# Patient Record
Sex: FEMALE | Race: ASIAN | NOT HISPANIC OR LATINO | URBAN - METROPOLITAN AREA
[De-identification: names, ages, dates, MRNs, and addresses within clinical notes are randomized per-mention and may not be internally consistent; named-entity substitution may affect disease eponyms.]

---

## 2019-07-09 NOTE — H&P ADULT - NSHPLABSRESULTS_GEN_ALL_CORE
Stress test (6/11/19): EF 67%, apex and inferior wall ischemia   Echo (4/26/19): EF 55-60%, mild MR and TR   EKG (4/26/19); SB at 56 bpm

## 2019-07-09 NOTE — H&P ADULT - HISTORY OF PRESENT ILLNESS
87 y.o female with PMHx of HTN, HLD, T2DM, CAD, s/p LAD/ D1 stent in 3/2015 who c/o CLARK and intermittent chest pain, radiating to back found to have apical and inferior wall ischemia. Pt was referred for cardiac cath with possible PCI.

## 2019-07-09 NOTE — H&P ADULT - ASSESSMENT
87 y.o female with PMHx of HTN, HLD, T2DM, CAD, s/p LAD/ D1 stent in 3/2015 who c/o CLARK and intermittent chest pain, radiating to back found to have apical and inferior wall ischemia. Pt was referred for cardiac cath with possible PCI.     # CAD  - plan for cardiac cath with possible PCI   - risks and benefits of procedure reviewed, all questions answered   - informed consent obtained, pt verbalized understanding. 87 y.o female with PMHx of HTN, HLD, T2DM, CAD, s/p LAD/ D1 stent in 3/2015 who c/o CLARK and intermittent chest pain, radiating to back found to have apical and inferior wall ischemia. Pt was referred for cardiac cath with possible PCI.   Calculated pt's bleeding risk score is 1.7%.

## 2019-07-09 NOTE — H&P ADULT - NSHPPHYSICALEXAM_GEN_ALL_CORE
Vital Signs : BP  130/60      HR  57     RR 16                Constitutional: well developed, well nourished, no deformities and no acute distress    Neurological: Alert & Oriented x 3, COE, no focal deficits    HEENT: NC/AT, PERRLA, EOMI,  Neck supple.    Respiratory: CTA B/L, No wheezing/crackles/rhonchi    Cardiovascular: (+) S1 & S2, RRR, No m/r/g    Gastrointestinal: soft, NT, nondistended, (+) BS    Genitourinary: non distended bladder, voiding freely    Extremities: No pedal edema, No clubbing, No cyanosis    Skin:  normal skin color and pigmentation, no skin lesions

## 2019-07-09 NOTE — H&P ADULT - NSHPREVIEWOFSYSTEMS_GEN_ALL_CORE
General: Pt denies recent weight loss/fever/chills    Neurological: denies numbness or  sensation loss    Cardiovascular: denies palpitations/leg edema (+) chest pain on exertion    Respiratory and Thorax: denies cough/wheezing (+)CLARK    Gastrointestinal: denies abdominal pain/diarrhea/constipation/bloody stool    Genitourinary: denies urinary frequency/urgency/ dysuria    Musculoskeletal: denies joint pain or swelling, denies restricted motion    Hematologic: denies abnormal bleeding

## 2019-07-09 NOTE — ASU PATIENT PROFILE, ADULT - PMH
CAD (coronary artery disease)  2 cardiac stents placed  Diabetes mellitus    Hyperlipidemia    Hypertension

## 2019-07-09 NOTE — H&P ADULT - PROBLEM SELECTOR PLAN 1
Pt is referred for Lt heart cath/possible PCI. Labs & medications are reviewed. Informed consent obtained after discussion of [facility] risks, benefits and alternatives  with patient. Risk discussed included, but not limited to MI, stroke, mortality, major bleeding, arrhythmia, or infection.  An educational material provided. Pt. verbalizes understandings of pre-procedural instructions.

## 2019-07-10 ENCOUNTER — OUTPATIENT (OUTPATIENT)
Dept: OUTPATIENT SERVICES | Facility: HOSPITAL | Age: 84
LOS: 1 days | Discharge: HOME | End: 2019-07-10
Payer: MEDICARE

## 2019-07-10 VITALS
HEART RATE: 53 BPM | OXYGEN SATURATION: 98 % | DIASTOLIC BLOOD PRESSURE: 73 MMHG | TEMPERATURE: 97 F | RESPIRATION RATE: 16 BRPM | WEIGHT: 152.12 LBS | HEIGHT: 60 IN | SYSTOLIC BLOOD PRESSURE: 135 MMHG

## 2019-07-10 VITALS
OXYGEN SATURATION: 98 % | HEART RATE: 60 BPM | RESPIRATION RATE: 16 BRPM | SYSTOLIC BLOOD PRESSURE: 140 MMHG | DIASTOLIC BLOOD PRESSURE: 60 MMHG

## 2019-07-10 DIAGNOSIS — H26.9 UNSPECIFIED CATARACT: Chronic | ICD-10-CM

## 2019-07-10 DIAGNOSIS — I25.10 ATHEROSCLEROTIC HEART DISEASE OF NATIVE CORONARY ARTERY WITHOUT ANGINA PECTORIS: ICD-10-CM

## 2019-07-10 LAB
ANION GAP SERPL CALC-SCNC: 6 MMOL/L — SIGNIFICANT CHANGE UP (ref 5–17)
BUN SERPL-MCNC: 16 MG/DL — SIGNIFICANT CHANGE UP (ref 7–23)
CALCIUM SERPL-MCNC: 9 MG/DL — SIGNIFICANT CHANGE UP (ref 8.5–10.1)
CHLORIDE SERPL-SCNC: 106 MMOL/L — SIGNIFICANT CHANGE UP (ref 96–108)
CO2 SERPL-SCNC: 29 MMOL/L — SIGNIFICANT CHANGE UP (ref 22–31)
CREAT SERPL-MCNC: 0.93 MG/DL — SIGNIFICANT CHANGE UP (ref 0.5–1.3)
GLUCOSE SERPL-MCNC: 108 MG/DL — HIGH (ref 70–99)
HCT VFR BLD CALC: 39.2 % — SIGNIFICANT CHANGE UP (ref 34.5–45)
HGB BLD-MCNC: 12.4 G/DL — SIGNIFICANT CHANGE UP (ref 11.5–15.5)
MCHC RBC-ENTMCNC: 30.2 PG — SIGNIFICANT CHANGE UP (ref 27–34)
MCHC RBC-ENTMCNC: 31.6 GM/DL — LOW (ref 32–36)
MCV RBC AUTO: 95.4 FL — SIGNIFICANT CHANGE UP (ref 80–100)
PLATELET # BLD AUTO: 236 K/UL — SIGNIFICANT CHANGE UP (ref 150–400)
POTASSIUM SERPL-MCNC: 4.2 MMOL/L — SIGNIFICANT CHANGE UP (ref 3.5–5.3)
POTASSIUM SERPL-SCNC: 4.2 MMOL/L — SIGNIFICANT CHANGE UP (ref 3.5–5.3)
RBC # BLD: 4.11 M/UL — SIGNIFICANT CHANGE UP (ref 3.8–5.2)
RBC # FLD: 12.6 % — SIGNIFICANT CHANGE UP (ref 10.3–14.5)
SODIUM SERPL-SCNC: 141 MMOL/L — SIGNIFICANT CHANGE UP (ref 135–145)
WBC # BLD: 5.09 K/UL — SIGNIFICANT CHANGE UP (ref 3.8–10.5)
WBC # FLD AUTO: 5.09 K/UL — SIGNIFICANT CHANGE UP (ref 3.8–10.5)

## 2019-07-10 PROCEDURE — 93010 ELECTROCARDIOGRAM REPORT: CPT

## 2019-07-10 NOTE — PACU DISCHARGE NOTE - COMMENTS
pt ghada for discharge home pts family verbalizes understand regarding discahrge instructions no questions at this time site intact iv removed pt escorted via transport w wheelchair and daughter.

## 2019-07-10 NOTE — PACU DISCHARGE NOTE - NS MD DISCHARGE NOTE DISCHARGE
Home Name and  verified. Patient stated PCP is Dr. Ivette Phelan [facility])  

## 2019-07-10 NOTE — PROGRESS NOTE ADULT - SUBJECTIVE AND OBJECTIVE BOX
Cath Lab Nurse Practitioner Note  HPI:  87 y.o female with PMHx of HTN, HLD, T2DM, CAD, s/p LAD/ D1 stent in 3/2015 who c/o CLARK and intermittent chest pain, radiating to back found to have apical and inferior wall ischemia. Pt was referred for cardiac cath with possible PCI.    s/p LHC : Non obstructive CAD  Pt denies chest pain/SOB/palpitations post cath.    Physical exam:  Vital Signs Last 24 Hrs  T(F): 97   HR: 53   BP: 135/73   RR: 16   SpO2: 98%   Cardiac : (+)S1S2, RRR  Lungs: CTA B/L  Abd: soft, NT, (+)BS  Ext: Rt groin (+) Mynx closure device , 2+ Rt DP    Labs:                        12.4   5.09  )-----------( 236      ( 10 Jul 2019 08:35 )             39.2     07-10    141  |  106  |  16  ----------------------------<  108<H>  4.2   |  29  |  0.93    Ca    9.0      10 Jul 2019 08:35    A/P : Non obstructive CAD  -encourage po hydration  -medical Mx for CAD  -hold metformin for 48 hrs post cath  -d/c home today  -f/u with Dr. Salgado in 1-2 weeks  -Plan discussed with pt/Dr. Man.

## 2019-07-17 DIAGNOSIS — Z79.84 LONG TERM (CURRENT) USE OF ORAL HYPOGLYCEMIC DRUGS: ICD-10-CM

## 2019-07-17 DIAGNOSIS — Z79.82 LONG TERM (CURRENT) USE OF ASPIRIN: ICD-10-CM

## 2019-07-17 DIAGNOSIS — R07.89 OTHER CHEST PAIN: ICD-10-CM

## 2019-07-17 DIAGNOSIS — Z79.02 LONG TERM (CURRENT) USE OF ANTITHROMBOTICS/ANTIPLATELETS: ICD-10-CM

## 2019-07-17 DIAGNOSIS — R94.39 ABNORMAL RESULT OF OTHER CARDIOVASCULAR FUNCTION STUDY: ICD-10-CM

## 2019-07-17 DIAGNOSIS — Z95.5 PRESENCE OF CORONARY ANGIOPLASTY IMPLANT AND GRAFT: ICD-10-CM

## 2019-07-17 DIAGNOSIS — I10 ESSENTIAL (PRIMARY) HYPERTENSION: ICD-10-CM

## 2019-07-17 DIAGNOSIS — E66.9 OBESITY, UNSPECIFIED: ICD-10-CM

## 2019-07-17 DIAGNOSIS — E11.9 TYPE 2 DIABETES MELLITUS WITHOUT COMPLICATIONS: ICD-10-CM

## 2019-07-17 DIAGNOSIS — I25.118 ATHEROSCLEROTIC HEART DISEASE OF NATIVE CORONARY ARTERY WITH OTHER FORMS OF ANGINA PECTORIS: ICD-10-CM

## 2019-07-17 DIAGNOSIS — I20.8 OTHER FORMS OF ANGINA PECTORIS: ICD-10-CM

## 2019-07-17 DIAGNOSIS — E78.00 PURE HYPERCHOLESTEROLEMIA, UNSPECIFIED: ICD-10-CM

## 2021-05-24 ENCOUNTER — INPATIENT (INPATIENT)
Facility: HOSPITAL | Age: 86
LOS: 7 days | Discharge: HOME | DRG: 872 | End: 2021-06-01
Attending: STUDENT IN AN ORGANIZED HEALTH CARE EDUCATION/TRAINING PROGRAM | Admitting: STUDENT IN AN ORGANIZED HEALTH CARE EDUCATION/TRAINING PROGRAM
Payer: MEDICARE

## 2021-05-24 VITALS
SYSTOLIC BLOOD PRESSURE: 66 MMHG | HEART RATE: 54 BPM | DIASTOLIC BLOOD PRESSURE: 47 MMHG | RESPIRATION RATE: 19 BRPM | OXYGEN SATURATION: 98 % | WEIGHT: 141.98 LBS | TEMPERATURE: 97 F | HEIGHT: 72 IN

## 2021-05-24 DIAGNOSIS — K83.09 OTHER CHOLANGITIS: ICD-10-CM

## 2021-05-24 DIAGNOSIS — I25.10 ATHEROSCLEROTIC HEART DISEASE OF NATIVE CORONARY ARTERY WITHOUT ANGINA PECTORIS: ICD-10-CM

## 2021-05-24 DIAGNOSIS — Z29.9 ENCOUNTER FOR PROPHYLACTIC MEASURES, UNSPECIFIED: ICD-10-CM

## 2021-05-24 DIAGNOSIS — K80.21 CALCULUS OF GALLBLADDER WITHOUT CHOLECYSTITIS WITH OBSTRUCTION: ICD-10-CM

## 2021-05-24 DIAGNOSIS — K21.9 GASTRO-ESOPHAGEAL REFLUX DISEASE WITHOUT ESOPHAGITIS: ICD-10-CM

## 2021-05-24 DIAGNOSIS — S42.402A UNSPECIFIED FRACTURE OF LOWER END OF LEFT HUMERUS, INITIAL ENCOUNTER FOR CLOSED FRACTURE: ICD-10-CM

## 2021-05-24 DIAGNOSIS — S22.39XA FRACTURE OF ONE RIB, UNSPECIFIED SIDE, INITIAL ENCOUNTER FOR CLOSED FRACTURE: ICD-10-CM

## 2021-05-24 DIAGNOSIS — A41.9 SEPSIS, UNSPECIFIED ORGANISM: ICD-10-CM

## 2021-05-24 DIAGNOSIS — I10 ESSENTIAL (PRIMARY) HYPERTENSION: ICD-10-CM

## 2021-05-24 DIAGNOSIS — E78.5 HYPERLIPIDEMIA, UNSPECIFIED: ICD-10-CM

## 2021-05-24 DIAGNOSIS — N17.9 ACUTE KIDNEY FAILURE, UNSPECIFIED: ICD-10-CM

## 2021-05-24 DIAGNOSIS — E11.9 TYPE 2 DIABETES MELLITUS WITHOUT COMPLICATIONS: ICD-10-CM

## 2021-05-24 DIAGNOSIS — H26.9 UNSPECIFIED CATARACT: Chronic | ICD-10-CM

## 2021-05-24 LAB
ALBUMIN SERPL ELPH-MCNC: 3.2 G/DL — LOW (ref 3.3–5)
ALP SERPL-CCNC: 180 U/L — HIGH (ref 40–120)
ALT FLD-CCNC: 97 U/L — HIGH (ref 12–78)
ANION GAP SERPL CALC-SCNC: 7 MMOL/L — SIGNIFICANT CHANGE UP (ref 5–17)
APTT BLD: 26.6 SEC — LOW (ref 27.5–35.5)
AST SERPL-CCNC: 107 U/L — HIGH (ref 15–37)
BASOPHILS # BLD AUTO: 0.02 K/UL — SIGNIFICANT CHANGE UP (ref 0–0.2)
BASOPHILS NFR BLD AUTO: 0.2 % — SIGNIFICANT CHANGE UP (ref 0–2)
BILIRUB SERPL-MCNC: 1.3 MG/DL — HIGH (ref 0.2–1.2)
BUN SERPL-MCNC: 36 MG/DL — HIGH (ref 7–23)
CALCIUM SERPL-MCNC: 8.5 MG/DL — SIGNIFICANT CHANGE UP (ref 8.5–10.1)
CHLORIDE SERPL-SCNC: 102 MMOL/L — SIGNIFICANT CHANGE UP (ref 96–108)
CO2 SERPL-SCNC: 27 MMOL/L — SIGNIFICANT CHANGE UP (ref 22–31)
CREAT SERPL-MCNC: 1.8 MG/DL — HIGH (ref 0.5–1.3)
EOSINOPHIL # BLD AUTO: 0.02 K/UL — SIGNIFICANT CHANGE UP (ref 0–0.5)
EOSINOPHIL NFR BLD AUTO: 0.2 % — SIGNIFICANT CHANGE UP (ref 0–6)
GLUCOSE SERPL-MCNC: 192 MG/DL — HIGH (ref 70–99)
HCT VFR BLD CALC: 35.3 % — SIGNIFICANT CHANGE UP (ref 34.5–45)
HGB BLD-MCNC: 11.6 G/DL — SIGNIFICANT CHANGE UP (ref 11.5–15.5)
IMM GRANULOCYTES NFR BLD AUTO: 1.7 % — HIGH (ref 0–1.5)
INR BLD: 1.17 RATIO — HIGH (ref 0.88–1.16)
LACTATE SERPL-SCNC: 1.1 MMOL/L — SIGNIFICANT CHANGE UP (ref 0.7–2)
LACTATE SERPL-SCNC: 2.5 MMOL/L — HIGH (ref 0.7–2)
LIDOCAIN IGE QN: 272 U/L — SIGNIFICANT CHANGE UP (ref 73–393)
LYMPHOCYTES # BLD AUTO: 0.68 K/UL — LOW (ref 1–3.3)
LYMPHOCYTES # BLD AUTO: 6.7 % — LOW (ref 13–44)
MCHC RBC-ENTMCNC: 30.9 PG — SIGNIFICANT CHANGE UP (ref 27–34)
MCHC RBC-ENTMCNC: 32.9 GM/DL — SIGNIFICANT CHANGE UP (ref 32–36)
MCV RBC AUTO: 94.1 FL — SIGNIFICANT CHANGE UP (ref 80–100)
MONOCYTES # BLD AUTO: 0.91 K/UL — HIGH (ref 0–0.9)
MONOCYTES NFR BLD AUTO: 9 % — SIGNIFICANT CHANGE UP (ref 2–14)
NEUTROPHILS # BLD AUTO: 8.3 K/UL — HIGH (ref 1.8–7.4)
NEUTROPHILS NFR BLD AUTO: 82.2 % — HIGH (ref 43–77)
NRBC # BLD: 0 /100 WBCS — SIGNIFICANT CHANGE UP (ref 0–0)
NT-PROBNP SERPL-SCNC: 1103 PG/ML — HIGH (ref 0–450)
PLATELET # BLD AUTO: 402 K/UL — HIGH (ref 150–400)
POTASSIUM SERPL-MCNC: 5 MMOL/L — SIGNIFICANT CHANGE UP (ref 3.5–5.3)
POTASSIUM SERPL-SCNC: 5 MMOL/L — SIGNIFICANT CHANGE UP (ref 3.5–5.3)
PROT SERPL-MCNC: 7 G/DL — SIGNIFICANT CHANGE UP (ref 6–8.3)
PROTHROM AB SERPL-ACNC: 13.6 SEC — SIGNIFICANT CHANGE UP (ref 10.6–13.6)
RBC # BLD: 3.75 M/UL — LOW (ref 3.8–5.2)
RBC # FLD: 12.4 % — SIGNIFICANT CHANGE UP (ref 10.3–14.5)
SARS-COV-2 RNA SPEC QL NAA+PROBE: SIGNIFICANT CHANGE UP
SODIUM SERPL-SCNC: 136 MMOL/L — SIGNIFICANT CHANGE UP (ref 135–145)
TROPONIN I SERPL-MCNC: 0.03 NG/ML — SIGNIFICANT CHANGE UP (ref 0.01–0.04)
WBC # BLD: 10.1 K/UL — SIGNIFICANT CHANGE UP (ref 3.8–10.5)
WBC # FLD AUTO: 10.1 K/UL — SIGNIFICANT CHANGE UP (ref 3.8–10.5)

## 2021-05-24 PROCEDURE — 93010 ELECTROCARDIOGRAM REPORT: CPT

## 2021-05-24 PROCEDURE — 71045 X-RAY EXAM CHEST 1 VIEW: CPT | Mod: 26

## 2021-05-24 PROCEDURE — 73090 X-RAY EXAM OF FOREARM: CPT | Mod: 26,LT

## 2021-05-24 PROCEDURE — 73200 CT UPPER EXTREMITY W/O DYE: CPT | Mod: 26,LT

## 2021-05-24 PROCEDURE — 99223 1ST HOSP IP/OBS HIGH 75: CPT | Mod: GC

## 2021-05-24 PROCEDURE — 73070 X-RAY EXAM OF ELBOW: CPT | Mod: 26,LT,59

## 2021-05-24 PROCEDURE — 73060 X-RAY EXAM OF HUMERUS: CPT | Mod: 26,LT

## 2021-05-24 PROCEDURE — 99285 EMERGENCY DEPT VISIT HI MDM: CPT | Mod: CS

## 2021-05-24 PROCEDURE — 73080 X-RAY EXAM OF ELBOW: CPT | Mod: 26,LT

## 2021-05-24 PROCEDURE — 74176 CT ABD & PELVIS W/O CONTRAST: CPT | Mod: 26,MA

## 2021-05-24 PROCEDURE — 71250 CT THORAX DX C-: CPT | Mod: 26,MA

## 2021-05-24 PROCEDURE — 76376 3D RENDER W/INTRP POSTPROCES: CPT | Mod: 26

## 2021-05-24 RX ORDER — SIMVASTATIN 20 MG/1
40 TABLET, FILM COATED ORAL AT BEDTIME
Refills: 0 | Status: DISCONTINUED | OUTPATIENT
Start: 2021-05-24 | End: 2021-05-24

## 2021-05-24 RX ORDER — SODIUM CHLORIDE 9 MG/ML
2000 INJECTION INTRAMUSCULAR; INTRAVENOUS; SUBCUTANEOUS ONCE
Refills: 0 | Status: COMPLETED | OUTPATIENT
Start: 2021-05-24 | End: 2021-05-24

## 2021-05-24 RX ORDER — DEXTROSE 50 % IN WATER 50 %
12.5 SYRINGE (ML) INTRAVENOUS ONCE
Refills: 0 | Status: DISCONTINUED | OUTPATIENT
Start: 2021-05-24 | End: 2021-05-30

## 2021-05-24 RX ORDER — SUCRALFATE 1 G
1 TABLET ORAL EVERY 12 HOURS
Refills: 0 | Status: DISCONTINUED | OUTPATIENT
Start: 2021-05-24 | End: 2021-06-01

## 2021-05-24 RX ORDER — PANTOPRAZOLE SODIUM 20 MG/1
40 TABLET, DELAYED RELEASE ORAL
Refills: 0 | Status: DISCONTINUED | OUTPATIENT
Start: 2021-05-24 | End: 2021-06-01

## 2021-05-24 RX ORDER — GLUCAGON INJECTION, SOLUTION 0.5 MG/.1ML
1 INJECTION, SOLUTION SUBCUTANEOUS ONCE
Refills: 0 | Status: DISCONTINUED | OUTPATIENT
Start: 2021-05-24 | End: 2021-05-30

## 2021-05-24 RX ORDER — POLYETHYLENE GLYCOL 3350 17 G/17G
17 POWDER, FOR SOLUTION ORAL DAILY
Refills: 0 | Status: DISCONTINUED | OUTPATIENT
Start: 2021-05-24 | End: 2021-06-01

## 2021-05-24 RX ORDER — DEXTROSE 50 % IN WATER 50 %
15 SYRINGE (ML) INTRAVENOUS ONCE
Refills: 0 | Status: DISCONTINUED | OUTPATIENT
Start: 2021-05-24 | End: 2021-05-30

## 2021-05-24 RX ORDER — LANOLIN ALCOHOL/MO/W.PET/CERES
5 CREAM (GRAM) TOPICAL AT BEDTIME
Refills: 0 | Status: DISCONTINUED | OUTPATIENT
Start: 2021-05-24 | End: 2021-06-01

## 2021-05-24 RX ORDER — DEXTROSE 50 % IN WATER 50 %
25 SYRINGE (ML) INTRAVENOUS ONCE
Refills: 0 | Status: DISCONTINUED | OUTPATIENT
Start: 2021-05-24 | End: 2021-05-30

## 2021-05-24 RX ORDER — ASPIRIN/CALCIUM CARB/MAGNESIUM 324 MG
81 TABLET ORAL DAILY
Refills: 0 | Status: DISCONTINUED | OUTPATIENT
Start: 2021-05-24 | End: 2021-06-01

## 2021-05-24 RX ORDER — OXYCODONE HYDROCHLORIDE 5 MG/1
10 TABLET ORAL EVERY 4 HOURS
Refills: 0 | Status: DISCONTINUED | OUTPATIENT
Start: 2021-05-24 | End: 2021-05-25

## 2021-05-24 RX ORDER — SODIUM CHLORIDE 9 MG/ML
1000 INJECTION, SOLUTION INTRAVENOUS
Refills: 0 | Status: DISCONTINUED | OUTPATIENT
Start: 2021-05-24 | End: 2021-05-30

## 2021-05-24 RX ORDER — SODIUM CHLORIDE 9 MG/ML
1000 INJECTION INTRAMUSCULAR; INTRAVENOUS; SUBCUTANEOUS
Refills: 0 | Status: DISCONTINUED | OUTPATIENT
Start: 2021-05-24 | End: 2021-05-25

## 2021-05-24 RX ORDER — OXYCODONE HYDROCHLORIDE 5 MG/1
5 TABLET ORAL EVERY 4 HOURS
Refills: 0 | Status: DISCONTINUED | OUTPATIENT
Start: 2021-05-24 | End: 2021-05-25

## 2021-05-24 RX ORDER — ACETAMINOPHEN 500 MG
650 TABLET ORAL EVERY 6 HOURS
Refills: 0 | Status: DISCONTINUED | OUTPATIENT
Start: 2021-05-24 | End: 2021-06-01

## 2021-05-24 RX ORDER — PIPERACILLIN AND TAZOBACTAM 4; .5 G/20ML; G/20ML
3.38 INJECTION, POWDER, LYOPHILIZED, FOR SOLUTION INTRAVENOUS ONCE
Refills: 0 | Status: COMPLETED | OUTPATIENT
Start: 2021-05-24 | End: 2021-05-24

## 2021-05-24 RX ORDER — SENNA PLUS 8.6 MG/1
2 TABLET ORAL AT BEDTIME
Refills: 0 | Status: DISCONTINUED | OUTPATIENT
Start: 2021-05-24 | End: 2021-06-01

## 2021-05-24 RX ORDER — INSULIN LISPRO 100/ML
VIAL (ML) SUBCUTANEOUS EVERY 6 HOURS
Refills: 0 | Status: DISCONTINUED | OUTPATIENT
Start: 2021-05-24 | End: 2021-05-26

## 2021-05-24 RX ORDER — ACETAMINOPHEN 500 MG
0 TABLET ORAL
Qty: 0 | Refills: 0 | DISCHARGE

## 2021-05-24 RX ORDER — HEPARIN SODIUM 5000 [USP'U]/ML
5000 INJECTION INTRAVENOUS; SUBCUTANEOUS EVERY 12 HOURS
Refills: 0 | Status: DISCONTINUED | OUTPATIENT
Start: 2021-05-24 | End: 2021-06-01

## 2021-05-24 RX ORDER — PIPERACILLIN AND TAZOBACTAM 4; .5 G/20ML; G/20ML
3.38 INJECTION, POWDER, LYOPHILIZED, FOR SOLUTION INTRAVENOUS EVERY 8 HOURS
Refills: 0 | Status: DISCONTINUED | OUTPATIENT
Start: 2021-05-25 | End: 2021-05-25

## 2021-05-24 RX ADMIN — SODIUM CHLORIDE 75 MILLILITER(S): 9 INJECTION INTRAMUSCULAR; INTRAVENOUS; SUBCUTANEOUS at 22:46

## 2021-05-24 RX ADMIN — PIPERACILLIN AND TAZOBACTAM 200 GRAM(S): 4; .5 INJECTION, POWDER, LYOPHILIZED, FOR SOLUTION INTRAVENOUS at 16:08

## 2021-05-24 RX ADMIN — Medication 1 ENEMA: at 22:46

## 2021-05-24 RX ADMIN — SENNA PLUS 2 TABLET(S): 8.6 TABLET ORAL at 22:46

## 2021-05-24 RX ADMIN — Medication 5 MILLIGRAM(S): at 22:46

## 2021-05-24 RX ADMIN — SODIUM CHLORIDE 2000 MILLILITER(S): 9 INJECTION INTRAMUSCULAR; INTRAVENOUS; SUBCUTANEOUS at 16:08

## 2021-05-24 NOTE — H&P ADULT - PROBLEM SELECTOR PLAN 2
s/p mechanical fall with X ray left elbow: Distal LEFT humerus T-shaped intra-articular, intercondylar fracture deformity.  - seen and splinted by orthopedics in the ED  - pain control: tyelenol for mild, oxy for moderate and severe  - fu CT Left Elbow, orthopedic recs appreciated   - PT consulted CT showed showed Cholelithiasis. Dilated common bile duct, with fever, vomiting and decreased appetite ; with transaminitis   - follow up MRI  - GI Hernandez consulted zuri maher CT showed showed Cholelithiasis. Dilated common bile duct, with fever, vomiting and decreased appetite ; with transaminitis   - start zosyn, IVF   - GI Hernandez consulted appreciate recs BP noted to be 66/47, Lactate 2.5 on admission, febrile 101 at home; given 2L fluids in ED  - likely secondary to cholangitis; CT showed Cholelithiasis. Dilated common bile duct.  - continue IV zosyn to cover intraabdominal luba  - maintenance IVF Ns @75 cc/hour  - NPO for now except medications  - follow up repeat lactate  - follow up blood/urine cultures  - tyelenol PRN for fever

## 2021-05-24 NOTE — H&P ADULT - PROBLEM SELECTOR PLAN 9
Hold Metformin   - continue low dose insulin sliding scale with accuchecks  - follow up AM hemoglobin A1C Hold Metformin   - continue low dose insulin sliding scale with accuchecks  - follow up AM hemoglobin A1C    12. Constipation  - stool burden noted on CT A/P   - patient c/o no BM 3 days   - will give fleet enema Hold Metformin   - continue low dose insulin sliding scale with accuchecks  - follow up AM hemoglobin A1C    12. Constipation likely 2/2 to immobilization and opiates   - stool burden noted on CT A/P   - patient c/o no BM 3 days   - will give fleet enema Hold Metformin   - continue low dose insulin sliding scale with accuchecks  - follow up AM hemoglobin A1C    12. Constipation likely 2/2 to immobilization and opiates   - stool burden noted on CT A/P   - patient c/o no BM 3 days   - will give fleet enema  - bowel regimen senna/miralax Hold Metformin   - continue low dose insulin sliding scale with accuchecks and hypoglycemia protocol  - follow up AM hemoglobin A1C

## 2021-05-24 NOTE — ED PROVIDER NOTE - CARE PLAN
Detail Level: Zone Principal Discharge DX:	Sepsis  Secondary Diagnosis:	Cholelithiasis with obstruction  Secondary Diagnosis:	Elbow fracture, left

## 2021-05-24 NOTE — H&P ADULT - HISTORY OF PRESENT ILLNESS
88 yo F PMHx T2DM, HTN, HLD, GERD, CAD (s/p LAD 2019 with stents placed on DAPT) brought in by Daughter for fever, weakness. Patient states that she fell a week ago and since then she has been having pain in her left arm and left chest. Patient also complaining of constipation, feeling as if she cannot have a bowel movement.     History taken by the daughter who lives with her and is her primary caretaker. Patient had a mechanical fall May 12th in the grocery store. As per the daughter, she was doing fine walking slowing around the store and acting like her normal self. Upon leaving the store, the patient seemed to trip and fall on her left side, did not strike her head. Daughter said she was in some pain at the time, and gave her Bengay cream for the left arm pain, no swelling noted at the time. The following day, she found that her mother had severe swelling from her shoulder to her hand, and bruising. Patient was also complaining of severe pain. Daughter brought her to an orthopedist, who she follows for chronic shoulder pain. X rays were done and daughter states that she was sent home. Patient was complaining of severe pain, and inability to  her arm and complaining of left sided rib pain, worse with inhalation. Daughter brought her to [facility] in [place] who prescribed her oxycodone and a five day course of prednisone for the pain. She completed the five day course and was taking oxycodone tablets once a day at bedtime everyday within adequate control of the pain. Yesterday, patient complaining of decreased appetite, appeared more lethargic than usual. Patient was tolerating small bites of crackers, toast. Last night, daughter took her blood pressure and it was 140/70. This morning, daughter checked her temperature which was 101. She gave her tyelenol X 1, which improved the fever. Patient had an appointment to the hand specialist referred to be orthopedist and upon arriving to the office, patient vomited and was not feeling well. Daughter then brought her to the ER instead.     Denies any changes in her mental status, shortness of breath, focal weakness, abdominal pain, diarrhea, blood in stool, new rashes, sick contacts, or positive COVID contacts or dysuria, or hematuria .  89 yo F PMHx T2DM, HTN, HLD, GERD, CAD (s/p LAD 2019 with stents placed on DAPT) brought in by Daughter for fever, weakness. Patient states that she fell a week ago and since then she has been having pain in her left arm and left chest. Patient also complaining of constipation, feeling as if she cannot have a bowel movement.     History taken by the daughter who lives with her and is her primary caretaker. Patient had a mechanical fall May 12th in the grocery store. As per the daughter, she was doing fine walking slowing around the store and acting like her normal self. Upon leaving the store, the patient seemed to trip and fall on her left side, did not strike her head. Daughter said she was in some pain at the time, and gave her Bengay cream for the left arm pain, no swelling noted at the time. The following day, she found that her mother had severe swelling from her shoulder to her hand, and bruising. Patient was also complaining of severe pain. Daughter brought her to an orthopedist, who she follows for chronic shoulder pain. X rays were done and daughter states that she was sent home. Patient was complaining of severe pain, and inability to  her arm and complaining of left sided rib pain, worse with inhalation. Daughter brought her to [facility] in [place] who prescribed her oxycodone and a five day course of prednisone for the pain. She completed the five day course and was taking oxycodone tablets once a day at bedtime everyday within adequate control of the pain. Yesterday, patient complaining of decreased appetite, appeared more lethargic than usual. Patient was tolerating small bites of crackers, toast. Last night, daughter took her blood pressure and it was 140/70. This morning, daughter checked her temperature which was 101. She gave her tyelenol X 1, which improved the fever. Patient had an appointment to the hand specialist referred to be orthopedist and upon arriving to the office, patient vomited and was not feeling well. Daughter then brought her to the ER instead.     Denies any changes in her mental status, shortness of breath, focal weakness, abdominal pain, diarrhea, blood in stool, new rashes, sick contacts, or positive COVID contacts or dysuria, or hematuria.     ED Course:    BP 66/47 HR 57 SpO2 98 RA T 97 F   given zosyn X 1, 2L NS bolus with improvement in BP   CT A/P and CT Chest: BONES: Chronic appearing left anterior rib fractures. Cholelithiasis. Dilated common bile duct.  X ray left elbow:   Distal LEFT humerus T-shaped intra-articular, intercondylar fracture deformity.  Labs: lactate 2.5, BUN/Cr 36/1.8, AST/ALT: 107/97, alkphos: 180, total bilirubin: 1.3     88 yo F PMHx T2DM, HTN, HLD, GERD, CAD (s/p LAD 2019 with stents placed on DAPT) brought in by Daughter for fever, weakness. Patient states that she fell a week ago and since then she has been having pain in her left arm and left chest. Patient also complaining of constipation, feeling as if she cannot have a bowel movement.     History taken by the daughter who lives with her and is her primary caretaker. Patient had a mechanical fall May 12th in the grocery store. As per the daughter, she was doing fine walking slowing around the store and acting like her normal self. Upon leaving the store, the patient seemed to trip and fall on her left side, did not strike her head. Daughter said she was in some pain at the time, and gave her Bengay cream for the left arm pain, no swelling noted at the time. The following day, she found that her mother had severe swelling from her shoulder to her hand, and bruising. Patient was also complaining of severe pain. Daughter brought her to an orthopedist, who she follows for chronic shoulder pain. X rays were done and daughter states that she was sent home. Patient was complaining of severe pain, and inability to  her arm and complaining of left sided rib pain, worse with inhalation. Daughter brought her to [facility] in [place] who prescribed her oxycodone and a five day course of prednisone for the pain. She completed the five day course and was taking oxycodone tablets once a day at bedtime everyday within adequate control of the pain. Yesterday, patient complaining of decreased appetite, appeared more lethargic than usual. Patient was tolerating small bites of crackers, toast. Last night, daughter took her blood pressure and it was 140/70. This morning, daughter checked her temperature which was 101. She gave her tyelenol X 1, which improved the fever. Patient had an appointment to the hand specialist referred to be orthopedist and upon arriving to the office, patient vomited and was not feeling well. Daughter then brought her to the ER instead.     Denies any changes in her mental status, shortness of breath, focal weakness, abdominal pain, diarrhea, blood in stool, new rashes, sick contacts, or positive COVID contacts or dysuria, or hematuria.     ED Course:    BP 66/47 HR 57 SpO2 98 RA T 97 F   given zosyn X 1, 2L NS bolus with improvement in BP   EKG: sinus bradycardia @ 53 bpm, no ST or T wave changes noted   CT A/P and CT Chest: BONES: Chronic appearing left anterior rib fractures. Cholelithiasis. Dilated common bile duct.  X ray left elbow:   Distal LEFT humerus T-shaped intra-articular, intercondylar fracture deformity.  Labs: lactate 2.5, BUN/Cr 36/1.8, AST/ALT: 107/97, alkphos: 180, total bilirubin: 1.3; troponin negative      88 yo F PMHx T2DM, HTN, HLD, GERD, CAD (s/p LAD 2019 with stents placed on DAPT) brought in by Daughter for fever, weakness. Patient states that she fell a week ago and since then she has been having pain in her left arm and left chest. Patient also complaining of constipation, feeling as if she cannot have a bowel movement.     History taken by the daughter who lives with her and is her primary caretaker. Patient had a mechanical fall May 12th in the grocery store.As per the daughter, patient was in the grocery store. Upon leaving the store, the patient seemed to trip and fall on her left side, did not strike her head. Daughter said she was in some pain at the time, and gave her Bengay cream for the left arm pain, no swelling noted at the time. The following day, she found that her mother had severe swelling from her shoulder to her hand, and bruising. Patient was also complaining of severe pain. Daughter brought her to an orthopedist, who she follows for chronic shoulder pain. X rays were done and daughter states that she was sent home. Patient was complaining of severe pain, and inability to  her arm and complaining of left sided rib pain, worse with inhalation. Daughter brought her to [facility] in [place] who prescribed her oxycodone and a five day course of prednisone for the pain. She completed the five day course and was taking oxycodone tablets once a day at bedtime everyday within adequate control of the pain. Yesterday, patient complaining of decreased appetite, appeared more lethargic than usual. Patient was tolerating small bites of crackers, toast. Last night, daughter took her blood pressure and it was 140/70. This morning, daughter checked her temperature which was 101. She gave her tyelenol X 1, which improved the fever. Patient had an appointment to the hand specialist referred to be orthopedist and upon arriving to the office, patient vomited and was not feeling well. Daughter then brought her to the ER instead.     Denies any changes in her mental status, shortness of breath, focal weakness, abdominal pain, diarrhea, blood in stool, new rashes, sick contacts, or positive COVID contacts or dysuria, or hematuria.     ED Course:    BP 66/47 HR 57 SpO2 98 RA T 97 F   given zosyn X 1, 2L NS bolus with improvement in BP   EKG: sinus bradycardia @ 53 bpm, no ST or T wave changes noted   CT A/P and CT Chest: BONES: Chronic appearing left anterior rib fractures. Cholelithiasis. Dilated common bile duct.  X ray left elbow:   Distal LEFT humerus T-shaped intra-articular, intercondylar fracture deformity.  Labs: lactate 2.5, BUN/Cr 36/1.8, AST/ALT: 107/97, alkphos: 180, total bilirubin: 1.3; troponin negative      88 yo F PMHx T2DM, HTN, HLD, GERD, CAD (s/p LAD 2019 with stents placed on DAPT) brought in by Daughter for fever, weakness. Patient states that she fell a week ago and since then she has been having pain in her left arm and left chest. Patient also complaining of constipation, feeling as if she cannot have a bowel movement.   *patient speaks benigno and luis miguel     History taken by the daughter who lives with her and is her primary caretaker. Patient had a mechanical fall May 12th in the grocery store.As per the daughter, patient was in the grocery store. Upon leaving the store, the patient seemed to trip and fall on her left side, did not strike her head. Daughter said she was in some pain at the time, and gave her Bengay cream for the left arm pain, no swelling noted at the time. The following day, she found that her mother had severe swelling from her shoulder to her hand, and bruising. Patient was also complaining of severe pain. Daughter brought her to an orthopedist, who she follows for chronic shoulder pain. X rays were done and daughter states that she was sent home. Patient was complaining of severe pain, and inability to  her arm and complaining of left sided rib pain, worse with inhalation. Daughter brought her to [facility] in [place] who prescribed her oxycodone and a five day course of prednisone for the pain. She completed the five day course and was taking oxycodone tablets once a day at bedtime everyday within adequate control of the pain. Yesterday, patient complaining of decreased appetite, appeared more lethargic than usual. Patient was tolerating small bites of crackers, toast. Last night, daughter took her blood pressure and it was 140/70. This morning, daughter checked her temperature which was 101. She gave her tyelenol X 1, which improved the fever. Patient had an appointment to the hand specialist referred to be orthopedist and upon arriving to the office, patient vomited and was not feeling well. Daughter then brought her to the ER instead.     Denies any changes in her mental status, shortness of breath, focal weakness, abdominal pain, diarrhea, blood in stool, new rashes, sick contacts, or positive COVID contacts or dysuria, or hematuria.     ED Course:    BP 66/47 HR 57 SpO2 98 RA T 97 F   given zosyn X 1, 2L NS bolus with improvement in BP   EKG: sinus bradycardia @ 53 bpm, no ST or T wave changes noted   CT A/P and CT Chest: BONES: Chronic appearing left anterior rib fractures. Cholelithiasis. Dilated common bile duct.  X ray left elbow:   Distal LEFT humerus T-shaped intra-articular, intercondylar fracture deformity.  Labs: lactate 2.5, BUN/Cr 36/1.8, AST/ALT: 107/97, alkphos: 180, total bilirubin: 1.3; troponin negative      88 yo F PMHx T2DM, HTN, HLD, GERD, CAD (s/p LAD 2019 with stents placed on DAPT) brought in by Daughter for fever, weakness. Patient states that she fell a week ago and since then she has been having pain in her left arm and left chest. Patient also complaining of constipation, feeling as if she cannot have a bowel movement.     History taken by the daughter who lives with her and is her primary caretaker. Patient had a mechanical fall May 12th in the grocery store.As per the daughter, patient was in the grocery store. Upon leaving the store, the patient seemed to trip and fall on her left side, did not strike her head. Daughter said she was in some pain at the time, and gave her Bengay cream for the left arm pain, no swelling noted at the time. The following day, she found that her mother had severe swelling from her shoulder to her hand, and bruising. Patient was also complaining of severe pain. Daughter brought her to an orthopedist, who she follows for chronic shoulder pain. X rays were done and daughter states that she was sent home. Patient was complaining of severe pain, and inability to  her arm and complaining of left sided rib pain, worse with inhalation. Daughter brought her to [facility] in [place] who prescribed her oxycodone and a five day course of prednisone for the pain. She completed the five day course and was taking oxycodone tablets once a day at bedtime everyday within adequate control of the pain. Yesterday, patient complaining of decreased appetite, appeared more lethargic than usual. Patient was tolerating small bites of crackers, toast. Last night, daughter took her blood pressure and it was 140/70. This morning, daughter checked her temperature which was 101. She gave her tyelenol X 1, which improved the fever. Patient had an appointment to the hand specialist referred to be orthopedist and upon arriving to the office, patient vomited and was not feeling well. Daughter then brought her to the ER instead.     Patient speaks Tita and Janelle. Denies any changes in her mental status, shortness of breath, focal weakness, abdominal pain, diarrhea, blood in stool, new rashes, sick contacts, or positive COVID contacts or dysuria, or hematuria.     ED Course:   BP 66/47 HR 57 SpO2 98 RA T 97 F   given zosyn X 1, 2L NS bolus with improvement in BP   EKG: sinus bradycardia @ 53 bpm, no ST or T wave changes noted   CT A/P and CT Chest: BONES: Chronic appearing left anterior rib fractures. Cholelithiasis. Dilated common bile duct.  X ray left elbow:  Distal LEFT humerus T-shaped intra-articular, intercondylar fracture deformity.  Labs: lactate 2.5, BUN/Cr 36/1.8, AST/ALT: 107/97, alkphos: 180, total bilirubin: 1.3; troponin negative

## 2021-05-24 NOTE — H&P ADULT - NSHPSOCIALHISTORY_GEN_ALL_CORE
Tobacco: denies  ETOH: denies  Rec Drug Use: denies  Lives at home with Daughter, Son in Law and Grandson  Ambulates with Cane  Requires assistance for ADLS

## 2021-05-24 NOTE — H&P ADULT - NSICDXFAMILYHX_GEN_ALL_CORE_FT
FAMILY HISTORY:  Child  Still living? No  FH: MI (myocardial infarction), Age at diagnosis: Age Unknown

## 2021-05-24 NOTE — H&P ADULT - PROBLEM SELECTOR PLAN 10
start heparin subQ 5000 BID for DVT prophylaxis  IMPROVE VTE Individual Risk Assessment          RISK                                                          Points  [  ] Previous VTE                                                3  [  ] Thrombophilia                                             2  [  ] Lower limb paralysis                                   2        (unable to hold up >15 seconds)    [  ] Current Cancer                                             2         (within 6 months)  [  ] Immobilization > 24 hrs                              1  [  ] ICU/CCU stay > 24 hours                             1  [ x ] Age > 60                                                         1    IMPROVE VTE Score:         [     1    ] BUN/Cr: 36/1.8 likely prerenal 2/2 to sepsis, volume depletion  - start IVF maintenance   - eGFR noted to be 25, likely component of chronic kidney disease stage 4; continue to monitor renal function, hold nephrotoxic agents, avoid NSAIDs   11. Need for prophylactic measures  - start heparin for DVT prophylaxis  IMPROVE VTE Individual Risk Assessment          RISK                                                          Points  [  ] Previous VTE                                                3  [  ] Thrombophilia                                             2  [  ] Lower limb paralysis                                   2        (unable to hold up >15 seconds)    [  ] Current Cancer                                             2         (within 6 months)  [  ] Immobilization > 24 hrs                              1  [  ] ICU/CCU stay > 24 hours                             1  [ x ] Age > 60                                                         1    IMPROVE VTE Score:         [     1    ] BUN/Cr: 36/1.8 likely prerenal 2/2 to sepsis, volume depletion  - start IVF maintenance   - eGFR noted to be 25, possible component of chronic kidney disease; continue to monitor renal function, hold   nephrotoxic agents, avoid NSAIDs    11. Constipation likely 2/2 to immobilization and opiates   - stool burden noted on CT A/P   - patient c/o no BM 3 days   - will give fleet enema  - bowel regimen senna/miralax    11. Need for prophylactic measures  - start heparin subQ for DVT prophylaxis

## 2021-05-24 NOTE — H&P ADULT - NSHPREVIEWOFSYSTEMS_GEN_ALL_CORE
Constitutional: ADMITs to fever, denies chills, sweating  HEENT: denies headache, dizziness, or lightheadedness  Respiratory: denies SOB, cough, or wheezing  Cardiovascular: ADMITs to CP (see HPI), denies palpitations  Gastrointestinal: ADMITs to nausea, vomiting, constipation, denies diarrhea, abdominal pain, or bloody stools  Genitourinary: denies painful urination, increased frequency, urgency, or bloody urine  Skin/Breast: denies rashes or itching  Musculoskeletal: denies muscle aches, joint swelling, or muscle weakness  Neurologic: denies loss of sensation, numbness, or tingling  ROS negative except as noted above Constitutional: ADMITs to fever, denies chills, sweating  HEENT: denies headache, dizziness, or lightheadedness  Respiratory: denies SOB, cough, or wheezing  Cardiovascular: ADMITs to CP (see HPI), denies palpitations  Gastrointestinal: ADMITs to nausea, vomiting, constipation, denies diarrhea, abdominal pain, or bloody stools  Genitourinary: denies painful urination, increased frequency, urgency, or bloody urine  Skin/Breast: denies rashes or itching  Musculoskeletal: denies muscle aches, joint swelling, or muscle weakness  Neurologic: denies loss of sensation, numbness, or tingling

## 2021-05-24 NOTE — H&P ADULT - ASSESSMENT
89 yo F PMHx T2DM, HTN, HLD, GERD, CAD (s/p LAD 2019 with stents placed on DAPT) admitted for sepsis 2/2 infectious etiology  88 yo F PMHx T2DM, HTN, HLD, GERD, CAD (s/p LAD 2019 with stents placed on DAPT) admitted for sepsis likely due to intra-abdominal pathology, possible cholangitis given CBD dilation and elevated Tbili and LFTs.

## 2021-05-24 NOTE — H&P ADULT - NSHPOUTPATIENTPROVIDERS_GEN_ALL_CORE
Marzena Millan ([facility]) Marzena Millan ([facility])  COVD Moderna X 2: 2/2021

## 2021-05-24 NOTE — H&P ADULT - PROBLEM SELECTOR PLAN 3
CT showed showed Cholelithiasis. Dilated common bile duct, with fever, vomiting and decreased appetite   - follow up MRI  - GI  consulted zuri maher s/p mechanical fall with X ray left elbow: Distal LEFT humerus T-shaped intra-articular, intercondylar fracture deformity.  - seen and splinted by orthopedics in the ED  - pain control: tyelenol for mild, oxy for moderate and severe  - fu CT Left Elbow, orthopedic recs appreciated   - PT consulted s/p mechanical fall with X ray left elbow: Distal LEFT humerus T-shaped intra-articular, intercondylar fracture deformity.  - seen and splinted by orthopedics in the ED  - pain control: tyelenol for mild, oxy 5 mg for moderate and 10 mg for severe  - fu CT Left Elbow, orthopedic recs appreciated   - PT consulted s/p mechanical fall with X ray left elbow: Distal LEFT humerus T-shaped intra-articular, intercondylar fracture deformity.  - seen and splinted by orthopedics in the ED  - pain control: tylenol for mild, oxy 5 mg for moderate and 10 mg for severe  - fu CT Left Elbow, orthopedic recs appreciated   - PT consulted

## 2021-05-24 NOTE — ED PROVIDER NOTE - PROGRESS NOTE DETAILS
BP improving, orthopedics made aware of wrist fracture. CT results and imaging noted. No acute rib fracture noted. In light of elevated CBD and transaminitis concern for obstructing process. GI paged. Attempt to reach surgery PA with no  will try again. Pt and family updated on results Vitals on admission: /56 MAP 81 HR 65, SPO2 98%, cap refill less then 2 seconds spoke with GI in agreement with current plan

## 2021-05-24 NOTE — CONSULT NOTE ADULT - SUBJECTIVE AND OBJECTIVE BOX
89y Female community ambulator R hand dominant presents c/o presents with left elbow pain s/p fall off last Wednesday, after her daughter states she lost her balance and fell. At that time, the daughter states that they went and saw an outpatient orthopedist who placed them in a removable splint and told them they had to follow up with a hand specialist. The daughter states that they had an appointment with an Orthopedist at [place] today, however on the way there began vomiting and developed a fever which prompted them to come to the hospital instead. She has been wearing the removable splint, and has not been using the arm. No other injuries since the initial fall. Pt denies numbness tingling paresthesias in affected limb. Pt denies headstrike or LOC and denies any other orthopedic injuries at this time.    PAST MEDICAL & SURGICAL HISTORY:  Diabetes mellitus    CAD (coronary artery disease)  2 cardiac stents placed    Hypertension    Hyperlipidemia    Bilateral cataracts      MEDICATIONS  (STANDING):  piperacillin/tazobactam IVPB.. 3.375 Gram(s) IV Intermittent every 8 hours    Allergies    No Known Allergies    Intolerances                              11.6   10.10 )-----------( 402      ( 24 May 2021 15:29 )             35.3     24 May 2021 15:29    136    |  102    |  36     ----------------------------<  192    5.0     |  27     |  1.80     Ca    8.5        24 May 2021 15:29    TPro  7.0    /  Alb  3.2    /  TBili  1.3    /  DBili  x      /  AST  107    /  ALT  97     /  AlkPhos  180    24 May 2021 15:29    PT/INR - ( 24 May 2021 15:29 )   PT: 13.6 sec;   INR: 1.17 ratio         PTT - ( 24 May 2021 15:29 )  PTT:26.6 sec  Vital Signs Last 24 Hrs  T(C): 36.9 (05-24-21 @ 17:13), Max: 36.9 (05-24-21 @ 17:13)  T(F): 98.4 (05-24-21 @ 17:13), Max: 98.4 (05-24-21 @ 17:13)  HR: 57 (05-24-21 @ 17:13) (54 - 57)  BP: 108/54 (05-24-21 @ 17:13) (66/47 - 108/54)  BP(mean): --  RR: 17 (05-24-21 @ 17:13) (17 - 19)  SpO2: 98% (05-24-21 @ 17:13) (98% - 98%)    Imaging: XR demonstrates L distal humerus fracture. Given the poor quality of the initial imaging given the patients brace, further imaging is pending to further identify the extend of the fracture intra versus extraarticular and the pattern of the fracture.  XR and CT Pending.    Gen: NAD, AAOx3    LUE: Skin intact, No gross deformity at elbow  Ecchymosis over medial elbow and down the forearm,+ Swelling at elbow  no bony TTP at Shoulder/wrist/Hand/Fingers,   TTP over the elbow  +AIN/PIN/M/R/U/Msc/Ax,   SILT C5-T1,   Radial Pulse, compartments soft, hand is pink and warm    Secondary Survey:   No TTP over bony prominences, SILT, palpable pulses, full/painless A/PROM, compartments soft. No TTP over spinous processes or paraspinal muscles at C/T/L spine. No palpable step off. No other injuries or complaints.      A/P: 89y Female with L Distal humerus fracture     -pain control  -in posterior long arm splint with wings and placed in a sling for comfort  -NWB LUE in sling at all times  -keep splint clean dry intact  -rest, ice, elevate affected elbow  -NVI post splint  -FU XR and CT of the elbow post reduction  -Given the patients previous history and scheduled appointment with an outside orthopedic surgeon regarding this issue, and the non emergent nature of the fracture pattern, we recommend the patient FU with the scheduled orthopedist as an outpatient upon discharge from the hospital  -No acute orthopedic surgical intervention at this time  -Ortho to SO following the completion of imaging  -Orthopaedically stable for discharge  -Follow up w/ outside orthopedist after discharge.  -All patient's questions answered. Patient understands and agrees w/ above plan.  -Discussed case w/ attending who is aware and agrees with the plan

## 2021-05-24 NOTE — H&P ADULT - PROBLEM SELECTOR PLAN 1
BP noted to be 66/47, Lactate 2.5 on admission, febrile 101 at home  - likely secondary to cholecystitis   - start zosyn to cover gram negative organisms  - maintenance IVF BP noted to be 66/47, Lactate 2.5 on admission, febrile 101 at home; given 2 L fluids in ED  - likely secondary to cholangitis; CT showed Cholelithiasis. Dilated common bile duct.  - start zosyn to cover gram negative organisms  - maintenance IVF Ns @ 100 cc/hour  - NPO for now except medications  - follow up repeat lactate  - follow up repeat blood/urine cultures   - tyelenol PRN for fever BP noted to be 66/47, Lactate 2.5 on admission, febrile 101 at home; given 2 L fluids in ED  - likely secondary to cholangitis; CT showed Cholelithiasis. Dilated common bile duct.  - start zosyn to cover gram negative organisms  - maintenance IVF Ns @ 100 cc/hour  - NPO for now except medications  - follow up repeat lactate  - follow up blood/urine cultures   - tyelenol PRN for fever CT showed showed Cholelithiasis. Dilated common bile duct, with fever, vomiting and decreased appetite; with elevated Tbili and transaminitis   - NPO, IV fluids  - given high fever at home, there is a concern for cholangitis - c/w zosyn  - follow up blood cultures  - will likely need MRCP +/- ERCP, f/u AM LFTs/bili  - SAADIA Hernandez consulted; f/u recs

## 2021-05-24 NOTE — H&P ADULT - PROBLEM SELECTOR PLAN 7
Continue famotidine, sucralfate 1 gm, omeprazole   - daughter unsure if patient has diagnosed ulcer in past

## 2021-05-24 NOTE — H&P ADULT - PROBLEM SELECTOR PLAN 4
On isosorbide dinitrate, losartan,   - HOLD antihypertensives 2/2 to sepsis  - continue to follow blood pressure trend  - continue IVF maintenance On isosorbide dinitrate, losartan  - HOLD antihypertensives 2/2 to sepsis  - continue to follow blood pressure trend  - continue IVF maintenance On isosorbide dinitrate, losartan, atenolol at home   - HOLD antihypertensives 2/2 to sepsis  - continue to follow blood pressure trend  - continue IVF maintenance On isosorbide dinitrate, losartan, atenolol at home   - HOLD antihypertensives 2/2 to sepsis with profound hypotension  - continue to follow blood pressure trend, reinitiate BP meds as needed  - continue IVF maintenance

## 2021-05-24 NOTE — ED PROVIDER NOTE - PHYSICAL EXAMINATION
no midline C/T/L TTP  Ecchymosis noted to the upper left lateral chest wall under axilla and lower left lateral chest wall/upper left abdominal pain

## 2021-05-24 NOTE — H&P ADULT - NSICDXPASTMEDICALHX_GEN_ALL_CORE_FT
PAST MEDICAL HISTORY:  CAD (coronary artery disease) 2 cardiac stents placed    Diabetes mellitus     Hyperlipidemia     Hypertension

## 2021-05-24 NOTE — H&P ADULT - PROBLEM SELECTOR PLAN 8
history of Left heart cath 2019,  s/p LAD/ D1 stent in 3/2015, on ASA 81/Plavix at home   - hold plavix for now   - continue asa 81 mg daily  - [facility] consulted for clearance for possible ERCP; on DAPT history of Left heart cath 2019,  s/p LAD/ D1 stent in 3/2015, on ASA 81/Plavix at home   - hold plavix for now   - continue asa 81 mg daily  - patient has no substernal chest pain and low suspicion for ACS at this time   - troponin negative, no need to trend cardiac enzymes   - cardio [facility] consulted for clearance for possible ERCP; on DAPT history of Left heart cath 2019, s/p LAD/ D1 stent in 3/2015, on ASA 81/Plavix at home   - hold plavix for now  - continue asa 81 mg daily  - patient has no substernal chest pain and low suspicion for ACS at this time   - troponin negative, no need to trend cardiac enzymes   - cardio [facility] consulted for clearance for possible ERCP; on DAPT

## 2021-05-24 NOTE — H&P ADULT - ATTENDING COMMENTS
89 yo F PMHx T2DM, HTN, HLD, GERD, CAD (s/p LAD 2019 with stents placed on DAPT) admitted for sepsis likely due to intra-abdominal pathology, possible cholangitis given CBD dilation and elevated Tbili and LFTs. Also with left elbow fracture.    admit to medicine  NPO, IVF, IV antibiotics - hold antihypertensives for now  GI consult for MRCP +/- ERCP  follow up LFTs, Tbili - daily hepatic function panel  follow up blood cultures and monitor for fevers  ortho following for elbow fracture, recs appreciated    Agree with H&P as outlined above, edited where appropriate.

## 2021-05-24 NOTE — H&P ADULT - NSHPPHYSICALEXAM_GEN_ALL_CORE
LOS:     VITALS:   T(C): 36.9 (05-24-21 @ 17:13), Max: 36.9 (05-24-21 @ 17:13)  HR: 57 (05-24-21 @ 17:13) (54 - 57)  BP: 108/54 (05-24-21 @ 17:13) (66/47 - 108/54)  RR: 17 (05-24-21 @ 17:13) (17 - 19)  SpO2: 98% (05-24-21 @ 17:13) (98% - 98%)    GENERAL: NAD, lying in bed comfortably  HEAD:  Atraumatic, Normocephalic  EYES: EOMI, PERRLA, pale conjunctiva, + bilateral cataracts  ENT: Moist mucous membranes  NECK: Supple, No JVD, no cervical LAD, no thyromegaly   CHEST/LUNG: Clear to auscultation bilaterally; No rales, rhonchi, wheezing, or rubs. + pain with inspiration right sided chest wall, no bruising noted on chest wall   HEART: Regular rate and rhythm; No murmurs, rubs, or gallops  ABDOMEN: BSx4; Soft, nontender, + moderately distended  EXTREMITIES:  2+ Peripheral Pulses, brisk capillary refill. No clubbing, cyanosis, or edema  NERVOUS SYSTEM:  A&Ox3, no focal deficits, 5/5 strength RUE, LUE splinted, unable to move due to pain, + right sided tenderness to palpation ribs 3-5   SKIN: No rashes or lesions VITALS:   T(C): 36.9 (05-24-21 @ 17:13), Max: 36.9 (05-24-21 @ 17:13)  HR: 57 (05-24-21 @ 17:13) (54 - 57)  BP: 108/54 (05-24-21 @ 17:13) (66/47 - 108/54)  RR: 17 (05-24-21 @ 17:13) (17 - 19)  SpO2: 98% (05-24-21 @ 17:13) (98% - 98%)    GENERAL: NAD, lying in bed comfortably  HEAD:  Atraumatic, Normocephalic  EYES: EOMI, PERRLA, pale conjunctiva, + bilateral cataracts  ENT: Moist mucous membranes  NECK: Supple, No JVD, no cervical LAD, no thyromegaly   CHEST/LUNG: Clear to auscultation bilaterally; No rales, rhonchi, wheezing, or rubs. + pain with inspiration right sided chest wall, no bruising noted on chest wall   HEART: Regular rate and rhythm; No murmurs, rubs, or gallops  ABDOMEN: BSx4; Soft, nontender, + moderately distended  EXTREMITIES:  2+ Peripheral Pulses, brisk capillary refill. No clubbing, cyanosis, or edema  NERVOUS SYSTEM:  A&Ox3, no focal deficits, 5/5 strength RUE, LUE splinted, unable to move due to pain, + right sided tenderness to palpation ribs 3-5   SKIN: No rashes or lesions T(C): 36.9 (05-24-21 @ 17:13), Max: 36.9 (05-24-21 @ 17:13)  HR: 57 (05-24-21 @ 17:13) (54 - 57)  BP: 108/54 (05-24-21 @ 17:13) (66/47 - 108/54)  RR: 17 (05-24-21 @ 17:13) (17 - 19)  SpO2: 98% (05-24-21 @ 17:13) (98% - 98%)    GENERAL: NAD, lying in bed comfortably  HEAD:  Atraumatic, Normocephalic  EYES: EOMI, PERRLA, pale conjunctiva, + bilateral cataracts  ENT: Moist mucous membranes  NECK: Supple, No JVD, no cervical LAD, no thyromegaly   CHEST/LUNG: Clear to auscultation bilaterally; No rales, rhonchi, wheezing, or rubs. + pain with inspiration right sided chest wall, no bruising noted on chest wall   HEART: Regular rate and rhythm; No murmurs, rubs, or gallops  ABDOMEN: BSx4; Soft, nontender, + moderately distended  EXTREMITIES:  2+ Peripheral Pulses, brisk capillary refill. No clubbing, cyanosis, or edema  NERVOUS SYSTEM:  A&Ox3, no focal deficits, 5/5 strength RUE, LUE splinted, unable to move due to pain, + right sided tenderness to palpation ribs 3-5   SKIN: No rashes or lesions

## 2021-05-24 NOTE — ED PROVIDER NOTE - OBJECTIVE STATEMENT
Pt is a 89 yo female who presents to the ED with a cc of weakness. PMHx of CAD (coronary artery disease)  2 cardiac stents placed, Diabetes mellitus, HLD, HTN. Pt and family reports that approx 10 days ago pt suffered a mechanical fall. She reported left sided rib pain and left elbow pain following the fall. Denies striking head, denies LOC. Did not initially seek treatment the day of the fall but the following day she was seen at an outside orthopedic office and was told that she suffered a fracture to her left elbow and left rib fractures. She was placed in an arm brace and was given follow up with a hand specialist. They then followed up with their PMD last Monday and was prescribed Oxycodone for the pain. She has only been taking this at night. Pt had been doing well and then last night she c/o weakness, fatigue, nausea, and loss of appetite. This morning pt was found to have a fever T max of 102 for which she was given Tylenol. They attempted to make it to the specialist but pt began to vomit and had increased weakness so they came to the ED. Pt further notes chills, constipation, continued chest pain present since fall, worsening SOB, and an intermittent non productive cough. Denies abdominal pain.

## 2021-05-24 NOTE — ED PROVIDER NOTE - CLINICAL SUMMARY MEDICAL DECISION MAKING FREE TEXT BOX
Pt is a 88 yo female who presents to the ED with a cc of weakness. PMHx of CAD (coronary artery disease)  2 cardiac stents placed, Diabetes mellitus, HLD, HTN. Pt and family reports that approx 10 days ago pt suffered a mechanical fall. She reported left sided rib pain and left elbow pain following the fall. Denies striking head, denies LOC. Did not initially seek treatment the day of the fall but the following day she was seen at an outside orthopedic office and was told that she suffered a fracture to her left elbow and left rib fractures. She was placed in an arm brace and was given follow up with a hand specialist. They then followed up with their PMD last Monday and was prescribed Oxycodone for the pain. She has only been taking this at night. Pt had been doing well and then last night she c/o weakness, fatigue, nausea, and loss of appetite. This morning pt was found to have a fever T max of 102 for which she was given Tylenol. They attempted to make it to the specialist but pt began to vomit and had increased weakness so they came to the ED. Pt further notes chills, constipation, continued chest pain present since fall, worsening SOB, and an intermittent non productive cough. Denies abdominal pain. Pt is a 88 yo female who presents to the ED with a cc of weakness. PMHx of CAD (coronary artery disease)  2 cardiac stents placed, Diabetes mellitus, HLD, HTN. Pt and family reports that approx 10 days ago pt suffered a mechanical fall. She reported left sided rib pain and left elbow pain following the fall. Denies striking head, denies LOC. Did not initially seek treatment the day of the fall but the following day she was seen at an outside orthopedic office and was told that she suffered a fracture to her left elbow and left rib fractures. She was placed in an arm brace and was given follow up with a hand specialist. They then followed up with their PMD last Monday and was prescribed Oxycodone for the pain. She has only been taking this at night. Pt had been doing well and then last night she c/o weakness, fatigue, nausea, and loss of appetite. This morning pt was found to have a fever T max of 102 for which she was given Tylenol. They attempted to make it to the specialist but pt began to vomit and had increased weakness so they came to the ED. Pt further notes chills, constipation, continued chest pain present since fall, worsening SOB, and an intermittent non productive cough. Denies abdominal pain. Pt presenting with multiple medial complaints. In light of fever and hypotension high suspicion for sepsis source unclear at this time but must consider pneumonia in light of reported rib fractures. Will obtain screening septic work up, EKG, chest x-ray. Will torres scan begin hydration, start abx. x-ray left elbow and consult orthopedics. Will monitor

## 2021-05-24 NOTE — PATIENT PROFILE ADULT - VISION (WITH CORRECTIVE LENSES IF THE PATIENT USUALLY WEARS THEM):
Glasses- at home/Partially impaired: cannot see medication labels or newsprint, but can see obstacles in path, and the surrounding layout; can count fingers at arm's length

## 2021-05-25 LAB
-  ESBL: SIGNIFICANT CHANGE UP
APPEARANCE UR: CLEAR — SIGNIFICANT CHANGE UP
BILIRUB UR-MCNC: NEGATIVE — SIGNIFICANT CHANGE UP
COLOR SPEC: YELLOW — SIGNIFICANT CHANGE UP
DIFF PNL FLD: NEGATIVE — SIGNIFICANT CHANGE UP
E COLI DNA BLD POS QL NAA+NON-PROBE: SIGNIFICANT CHANGE UP
GLUCOSE UR QL: NEGATIVE — SIGNIFICANT CHANGE UP
GRAM STN FLD: SIGNIFICANT CHANGE UP
KETONES UR-MCNC: NEGATIVE — SIGNIFICANT CHANGE UP
LEUKOCYTE ESTERASE UR-ACNC: NEGATIVE — SIGNIFICANT CHANGE UP
METHOD TYPE: SIGNIFICANT CHANGE UP
NITRITE UR-MCNC: NEGATIVE — SIGNIFICANT CHANGE UP
PH UR: 6 — SIGNIFICANT CHANGE UP (ref 5–8)
PROT UR-MCNC: NEGATIVE — SIGNIFICANT CHANGE UP
SP GR SPEC: 1.01 — SIGNIFICANT CHANGE UP (ref 1.01–1.02)
UROBILINOGEN FLD QL: NEGATIVE — SIGNIFICANT CHANGE UP

## 2021-05-25 PROCEDURE — 99223 1ST HOSP IP/OBS HIGH 75: CPT

## 2021-05-25 PROCEDURE — 99233 SBSQ HOSP IP/OBS HIGH 50: CPT

## 2021-05-25 PROCEDURE — 74181 MRI ABDOMEN W/O CONTRAST: CPT | Mod: 26

## 2021-05-25 RX ORDER — MORPHINE SULFATE 50 MG/1
2 CAPSULE, EXTENDED RELEASE ORAL EVERY 4 HOURS
Refills: 0 | Status: DISCONTINUED | OUTPATIENT
Start: 2021-05-25 | End: 2021-05-25

## 2021-05-25 RX ORDER — SODIUM CHLORIDE 9 MG/ML
1000 INJECTION, SOLUTION INTRAVENOUS
Refills: 0 | Status: DISCONTINUED | OUTPATIENT
Start: 2021-05-25 | End: 2021-05-25

## 2021-05-25 RX ORDER — MORPHINE SULFATE 50 MG/1
2 CAPSULE, EXTENDED RELEASE ORAL EVERY 6 HOURS
Refills: 0 | Status: DISCONTINUED | OUTPATIENT
Start: 2021-05-25 | End: 2021-05-31

## 2021-05-25 RX ORDER — HYDRALAZINE HCL 50 MG
10 TABLET ORAL EVERY 6 HOURS
Refills: 0 | Status: DISCONTINUED | OUTPATIENT
Start: 2021-05-25 | End: 2021-05-29

## 2021-05-25 RX ORDER — MEROPENEM 1 G/30ML
500 INJECTION INTRAVENOUS EVERY 12 HOURS
Refills: 0 | Status: DISCONTINUED | OUTPATIENT
Start: 2021-05-25 | End: 2021-05-26

## 2021-05-25 RX ORDER — ONDANSETRON 8 MG/1
4 TABLET, FILM COATED ORAL EVERY 8 HOURS
Refills: 0 | Status: DISCONTINUED | OUTPATIENT
Start: 2021-05-25 | End: 2021-06-01

## 2021-05-25 RX ADMIN — Medication 81 MILLIGRAM(S): at 11:05

## 2021-05-25 RX ADMIN — MEROPENEM 100 MILLIGRAM(S): 1 INJECTION INTRAVENOUS at 21:15

## 2021-05-25 RX ADMIN — PIPERACILLIN AND TAZOBACTAM 25 GRAM(S): 4; .5 INJECTION, POWDER, LYOPHILIZED, FOR SOLUTION INTRAVENOUS at 00:13

## 2021-05-25 RX ADMIN — HEPARIN SODIUM 5000 UNIT(S): 5000 INJECTION INTRAVENOUS; SUBCUTANEOUS at 17:58

## 2021-05-25 RX ADMIN — SODIUM CHLORIDE 75 MILLILITER(S): 9 INJECTION, SOLUTION INTRAVENOUS at 10:36

## 2021-05-25 RX ADMIN — Medication 1 GRAM(S): at 17:58

## 2021-05-25 RX ADMIN — MEROPENEM 100 MILLIGRAM(S): 1 INJECTION INTRAVENOUS at 11:59

## 2021-05-25 RX ADMIN — HEPARIN SODIUM 5000 UNIT(S): 5000 INJECTION INTRAVENOUS; SUBCUTANEOUS at 06:09

## 2021-05-25 RX ADMIN — MORPHINE SULFATE 2 MILLIGRAM(S): 50 CAPSULE, EXTENDED RELEASE ORAL at 11:05

## 2021-05-25 RX ADMIN — MORPHINE SULFATE 2 MILLIGRAM(S): 50 CAPSULE, EXTENDED RELEASE ORAL at 11:20

## 2021-05-25 RX ADMIN — Medication 1 GRAM(S): at 06:08

## 2021-05-25 RX ADMIN — Medication 5 MILLIGRAM(S): at 21:20

## 2021-05-25 RX ADMIN — PIPERACILLIN AND TAZOBACTAM 25 GRAM(S): 4; .5 INJECTION, POWDER, LYOPHILIZED, FOR SOLUTION INTRAVENOUS at 07:46

## 2021-05-25 RX ADMIN — SENNA PLUS 2 TABLET(S): 8.6 TABLET ORAL at 21:20

## 2021-05-25 NOTE — CHART NOTE - NSCHARTNOTEFT_GEN_A_CORE
D/w daughter at bedside. MRCP negative for CBD stone but + G stones. HIDA scan ordered and Surgery consulted, Spoke to MIRLANDE Garces.

## 2021-05-25 NOTE — CONSULT NOTE ADULT - SUBJECTIVE AND OBJECTIVE BOX
[facility]  [place] [address]  [phone]      Chief Complaint:  Patient is a 89y old  Female who presents with a chief complaint of sepsis (25 May 2021 09:45)      HPI:88 yo F PMHx T2DM, HTN, HLD, GERD, CAD (s/p LAD 2019 with stents placed on DAPT) brought in by Daughter for fever, weakness. Patient states that she fell a week ago and since then she has been having pain in her left arm and left chest. Patient also complaining of constipation, feeling as if she cannot have a bowel movement.     History taken by the daughter who lives with her and is her primary caretaker. Patient had a mechanical fall May 12th in the grocery store.As per the daughter, patient was in the grocery store. Upon leaving the store, the patient seemed to trip and fall on her left side, did not strike her head. Daughter said she was in some pain at the time, and gave her Bengay cream for the left arm pain, no swelling noted at the time. The following day, she found that her mother had severe swelling from her shoulder to her hand, and bruising. Patient was also complaining of severe pain. Daughter brought her to an orthopedist, who she follows for chronic shoulder pain. X rays were done and daughter states that she was sent home. Patient was complaining of severe pain, and inability to  her arm and complaining of left sided rib pain, worse with inhalation. Daughter brought her to [facility] in [place] who prescribed her oxycodone and a five day course of prednisone for the pain. She completed the five day course and was taking oxycodone tablets once a day at bedtime everyday within adequate control of the pain. Yesterday, patient complaining of decreased appetite, appeared more lethargic than usual. Patient was tolerating small bites of crackers, toast. Last night, daughter took her blood pressure and it was 140/70. This morning, daughter checked her temperature which was 101. She gave her tyelenol X 1, which improved the fever. Patient had an appointment to the hand specialist referred to be orthopedist and upon arriving to the office, patient vomited and was not feeling well. Daughter then brought her to the ER instead.     Allergies:  No Known Allergies      Medications:  acetaminophen   Tablet .. 650 milliGRAM(s) Oral every 6 hours PRN  aspirin enteric coated 81 milliGRAM(s) Oral daily  dextrose 40% Gel 15 Gram(s) Oral once  dextrose 5% + sodium chloride 0.9%. 1000 milliLiter(s) IV Continuous <Continuous>  dextrose 5%. 1000 milliLiter(s) IV Continuous <Continuous>  dextrose 5%. 1000 milliLiter(s) IV Continuous <Continuous>  dextrose 50% Injectable 25 Gram(s) IV Push once  dextrose 50% Injectable 12.5 Gram(s) IV Push once  dextrose 50% Injectable 25 Gram(s) IV Push once  glucagon  Injectable 1 milliGRAM(s) IntraMuscular once  heparin   Injectable 5000 Unit(s) SubCutaneous every 12 hours  hydrALAZINE Injectable 10 milliGRAM(s) IV Push every 6 hours PRN  insulin lispro (ADMELOG) corrective regimen sliding scale   SubCutaneous every 6 hours  melatonin 5 milliGRAM(s) Oral at bedtime  morphine  - Injectable 2 milliGRAM(s) IV Push every 4 hours PRN  ondansetron Injectable 4 milliGRAM(s) IV Push every 8 hours PRN  pantoprazole    Tablet 40 milliGRAM(s) Oral before breakfast  piperacillin/tazobactam IVPB.. 3.375 Gram(s) IV Intermittent every 8 hours  polyethylene glycol 3350 17 Gram(s) Oral daily PRN  senna 2 Tablet(s) Oral at bedtime  sucralfate 1 Gram(s) Oral every 12 hours      PMHX/PSHX:  Diabetes mellitus    CAD (coronary artery disease)    Hypertension    Hyperlipidemia    No significant past surgical history    Bilateral cataracts        Family history:  FH: MI (myocardial infarction) (Child)        Social History:     ROS:     General:  No wt loss, + fevers, chills, night sweats, fatigue,   Eyes:  Good vision, no reported pain  ENT:  No sore throat, pain, runny nose, dysphagia  CV:  No pain, palpitations, hypo/hypertension  Resp:  No dyspnea, cough, tachypnea, wheezing  GI:  No pain, No nausea, No vomiting, No diarrhea, No constipation, No weight loss, No fever, No pruritis, No rectal bleeding, No tarry stools, No dysphagia,  :  No pain, bleeding, incontinence, nocturia  Muscle:  No pain, weakness  Neuro:  No weakness, tingling, memory problems  Psych:  No fatigue, insomnia, mood problems, depression  Endocrine:  No polyuria, polydipsia, cold/heat intolerance  Heme:  No petechiae, ecchymosis, easy bruisability  Skin:  No rash, tattoos, scars, edema      PHYSICAL EXAM:   Vital Signs:  Vital Signs Last 24 Hrs  T(C): 37.1 (25 May 2021 04:20), Max: 37.1 (25 May 2021 04:20)  T(F): 98.7 (25 May 2021 04:20), Max: 98.7 (25 May 2021 04:20)  HR: 65 (25 May 2021 04:20) (54 - 65)  BP: 166/74 (25 May 2021 04:20) (66/47 - 166/74)  BP(mean): --  RR: 18 (25 May 2021 04:20) (17 - 19)  SpO2: 94% (25 May 2021 04:20) (94% - 98%)  Daily Height in cm: 304.8 (24 May 2021 14:51)    Daily Weight in k.4 (25 May 2021 04:20)    GENERAL:  Appears stated age,   HEENT:  NC/AT,    CHEST:  Full & symmetric excursion,   HEART:  Regular rhythm  ABDOMEN:  Soft, non-tender, non-distended,   EXTEREMITIES:  no cyanosis,clubbing or edema  SKIN:  No rash  NEURO:  Alert,    LABS:                        11.6   10.10 )-----------( 402      ( 24 May 2021 15:29 )             35.3         136  |  102  |  36<H>  ----------------------------<  192<H>  5.0   |  27  |  1.80<H>    Ca    8.5      24 May 2021 15:29    TPro  7.0  /  Alb  3.2<L>  /  TBili  1.3<H>  /  DBili  x   /  AST  107<H>  /  ALT  97<H>  /  AlkPhos  180<H>      LIVER FUNCTIONS - ( 24 May 2021 15:29 )  Alb: 3.2 g/dL / Pro: 7.0 g/dL / ALK PHOS: 180 U/L / ALT: 97 U/L / AST: 107 U/L / GGT: x           PT/INR - ( 24 May 2021 15:29 )   PT: 13.6 sec;   INR: 1.17 ratio         PTT - ( 24 May 2021 15:29 )  PTT:26.6 sec    Amylase Serum--      Lipase [id number]       Ammonia--      Imaging:

## 2021-05-25 NOTE — CONSULT NOTE ADULT - ASSESSMENT
88 yo F PMHx T2DM, HTN, HLD, GERD, CAD (s/p LAD 2019 with stents placed on DAPT) brought in by Daughter for fever, weakness, vomiting and brought to ER. Noted to have left distal humerus fracture, ESBL E coli bacteremia and dilated biliary ducts with cholelithiasis and elevated AST/ALT: 107/97, alkphos: 180, total bilirubin: 1.3    RECOMMENDATIONS    ESBL E coli bacteremia with most compelling localization being the biliary system with dilated ducts and cholelithiasis, agree with MR CHOLANGIOPANCREATOGRAPHY, read pending. Exam is uncharacteristic with no RUQ pain, negative Mittal's sign but noted biochemical support as well as imaging. With concerns for polymicrobial infection and ESBL isolated  -escalated to meropenem  -ordered repeat blood cultures for am  -further recs to follow.    Thank you for consulting us and involving us in the management of this most interesting and challenging case.  We will follow along in the care of this patient. Please call us at [phone] or text me directly on my cell# at [phone] with any concerns.

## 2021-05-25 NOTE — PHYSICAL THERAPY INITIAL EVALUATION ADULT - ADDITIONAL COMMENTS
Pt lives in a private home with 2 steps to enter and a flight of stairs to bedroom. Pt uses a cane outdoors.  Pt's daughter is her HHA several days a week. Daughter present in room to interpret. Pt lives in a private home with 2 steps to enter and a flight of stairs to bedroom. Pt uses a cane outdoors.  Pt's daughter is her HHA several days a week. Daughter present in room to interpret.  PMHx T2DM, HTN, HLD, GERD, CAD (s/p LAD 2019 with stents placed on DAPT) brought in by Daughter for fever, weakness. Patient states that she fell a week ago and since then she has been having pain in her  left arm and left chest. Patient also complaining of constipation, feeling as if she cannot have a bowel movement.

## 2021-05-25 NOTE — CONSULT NOTE ADULT - SUBJECTIVE AND OBJECTIVE BOX
*****CHARTING IN PROCESS*****    Patient is a 89y old  Female who presents with a chief complaint of sepsis (25 May 2021 11:09)      HPI:  89 yo F PMHx T2DM, HTN, HLD, GERD, CAD (s/p LAD 2019 with stents placed on DAPT) brought in by Daughter for fever, weakness. Patient states that she fell a week ago and since then she has been having pain in her left arm and left chest. Patient also complaining of constipation, feeling as if she cannot have a bowel movement.     History taken by the daughter who lives with her and is her primary caretaker. Patient had a mechanical fall May 12th in the grocery store.As per the daughter, patient was in the grocery store. Upon leaving the store, the patient seemed to trip and fall on her left side, did not strike her head. Daughter said she was in some pain at the time, and gave her Bengay cream for the left arm pain, no swelling noted at the time. The following day, she found that her mother had severe swelling from her shoulder to her hand, and bruising. Patient was also complaining of severe pain. Daughter brought her to an orthopedist, who she follows for chronic shoulder pain. X rays were done and daughter states that she was sent home. Patient was complaining of severe pain, and inability to  her arm and complaining of left sided rib pain, worse with inhalation. Daughter brought her to [facility] in [place] who prescribed her oxycodone and a five day course of prednisone for the pain. She completed the five day course and was taking oxycodone tablets once a day at bedtime everyday within adequate control of the pain. Yesterday, patient complaining of decreased appetite, appeared more lethargic than usual. Patient was tolerating small bites of crackers, toast. Last night, daughter took her blood pressure and it was 140/70. This morning, daughter checked her temperature which was 101. She gave her tyelenol X 1, which improved the fever. Patient had an appointment to the hand specialist referred to be orthopedist and upon arriving to the office, patient vomited and was not feeling well. Daughter then brought her to the ER instead.     Patient speaks Tita and Janelle. Denies any changes in her mental status, shortness of breath, focal weakness, abdominal pain, diarrhea, blood in stool, new rashes, sick contacts, or positive COVID contacts or dysuria, or hematuria.     ED Course:   BP 66/47 HR 57 SpO2 98 RA T 97 F   given zosyn X 1, 2L NS bolus with improvement in BP   EKG: sinus bradycardia @ 53 bpm, no ST or T wave changes noted   CT A/P and CT Chest: BONES: Chronic appearing left anterior rib fractures. Cholelithiasis. Dilated common bile duct.  X ray left elbow:  Distal LEFT humerus T-shaped intra-articular, intercondylar fracture deformity.  Labs: lactate 2.5, BUN/Cr 36/1.8, AST/ALT: 107/97, alkphos: 180, total bilirubin: 1.3; troponin negative (24 May 2021 20:10)        INTERVAL HPI: Patient was seen and examined at bedside. Pt speaks Janelle, interpretor was used (ID: [id number]), pt is a poor historian and but reports that she had a fall while she was in the grocery store. Pt's daughter Ilene was contacted to obtain further history. Pt has CAD s/p 2 stents in the LAD/D1 placed in 2015. Pt had an angiogram in 2019 with Cardiologist Dr. Salgado. pt saw cardio a few months ago, workup was negative.   As per daughter pt can walk a flight of stairs with her cane with minimal SOB which resolves after drinking water. Pt does not have any reactions to anesthesia as to daughter's knowledge. No known history of CHF, TIA/stroke. Pt does have a history of diabetes, not on insulin. No active tobacco/drug use, no known history of TRAVIS.         PAST MEDICAL & SURGICAL HISTORY:  Diabetes mellitus    CAD (coronary artery disease)  2 cardiac stents placed    Hypertension    Hyperlipidemia    Bilateral cataracts        MEDICATIONS  (STANDING):  aspirin enteric coated 81 milliGRAM(s) Oral daily  dextrose 40% Gel 15 Gram(s) Oral once  dextrose 5% + sodium chloride 0.9%. 1000 milliLiter(s) (75 mL/Hr) IV Continuous <Continuous>  dextrose 5%. 1000 milliLiter(s) (50 mL/Hr) IV Continuous <Continuous>  dextrose 5%. 1000 milliLiter(s) (100 mL/Hr) IV Continuous <Continuous>  dextrose 50% Injectable 25 Gram(s) IV Push once  dextrose 50% Injectable 12.5 Gram(s) IV Push once  dextrose 50% Injectable 25 Gram(s) IV Push once  glucagon  Injectable 1 milliGRAM(s) IntraMuscular once  heparin   Injectable 5000 Unit(s) SubCutaneous every 12 hours  insulin lispro (ADMELOG) corrective regimen sliding scale   SubCutaneous every 6 hours  melatonin 5 milliGRAM(s) Oral at bedtime  meropenem  IVPB 500 milliGRAM(s) IV Intermittent every 12 hours  pantoprazole    Tablet 40 milliGRAM(s) Oral before breakfast  senna 2 Tablet(s) Oral at bedtime  sucralfate 1 Gram(s) Oral every 12 hours    MEDICATIONS  (PRN):  acetaminophen   Tablet .. 650 milliGRAM(s) Oral every 6 hours PRN Mild Pain (1 - 3)  hydrALAZINE Injectable 10 milliGRAM(s) IV Push every 6 hours PRN For sbp>140  morphine  - Injectable 2 milliGRAM(s) IV Push every 4 hours PRN Severe Pain (7 - 10)  ondansetron Injectable 4 milliGRAM(s) IV Push every 8 hours PRN Nausea and/or Vomiting  polyethylene glycol 3350 17 Gram(s) Oral daily PRN Constipation      FAMILY HISTORY:  FH: MI (myocardial infarction) (Child)      Denies Family history of CAD or early MI      ROS: Limited ROS, history obtained from daughter       SOCIAL HISTORY:   No tobacco, alcohol or drug use    Vital Signs Last 24 Hrs  T(C): 37.1 (25 May 2021 04:20), Max: 37.1 (25 May 2021 04:20)  T(F): 98.7 (25 May 2021 04:20), Max: 98.7 (25 May 2021 04:20)  HR: 65 (25 May 2021 04:20) (54 - 65)  BP: 166/74 (25 May 2021 04:20) (66/47 - 166/74)  BP(mean): --  RR: 18 (25 May 2021 04:20) (17 - 19)  SpO2: 94% (25 May 2021 04:20) (94% - 98%)    Physical Exam:  General: Well developed, well nourished, NAD  HEENT: NCAT, PERRLA, EOMI bl, moist mucous membranes   Neck: Supple, nontender, no mass  Neurology: nonfocal, sensation intact   Respiratory: CTA B/L, No W/R/R  CV: RRR, +S1/S2, no murmurs, rubs or gallops  Abdominal: Soft, NT, ND +BSx4, no palpable masses  Extremities: No C/C/E, + peripheral pulses      I&O's Detail    24 May 2021 07:01  -  25 May 2021 07:00  --------------------------------------------------------  IN:    IV PiggyBack: 100 mL    sodium chloride 0.9%: 600 mL  Total IN: 700 mL    OUT:  Total OUT: 0 mL    Total NET: 700 mL          LABS:                        11.6   10.10 )-----------( 402      ( 24 May 2021 15:29 )             35.3     05-24    136  |  102  |  36<H>  ----------------------------<  192<H>  5.0   |  27  |  1.80<H>    Ca    8.5      24 May 2021 15:29    TPro  7.0  /  Alb  3.2<L>  /  TBili  1.3<H>  /  DBili  x   /  AST  107<H>  /  ALT  97<H>  /  AlkPhos  180<H>  05-24    CARDIAC MARKERS ( 24 May 2021 15:29 )  .034 ng/mL / x     / x     / x     / x          PT/INR - ( 24 May 2021 15:29 )   PT: 13.6 sec;   INR: 1.17 ratio         PTT - ( 24 May 2021 15:29 )  PTT:26.6 sec    I&O's Summary    24 May 2021 07:01  -  25 May 2021 07:00  --------------------------------------------------------  IN: 700 mL / OUT: 0 mL / NET: 700 mL      BNPSerum Pro-Brain Natriuretic Peptide: 1103 pg/mL (05-24 @ 15:29)      RADIOLOGY & ADDITIONAL STUDIES:    < from: Xray Chest 1 View-PORTABLE IMMEDIATE (05.24.21 @ 15:41) >  IMPRESSION: Heart enlargement and prominent aorta. Clear lungs.    < from: CT Chest No Cont (05.24.21 @ 17:33) >    IMPRESSION: Cholelithiasis. Dilated common bile duct.          New ECG(s): Personally reviewed  sinus bradycardia HR: 53    Echo: No recent echo on record     Cath: 2019-stable angina with normal EF 65%   Patient is a 89y old  Female who presents with a chief complaint of sepsis (25 May 2021 11:09)      HPI:  89 yo F PMHx T2DM, HTN, HLD, GERD, CAD (s/p LAD 2019 with stents placed on DAPT) brought in by Daughter for fever, weakness. Patient states that she fell a week ago and since then she has been having pain in her left arm and left chest. Patient also complaining of constipation, feeling as if she cannot have a bowel movement.     History taken by the daughter who lives with her and is her primary caretaker. Patient had a mechanical fall May 12th in the grocery store.As per the daughter, patient was in the grocery store. Upon leaving the store, the patient seemed to trip and fall on her left side, did not strike her head. Daughter said she was in some pain at the time, and gave her Bengay cream for the left arm pain, no swelling noted at the time. The following day, she found that her mother had severe swelling from her shoulder to her hand, and bruising. Patient was also complaining of severe pain. Daughter brought her to an orthopedist, who she follows for chronic shoulder pain. X rays were done and daughter states that she was sent home. Patient was complaining of severe pain, and inability to  her arm and complaining of left sided rib pain, worse with inhalation. Daughter brought her to [facility] in [place] who prescribed her oxycodone and a five day course of prednisone for the pain. She completed the five day course and was taking oxycodone tablets once a day at bedtime everyday within adequate control of the pain. Yesterday, patient complaining of decreased appetite, appeared more lethargic than usual. Patient was tolerating small bites of crackers, toast. Last night, daughter took her blood pressure and it was 140/70. This morning, daughter checked her temperature which was 101. She gave her tyelenol X 1, which improved the fever. Patient had an appointment to the hand specialist referred to be orthopedist and upon arriving to the office, patient vomited and was not feeling well. Daughter then brought her to the ER instead.     Patient speaks Tita and Janelle. Denies any changes in her mental status, shortness of breath, focal weakness, abdominal pain, diarrhea, blood in stool, new rashes, sick contacts, or positive COVID contacts or dysuria, or hematuria.     ED Course:   BP 66/47 HR 57 SpO2 98 RA T 97 F   given zosyn X 1, 2L NS bolus with improvement in BP   EKG: sinus bradycardia @ 53 bpm, no ST or T wave changes noted   CT A/P and CT Chest: BONES: Chronic appearing left anterior rib fractures. Cholelithiasis. Dilated common bile duct.  X ray left elbow:  Distal LEFT humerus T-shaped intra-articular, intercondylar fracture deformity.  Labs: lactate 2.5, BUN/Cr 36/1.8, AST/ALT: 107/97, alkphos: 180, total bilirubin: 1.3; troponin negative (24 May 2021 20:10)        INTERVAL HPI: Patient was seen and examined at bedside. Pt speaks Janelle, interpretor was used (ID: [id number]), pt is a poor historian and but reports that she had a fall while she was in the grocery store. Pt's daughter Ilene was contacted to obtain further history. Pt has CAD s/p 2 stents in the LAD/D1 placed in 2015. Pt had an angiogram in 2019 with Cardiologist Dr. Salgado. pt saw cardio a few months ago, workup was negative.   As per daughter pt can walk a flight of stairs with her cane with minimal SOB which resolves after drinking water. Pt does not have any reactions to anesthesia as to daughter's knowledge, but has only had cataract surgery. No known history of CHF, TIA/stroke. Pt does have a history of diabetes, but not on insulin. No active tobacco/drug use, no known history of TRAVIS.         PAST MEDICAL & SURGICAL HISTORY:  Diabetes mellitus    CAD (coronary artery disease)  2 cardiac stents placed    Hypertension    Hyperlipidemia    Bilateral cataracts        MEDICATIONS  (STANDING):  aspirin enteric coated 81 milliGRAM(s) Oral daily  dextrose 40% Gel 15 Gram(s) Oral once  dextrose 5% + sodium chloride 0.9%. 1000 milliLiter(s) (75 mL/Hr) IV Continuous <Continuous>  dextrose 5%. 1000 milliLiter(s) (50 mL/Hr) IV Continuous <Continuous>  dextrose 5%. 1000 milliLiter(s) (100 mL/Hr) IV Continuous <Continuous>  dextrose 50% Injectable 25 Gram(s) IV Push once  dextrose 50% Injectable 12.5 Gram(s) IV Push once  dextrose 50% Injectable 25 Gram(s) IV Push once  glucagon  Injectable 1 milliGRAM(s) IntraMuscular once  heparin   Injectable 5000 Unit(s) SubCutaneous every 12 hours  insulin lispro (ADMELOG) corrective regimen sliding scale   SubCutaneous every 6 hours  melatonin 5 milliGRAM(s) Oral at bedtime  meropenem  IVPB 500 milliGRAM(s) IV Intermittent every 12 hours  pantoprazole    Tablet 40 milliGRAM(s) Oral before breakfast  senna 2 Tablet(s) Oral at bedtime  sucralfate 1 Gram(s) Oral every 12 hours    MEDICATIONS  (PRN):  acetaminophen   Tablet .. 650 milliGRAM(s) Oral every 6 hours PRN Mild Pain (1 - 3)  hydrALAZINE Injectable 10 milliGRAM(s) IV Push every 6 hours PRN For sbp>140  morphine  - Injectable 2 milliGRAM(s) IV Push every 4 hours PRN Severe Pain (7 - 10)  ondansetron Injectable 4 milliGRAM(s) IV Push every 8 hours PRN Nausea and/or Vomiting  polyethylene glycol 3350 17 Gram(s) Oral daily PRN Constipation      FAMILY HISTORY:  FH: MI (myocardial infarction) (Child)      Denies Family history of CAD or early MI      ROS: Limited ROS, history obtained from daughter       SOCIAL HISTORY:   No tobacco, alcohol or drug use    Vital Signs Last 24 Hrs  T(C): 37.1 (25 May 2021 04:20), Max: 37.1 (25 May 2021 04:20)  T(F): 98.7 (25 May 2021 04:20), Max: 98.7 (25 May 2021 04:20)  HR: 65 (25 May 2021 04:20) (54 - 65)  BP: 166/74 (25 May 2021 04:20) (66/47 - 166/74)  BP(mean): --  RR: 18 (25 May 2021 04:20) (17 - 19)  SpO2: 94% (25 May 2021 04:20) (94% - 98%)    Physical Exam:  General: Well developed, well nourished, NAD  HEENT: NCAT, PERRLA, EOMI bl, moist mucous membranes   Neck: Supple, nontender, no mass  Neurology: nonfocal, sensation intact   Respiratory: CTA B/L, No W/R/R  CV: RRR, +S1/S2, soft systolic murmur   Abdominal: Soft, NT, ND +BSx4, no palpable masses  Extremities: No C/C/E, + peripheral pulses      I&O's Detail    24 May 2021 07:01  -  25 May 2021 07:00  --------------------------------------------------------  IN:    IV PiggyBack: 100 mL    sodium chloride 0.9%: 600 mL  Total IN: 700 mL    OUT:  Total OUT: 0 mL    Total NET: 700 mL          LABS:                        11.6   10.10 )-----------( 402      ( 24 May 2021 15:29 )             35.3     05-24    136  |  102  |  36<H>  ----------------------------<  192<H>  5.0   |  27  |  1.80<H>    Ca    8.5      24 May 2021 15:29    TPro  7.0  /  Alb  3.2<L>  /  TBili  1.3<H>  /  DBili  x   /  AST  107<H>  /  ALT  97<H>  /  AlkPhos  180<H>  05-24    CARDIAC MARKERS ( 24 May 2021 15:29 )  .034 ng/mL / x     / x     / x     / x          PT/INR - ( 24 May 2021 15:29 )   PT: 13.6 sec;   INR: 1.17 ratio         PTT - ( 24 May 2021 15:29 )  PTT:26.6 sec    I&O's Summary    24 May 2021 07:01  -  25 May 2021 07:00  --------------------------------------------------------  IN: 700 mL / OUT: 0 mL / NET: 700 mL      BNPSerum Pro-Brain Natriuretic Peptide: 1103 pg/mL (05-24 @ 15:29)      RADIOLOGY & ADDITIONAL STUDIES:    < from: Xray Chest 1 View-PORTABLE IMMEDIATE (05.24.21 @ 15:41) >  IMPRESSION: Heart enlargement and prominent aorta. Clear lungs.    < from: CT Chest No Cont (05.24.21 @ 17:33) >    IMPRESSION: Cholelithiasis. Dilated common bile duct.          New ECG(s): Personally reviewed  sinus bradycardia HR: 53    Echo: No recent echo on record     Cath: 2019-Nonobstructive CAD, patent stents, EF: 65% Patient is a 89y old  Female who presents with a chief complaint of sepsis (25 May 2021 11:09)      HPI:  89 yo F PMHx T2DM, HTN, HLD, GERD, CAD (s/p LAD 2019 with stents placed on DAPT) brought in by Daughter for fever, weakness. Patient states that she fell a week ago and since then she has been having pain in her left arm and left chest. Patient also complaining of constipation, feeling as if she cannot have a bowel movement.     History provided by the daughter who lives with her and is her primary caretaker. Patient had a mechanical fall May 12th in the grocery store. As per the daughter, patient was in the grocery store. Upon leaving the store, the patient seemed to trip and fall on her left side, did not strike her head. Daughter said she was in some pain at the time, and gave her Bengay cream for the left arm pain, no swelling noted at the time. The following day, she found that her mother had severe swelling from her shoulder to her hand, and bruising. Patient was also complaining of severe pain. Daughter brought her to an orthopedist, who she follows for chronic shoulder pain. X rays were done and daughter states that she was sent home. Patient was complaining of severe pain, and inability to  her arm and complaining of left sided rib pain, worse with inhalation. Daughter brought her to [facility] in [place] who prescribed her oxycodone and a five day course of prednisone for the pain. She completed the five day course and was taking oxycodone tablets once a day at bedtime everyday within adequate control of the pain. Yesterday, patient complaining of decreased appetite, appeared more lethargic than usual. Patient was tolerating small bites of crackers, toast. Last night, daughter took her blood pressure and it was 140/70. This morning, daughter checked her temperature which was 101. She gave her tyelenol X 1, which improved the fever. Patient had an appointment to the hand specialist referred to be orthopedist and upon arriving to the office, patient vomited and was not feeling well. Daughter then brought her to the ER instead.     Patient speaks Tita and Janelle. Denies any changes in her mental status, shortness of breath, focal weakness, abdominal pain, diarrhea, blood in stool, new rashes, sick contacts, or positive COVID contacts or dysuria, or hematuria.     ED Course:   BP 66/47 HR 57 SpO2 98 RA T 97 F   given zosyn X 1, 2L NS bolus with improvement in BP   EKG: sinus bradycardia @ 53 bpm, no ST or T wave changes noted   CT A/P and CT Chest: BONES: Chronic appearing left anterior rib fractures. Cholelithiasis. Dilated common bile duct.  X ray left elbow:  Distal LEFT humerus T-shaped intra-articular, intercondylar fracture deformity.  Labs: lactate 2.5, BUN/Cr 36/1.8, AST/ALT: 107/97, alkphos: 180, total bilirubin: 1.3; troponin negative (24 May 2021 20:10)        INTERVAL HPI: Patient was seen and examined at bedside. Pt speaks Janelle, interpretor was used (ID: [id number]), pt is a poor historian and but reports that she had a fall while she was in the grocery store. Pt's daughter Ilene was contacted to obtain further history. Pt has CAD s/p 2 stents in the LAD/D1 placed in 2015. Pt had an angiogram in 2019 with Cardiologist and was found to have nonobstructive cad and a normal ef. pt saw cardio a few months ago, workup was negative.   As per daughter pt can walk a flight of stairs with her cane with minimal SOB which resolves after drinking water. Pt does not have any reactions to anesthesia as to daughter's knowledge, but has only had cataract surgery. No known history of CHF, TIA/stroke. Pt does have a history of diabetes, but not on insulin. No active tobacco/drug use, no known history of TRAVIS.         PAST MEDICAL & SURGICAL HISTORY:  Diabetes mellitus    CAD (coronary artery disease)  2 cardiac stents placed    Hypertension    Hyperlipidemia    Bilateral cataracts        MEDICATIONS  (STANDING):  aspirin enteric coated 81 milliGRAM(s) Oral daily  dextrose 40% Gel 15 Gram(s) Oral once  dextrose 5% + sodium chloride 0.9%. 1000 milliLiter(s) (75 mL/Hr) IV Continuous <Continuous>  dextrose 5%. 1000 milliLiter(s) (50 mL/Hr) IV Continuous <Continuous>  dextrose 5%. 1000 milliLiter(s) (100 mL/Hr) IV Continuous <Continuous>  dextrose 50% Injectable 25 Gram(s) IV Push once  dextrose 50% Injectable 12.5 Gram(s) IV Push once  dextrose 50% Injectable 25 Gram(s) IV Push once  glucagon  Injectable 1 milliGRAM(s) IntraMuscular once  heparin   Injectable 5000 Unit(s) SubCutaneous every 12 hours  insulin lispro (ADMELOG) corrective regimen sliding scale   SubCutaneous every 6 hours  melatonin 5 milliGRAM(s) Oral at bedtime  meropenem  IVPB 500 milliGRAM(s) IV Intermittent every 12 hours  pantoprazole    Tablet 40 milliGRAM(s) Oral before breakfast  senna 2 Tablet(s) Oral at bedtime  sucralfate 1 Gram(s) Oral every 12 hours    MEDICATIONS  (PRN):  acetaminophen   Tablet .. 650 milliGRAM(s) Oral every 6 hours PRN Mild Pain (1 - 3)  hydrALAZINE Injectable 10 milliGRAM(s) IV Push every 6 hours PRN For sbp>140  morphine  - Injectable 2 milliGRAM(s) IV Push every 4 hours PRN Severe Pain (7 - 10)  ondansetron Injectable 4 milliGRAM(s) IV Push every 8 hours PRN Nausea and/or Vomiting  polyethylene glycol 3350 17 Gram(s) Oral daily PRN Constipation      FAMILY HISTORY:  FH: MI (myocardial infarction) (Child)      Denies Family history of CAD or early MI      ROS: Limited ROS, history obtained from daughter       SOCIAL HISTORY:   No tobacco, alcohol or drug use    Vital Signs Last 24 Hrs  T(C): 37.1 (25 May 2021 04:20), Max: 37.1 (25 May 2021 04:20)  T(F): 98.7 (25 May 2021 04:20), Max: 98.7 (25 May 2021 04:20)  HR: 65 (25 May 2021 04:20) (54 - 65)  BP: 166/74 (25 May 2021 04:20) (66/47 - 166/74)  BP(mean): --  RR: 18 (25 May 2021 04:20) (17 - 19)  SpO2: 94% (25 May 2021 04:20) (94% - 98%)    Physical Exam:  General: Well developed, well nourished, NAD  HEENT: NCAT, PERRLA, EOMI bl, moist mucous membranes   Neck: Supple, nontender, no mass  Neurology: nonfocal, sensation intact   Respiratory: CTA B/L, No W/R/R  CV: RRR, +S1/S2, soft systolic murmur   Abdominal: Soft, NT, ND +BSx4, no palpable masses  Extremities: No C/C/E, + peripheral pulses      I&O's Detail    24 May 2021 07:01  -  25 May 2021 07:00  --------------------------------------------------------  IN:    IV PiggyBack: 100 mL    sodium chloride 0.9%: 600 mL  Total IN: 700 mL    OUT:  Total OUT: 0 mL    Total NET: 700 mL          LABS:                        11.6   10.10 )-----------( 402      ( 24 May 2021 15:29 )             35.3     05-24    136  |  102  |  36<H>  ----------------------------<  192<H>  5.0   |  27  |  1.80<H>    Ca    8.5      24 May 2021 15:29    TPro  7.0  /  Alb  3.2<L>  /  TBili  1.3<H>  /  DBili  x   /  AST  107<H>  /  ALT  97<H>  /  AlkPhos  180<H>  05-24    CARDIAC MARKERS ( 24 May 2021 15:29 )  .034 ng/mL / x     / x     / x     / x          PT/INR - ( 24 May 2021 15:29 )   PT: 13.6 sec;   INR: 1.17 ratio         PTT - ( 24 May 2021 15:29 )  PTT:26.6 sec    I&O's Summary    24 May 2021 07:01  -  25 May 2021 07:00  --------------------------------------------------------  IN: 700 mL / OUT: 0 mL / NET: 700 mL      BNPSerum Pro-Brain Natriuretic Peptide: 1103 pg/mL (05-24 @ 15:29)      RADIOLOGY & ADDITIONAL STUDIES:    < from: Xray Chest 1 View-PORTABLE IMMEDIATE (05.24.21 @ 15:41) >  IMPRESSION: Heart enlargement and prominent aorta. Clear lungs.    < from: CT Chest No Cont (05.24.21 @ 17:33) >    IMPRESSION: Cholelithiasis. Dilated common bile duct.          New ECG(s): Personally reviewed  sinus bradycardia HR: 53    Echo: No recent echo on record     Cath: 2019-Nonobstructive CAD, patent stents, EF: 65%

## 2021-05-25 NOTE — CONSULT NOTE ADULT - SUBJECTIVE AND OBJECTIVE BOX
[facility] DIVISION of INFECTIOUS DISEASE  Cheko Gallo MD PhD, Genesis Hare MD, Brianda Alexander MD, Berkley Salgado MD  and providing coverage with Bety Galeas MD and Toby Vang MD  Providing Infectious Disease Consultations at [facility], [facility], [facility]'s    Office# [phone] to schedule follow up appointments  Answering Service for urgent calls or New Consults [phone]  Cell# to text for urgent issues Cheko Sabino [phone]     HPI:  88 yo F PMHx T2DM, HTN, HLD, GERD, CAD (s/p LAD 2019 with stents placed on DAPT) brought in by Daughter for fever, weakness. Patient states that she fell a week ago and since then she has been having pain in her left arm and left chest. Patient also complaining of constipation, feeling as if she cannot have a bowel movement.  Patient had a mechanical fall May 12th in the grocery store. Pt in some pain at the time, and gave her Bengay cream for the left arm pain, no swelling noted at the time. The following day, she found that her mother had severe swelling from her shoulder to her hand, and bruising. Patient was also complaining of severe pain. Pt then developed fever and vomiting and brought to ER.    Patient speaks Tita and Janelle. Denies any changes in her mental status, shortness of breath, focal weakness, abdominal pain, diarrhea, blood in stool, new rashes, sick contacts, or positive COVID contacts or dysuria, or hematuria.     ED Course:   BP 66/47 HR 57 SpO2 98 RA T 97 F   given zosyn X 1, 2L NS bolus with improvement in BP   EKG: sinus bradycardia @ 53 bpm, no ST or T wave changes noted   CT A/P and CT Chest: BONES: Chronic appearing left anterior rib fractures. Cholelithiasis. Dilated common bile duct.  X ray left elbow:  Distal LEFT humerus T-shaped intra-articular, intercondylar fracture deformity.  Labs: lactate 2.5, BUN/Cr 36/1.8, AST/ALT: 107/97, alkphos: 180, total bilirubin: 1.3; troponin negative (24 May 2021 20:10)      PAST MEDICAL & SURGICAL HISTORY:  Diabetes mellitus    CAD (coronary artery disease)  2 cardiac stents placed    Hypertension    Hyperlipidemia    Bilateral cataracts        Antimicrobials  piperacillin/tazobactam IVPB.. 3.375 Gram(s) IV Intermittent every 8 hours      Immunological      Other  acetaminophen   Tablet .. 650 milliGRAM(s) Oral every 6 hours PRN  aspirin enteric coated 81 milliGRAM(s) Oral daily  dextrose 40% Gel 15 Gram(s) Oral once  dextrose 5% + sodium chloride 0.9%. 1000 milliLiter(s) IV Continuous <Continuous>  dextrose 5%. 1000 milliLiter(s) IV Continuous <Continuous>  dextrose 5%. 1000 milliLiter(s) IV Continuous <Continuous>  dextrose 50% Injectable 25 Gram(s) IV Push once  dextrose 50% Injectable 12.5 Gram(s) IV Push once  dextrose 50% Injectable 25 Gram(s) IV Push once  glucagon  Injectable 1 milliGRAM(s) IntraMuscular once  heparin   Injectable 5000 Unit(s) SubCutaneous every 12 hours  hydrALAZINE Injectable 10 milliGRAM(s) IV Push every 6 hours PRN  insulin lispro (ADMELOG) corrective regimen sliding scale   SubCutaneous every 6 hours  melatonin 5 milliGRAM(s) Oral at bedtime  morphine  - Injectable 2 milliGRAM(s) IV Push every 4 hours PRN  ondansetron Injectable 4 milliGRAM(s) IV Push every 8 hours PRN  pantoprazole    Tablet 40 milliGRAM(s) Oral before breakfast  polyethylene glycol 3350 17 Gram(s) Oral daily PRN  senna 2 Tablet(s) Oral at bedtime  sucralfate 1 Gram(s) Oral every 12 hours      Allergies    No Known Allergies    Intolerances        SOCIAL HISTORY:  Social History:  Tobacco: denies  ETOH: denies  Rec Drug Use: denies  Lives at home with Daughter, Son in Law and Grandson  Ambulates with Cane  Requires assistance for ADLS (24 May 2021 20:10)      FAMILY HISTORY:  FH: MI (myocardial infarction) (Child)        ROS:    limited    Vital Signs Last 24 Hrs  T(C): 37.1 (25 May 2021 04:20), Max: 37.1 (25 May 2021 04:20)  T(F): 98.7 (25 May 2021 04:20), Max: 98.7 (25 May 2021 04:20)  HR: 65 (25 May 2021 04:20) (54 - 65)  BP: 166/74 (25 May 2021 04:20) (66/47 - 166/74)  BP(mean): --  RR: 18 (25 May 2021 04:20) (17 - 19)  SpO2: 94% (25 May 2021 04:20) (94% - 98%)    PE:  WDWN in no distress  HEENT:  NC, PERRL, sclerae anicteric, conjunctivae clear, EOMI.  Sinuses nontender, no nasal exudate.  No buccal or pharyngeal lesions, erythema or exudate  Neck:  Supple, no adenopathy  Lungs:  No accessory muscle use, bilaterally clear to auscultation  Cor:  RRR, S1, S2, no murmur appreciated  Abd:  Symmetric, normoactive BS.  Soft, LLQ tender, no masses, guarding or rebound.  Liver and spleen not enlarged  Extrem:  No cyanosis or edema  Skin:  No rashes.  Neuro: grossly intact  Musc: moving all limbs freely, no focal deficits        LABS:                        11.6   10.10 )-----------( 402      ( 24 May 2021 15:29 )             35.3       WBC Count: 10.10 K/uL (05-24-21 @ 15:29)      05-24    136  |  102  |  36<H>  ----------------------------<  192<H>  5.0   |  27  |  1.80<H>    Ca    8.5      24 May 2021 15:29    TPro  7.0  /  Alb  3.2<L>  /  TBili  1.3<H>  /  DBili  x   /  AST  107<H>  /  ALT  97<H>  /  AlkPhos  180<H>  05-24      Creatinine, Serum: 1.80 mg/dL (05-24-21 @ 15:29)        MICROBIOLOGY:  Culture Results:   Growth in aerobic bottle: Gram Negative Rods  MDRO detected in BCID PCR, resistance marker = CTX-M (ESBL)  ***Blood Panel PCR results on this specimen are available  approximately 3 hours after the Gram stain result.***  Gram stain, PCR, and/or culture results may not always  correspond due to difference in methodologies.  ************************************************************  This PCR assay was performed by multiplex PCR. This  Assay tests for 66 bacterial and resistance gene targets.  Please refer to the [facility] [employer] test directory  at [email or web address] for details. (05-24 @ 22:07)      RADIOLOGY & ADDITIONAL STUDIES:    --

## 2021-05-25 NOTE — CONSULT NOTE ADULT - PROBLEM SELECTOR RECOMMENDATION 9
89 YO Female w/ PMHx of HTN, HLD, DM, s/p cardiac stents x 2 on ASA & Plavix, s/p b/l cataract surgery p/w cholelithiasis w/ elevated LFTs, t.bili, and Alk phos. Pt presently afebrile and abdomen is nontender. MRCP revealed 14mm CBD, gallstones and was neg for choledocholithiasis.   - F/up HIDA scan for tm  - Keep NPO after midnight for exam  - Cont abx per ID  - Cont care per primary team  - Case to be discussed with Dr. Caceres

## 2021-05-25 NOTE — CONSULT NOTE ADULT - ATTENDING COMMENTS
known cad s/p pci  cath without sig obstr disease in 2019, and had a normal ef then  presents with apparent hepatobiliary sepsis  hypotensive initially, now improved  resume bb   cont asa and can hold plavix, given time from her pci no need to resume  resume other bp meds in time pending clinical course  abx  for mrcp and ?ercp  optimized for low risk ercp if needed, from a cv perspective, routine hemodyn monitoring is recommended

## 2021-05-25 NOTE — CONSULT NOTE ADULT - SUBJECTIVE AND OBJECTIVE BOX
HPI:  89 yo F PMHx T2DM, HTN, HLD, GERD, CAD (s/p LAD 2019 with stents placed on DAPT) brought in by Daughter for fever, weakness. Patient states that she fell a week ago and since then she has been having pain in her left arm and left chest. Patient also complaining of constipation, feeling as if she cannot have a bowel movement.     History taken by the daughter who lives with her and is her primary caretaker. Patient had a mechanical fall May 12th in the grocery store.As per the daughter, patient was in the grocery store. Upon leaving the store, the patient seemed to trip and fall on her left side, did not strike her head. Daughter said she was in some pain at the time, and gave her Bengay cream for the left arm pain, no swelling noted at the time. The following day, she found that her mother had severe swelling from her shoulder to her hand, and bruising. Patient was also complaining of severe pain. Daughter brought her to an orthopedist, who she follows for chronic shoulder pain. X rays were done and daughter states that she was sent home. Patient was complaining of severe pain, and inability to  her arm and complaining of left sided rib pain, worse with inhalation. Daughter brought her to [facility] in [place] who prescribed her oxycodone and a five day course of prednisone for the pain. She completed the five day course and was taking oxycodone tablets once a day at bedtime everyday within adequate control of the pain. Yesterday, patient complaining of decreased appetite, appeared more lethargic than usual. Patient was tolerating small bites of crackers, toast. Last night, daughter took her blood pressure and it was 140/70. This morning, daughter checked her temperature which was 101. She gave her tyelenol X 1, which improved the fever. Patient had an appointment to the hand specialist referred to be orthopedist and upon arriving to the office, patient vomited and was not feeling well. Daughter then brought her to the ER instead.     Patient speaks Tita and Janelle. Denies any changes in her mental status, shortness of breath, focal weakness, abdominal pain, diarrhea, blood in stool, new rashes, sick contacts, or positive COVID contacts or dysuria, or hematuria.     Interval History:  Surgery consulted due to finding of cholelithiasis on CT and MRCP. Patient's daughter at bedside and translating for patient. According to daughter, patient never complained of abdominal pain, but has been experiencing decreased appetite, fever w Tmax of 102 yesterday, constipation, and nausea since yesterday morning. Pt and family aware that pt had cholelithiasis and was told by PCP in previous visit to leave it untreated unless pain occurs. Denies h/o previous abdominal surgeries, chills, chest pain, shortness of breath, abdominal pain, nausea at present, vomiting, and diarrhea.    PAST MEDICAL & SURGICAL HISTORY:  Diabetes mellitus  CAD (coronary artery disease), 2 cardiac stents placed  Hypertension  Hyperlipidemia  Bilateral cataracts    REVIEW OF SYSTEMS:  CONSTITUTIONAL: No weakness, fevers or chills  EYES/ENT: No visual changes;  No vertigo or throat pain   NECK: No pain or stiffness  RESPIRATORY: No cough, wheezing, hemoptysis; No shortness of breath  CARDIOVASCULAR: No chest pain or palpitations  GASTROINTESTINAL: See HPI  GENITOURINARY: No dysuria, frequency or hematuria  NEUROLOGICAL: No numbness or weakness  SKIN: No itching, burning, rashes, or lesions   All other review of systems is negative unless indicated above.    MEDICATIONS  (STANDING):  aspirin enteric coated 81 milliGRAM(s) Oral daily  dextrose 40% Gel 15 Gram(s) Oral once  dextrose 5%. 1000 milliLiter(s) (50 mL/Hr) IV Continuous <Continuous>  dextrose 5%. 1000 milliLiter(s) (100 mL/Hr) IV Continuous <Continuous>  dextrose 50% Injectable 25 Gram(s) IV Push once  dextrose 50% Injectable 12.5 Gram(s) IV Push once  dextrose 50% Injectable 25 Gram(s) IV Push once  glucagon  Injectable 1 milliGRAM(s) IntraMuscular once  heparin   Injectable 5000 Unit(s) SubCutaneous every 12 hours  insulin lispro (ADMELOG) corrective regimen sliding scale   SubCutaneous every 6 hours  melatonin 5 milliGRAM(s) Oral at bedtime  meropenem  IVPB 500 milliGRAM(s) IV Intermittent every 12 hours  pantoprazole    Tablet 40 milliGRAM(s) Oral before breakfast  senna 2 Tablet(s) Oral at bedtime  sucralfate 1 Gram(s) Oral every 12 hours    MEDICATIONS  (PRN):  acetaminophen   Tablet .. 650 milliGRAM(s) Oral every 6 hours PRN Mild Pain (1 - 3)  hydrALAZINE Injectable 10 milliGRAM(s) IV Push every 6 hours PRN For sbp>140  morphine  - Injectable 2 milliGRAM(s) IV Push every 6 hours PRN Severe Pain (7 - 10)  ondansetron Injectable 4 milliGRAM(s) IV Push every 8 hours PRN Nausea and/or Vomiting  polyethylene glycol 3350 17 Gram(s) Oral daily PRN Constipation    Allergies  No Known Allergies    Intolerances    SOCIAL HISTORY:  Nonsmoker.  No ETOH or illicit drug use    FAMILY HISTORY:  FH: MI (myocardial infarction) (Child)    Vital Signs Last 24 Hrs  T(C): 36.8 (25 May 2021 13:40), Max: 37.1 (25 May 2021 04:20)  T(F): 98.3 (25 May 2021 13:40), Max: 98.7 (25 May 2021 04:20)  HR: 59 (25 May 2021 13:40) (57 - 65)  BP: 163/74 (25 May 2021 13:40) (92/57 - 166/74)  BP(mean): --  RR: 17 (25 May 2021 13:40) (17 - 18)  SpO2: 94% (25 May 2021 13:40) (94% - 98%)    PHYSICAL EXAM  GENERAL:  Well-nourished, well-developed Female lying comfortably in bed in NAD  HEENT:  Sclera white. Mucous membranes moist.  ABDOMEN:  Soft, nontender, mildly distended    LABS:                        11.6   10.10 )-----------( 402      ( 24 May 2021 15:29 )             35.3     05-24    136  |  102  |  36<H>  ----------------------------<  192<H>  5.0   |  27  |  1.80<H>    Ca    8.5      24 May 2021 15:29    TPro  7.0  /  Alb  3.2<L>  /  TBili  1.3<H>  /  DBili  x   /  AST  107<H>  /  ALT  97<H>  /  AlkPhos  180<H>  05-24    PT/INR - ( 24 May 2021 15:29 )   PT: 13.6 sec;   INR: 1.17 ratio      PTT - ( 24 May 2021 15:29 )  PTT:26.6 sec    RADIOLOGY & ADDITIONAL STUDIES:  < from: MR MRCP No Cont (05.25.21 @ 10:32) >  FINDINGS:  LOWER CHEST: Trace left pleural effusion.    LIVER: Within normal limits.  BILE DUCTS: The common bile duct is dilated measuring 1.4 cm in diameter with gradual tapering distally. No evidence for choledocholithiasis.  GALLBLADDER: Gallstones in the gallbladder. No evidence for thickened gallbladder wall. Small pericholecystic fluid. If there is a clinical suspicion for acute cholecystitis, HIDA scan may be pursued for further evaluation.  SPLEEN: Within normal limits.  PANCREAS: No pancreatic mass is identified. Pancreas divisum.  ADRENALS: Within normal limits.  KIDNEYS/URETERS: Small brightly T2 hyperintense lesions in the kidneys bilaterally, representing probable cysts.    VISUALIZED PORTIONS:  BOWEL: Within normal limits.  PERITONEUM: Small ascites.  VESSELS: Within normal limits.  RETROPERITONEUM/LYMPH NODES: No lymphadenopathy.  ABDOMINAL WALL: Within normal limits.  BONES: Degenerative spondylosis.    IMPRESSION:  Dilated common bile duct measuring 1.4 cm in diameter with gradual tapering distally. No evidence for choledocholithiasis.    Pancreas divisum.    Gallstones in the gallbladder. No evidence for thickened gallbladder wall. Small pericholecystic fluid. If there is a clinical suspicion for acute cholecystitis, HIDA scan may be pursued for further evaluation.    Small ascites.    Trace left pleural effusion.    < from: CT Abdomen and Pelvis No Cont (05.24.21 @ 17:33) >    FINDINGS:  CHEST:  LUNGS AND LARGE AIRWAYS: Patent central airways. No pulmonary nodules.  PLEURA: No pleural effusion.  VESSELS: Within normal limits.  HEART: Heart size is normal.  No pericardial effusion.  MEDIASTINUM AND RANDY: No lymphadenopathy.  CHEST WALL AND LOWER NECK: Within normal limits.    ABDOMEN AND PELVIS:  LIVER: Within normal limits.  BILE DUCTS: Dilated common bile duct measuring 1.4 cm.  GALLBLADDER: Cholelithiasis.  SPLEEN: Within normal limits.  PANCREAS: Within normal limits.  ADRENALS: Within normal limits.  KIDNEYS/URETERS: Within normal limits.    BLADDER: Within normal limits.  REPRODUCTIVE ORGANS: Within normal limits.    BOWEL: No bowel obstruction. Rectum mildly distended by stool.  PERITONEUM: No ascites.  VESSELS: Within normal limits.  RETROPERITONEUM/LYMPH NODES: No lymphadenopathy.  ABDOMINAL WALL: Within normal limits.  BONES: Chronic appearing left anterior rib fractures.    IMPRESSION: Cholelithiasis. Dilated common bile duct.

## 2021-05-25 NOTE — CONSULT NOTE ADULT - ASSESSMENT
dilated cbd  elevated lfts  fever    advance to clears  cont iv antibiotics  f/u cultures  await MRCP  plavix on hold  if mrcp positive may need ercp  will follow

## 2021-05-25 NOTE — PROGRESS NOTE ADULT - SUBJECTIVE AND OBJECTIVE BOX
Patient is a 89y old  Female who presents with a chief complaint of sepsis (24 May 2021 20:10)       INTERVAL HPI/OVERNIGHT EVENTS: Patient seen and examined at bedside. C/o nausea. Denies chest pain, palpitation, sob.     MEDICATIONS  (STANDING):  aspirin enteric coated 81 milliGRAM(s) Oral daily  dextrose 40% Gel 15 Gram(s) Oral once  dextrose 5%. 1000 milliLiter(s) (50 mL/Hr) IV Continuous <Continuous>  dextrose 5%. 1000 milliLiter(s) (100 mL/Hr) IV Continuous <Continuous>  dextrose 50% Injectable 25 Gram(s) IV Push once  dextrose 50% Injectable 12.5 Gram(s) IV Push once  dextrose 50% Injectable 25 Gram(s) IV Push once  glucagon  Injectable 1 milliGRAM(s) IntraMuscular once  heparin   Injectable 5000 Unit(s) SubCutaneous every 12 hours  insulin lispro (ADMELOG) corrective regimen sliding scale   SubCutaneous every 6 hours  melatonin 5 milliGRAM(s) Oral at bedtime  pantoprazole    Tablet 40 milliGRAM(s) Oral before breakfast  piperacillin/tazobactam IVPB.. 3.375 Gram(s) IV Intermittent every 8 hours  senna 2 Tablet(s) Oral at bedtime  sodium chloride 0.9%. 1000 milliLiter(s) (75 mL/Hr) IV Continuous <Continuous>  sucralfate 1 Gram(s) Oral every 12 hours    MEDICATIONS  (PRN):  acetaminophen   Tablet .. 650 milliGRAM(s) Oral every 6 hours PRN Mild Pain (1 - 3)  oxyCODONE    IR 5 milliGRAM(s) Oral every 4 hours PRN Moderate Pain (4 - 6)  oxyCODONE    IR 10 milliGRAM(s) Oral every 4 hours PRN Severe Pain (7 - 10)  polyethylene glycol 3350 17 Gram(s) Oral daily PRN Constipation      Allergies    No Known Allergies    Intolerances        REVIEW OF SYSTEMS:  CONSTITUTIONAL: No fever,  or fatigue  EYES: No eye pain, visual disturbances, or discharge  ENMT:  No difficulty hearing, tinnitus, vertigo; No sinus or throat pain  NECK: No pain or stiffness  RESPIRATORY: No cough, wheezing, chills or hemoptysis; No shortness of breath  CARDIOVASCULAR: No chest pain, palpitations, dizziness, or leg swelling  GASTROINTESTINAL: + abdominal pain. + nausea, No vomiting, or hematemesis; No diarrhea or constipation.   GENITOURINARY: No dysuria, frequency, hematuria, or incontinence  NEUROLOGICAL: No headaches, loss of strength, numbness, or tremors  SKIN: No itching, burning, rashes, or lesions   LYMPH NODES: No enlarged glands  MUSCULOSKELETAL: No joint pain or swelling; No muscle, back, or extremity pain  HEME/LYMPH: No easy bruising, or bleeding gums  ALLERGY AND IMMUNOLOGIC: No hives or eczema    Vital Signs Last 24 Hrs  T(C): 37.1 (25 May 2021 04:20), Max: 37.1 (25 May 2021 04:20)  T(F): 98.7 (25 May 2021 04:20), Max: 98.7 (25 May 2021 04:20)  HR: 65 (25 May 2021 04:20) (54 - 65)  BP: 166/74 (25 May 2021 04:20) (66/47 - 166/74)  BP(mean): --  RR: 18 (25 May 2021 04:20) (17 - 19)  SpO2: 94% (25 May 2021 04:20) (94% - 98%)    PHYSICAL EXAM:  GENERAL: NAD, Awake, Alert   HEAD:  Atraumatic, Normocephalic  EYES: EOMI, PERRLA, conjunctiva and sclera clear  ENMT: No tonsillar erythema, exudates, or enlargement; Moist mucous membranes  NECK: Supple, No JVD, Normal thyroid  NERVOUS SYSTEM:  Alert & Awake,  Motor Strength 5/5 B/L upper and lower extremities  CHEST/LUNG: Clear to auscultation bilaterally; No rales, rhonchi, wheezing  HEART: S1S2+, Regular rate and rhythm  ABDOMEN: Soft, Nondistended; Bowel sounds present  EXTREMITIES:  2+ Peripheral Pulses, No clubbing, cyanosis  LYMPH: No lymphadenopathy noted  SKIN: No rashes or lesions    LABS:                        11.6   10.10 )-----------( 402      ( 24 May 2021 15:29 )             35.3     24 May 2021 15:29    136    |  102    |  36     ----------------------------<  192    5.0     |  27     |  1.80     Ca    8.5        24 May 2021 15:29    TPro  7.0    /  Alb  3.2    /  TBili  1.3    /  DBili  x      /  AST  107    /  ALT  97     /  AlkPhos  180    24 May 2021 15:29    PT/INR - ( 24 May 2021 15:29 )   PT: 13.6 sec;   INR: 1.17 ratio         PTT - ( 24 May 2021 15:29 )  PTT:26.6 sec  CAPILLARY BLOOD GLUCOSE      POCT Blood Glucose.: 102 mg/dL (25 May 2021 06:07)  POCT Blood Glucose.: 106 mg/dL (25 May 2021 00:10)    BLOOD CULTURE    RADIOLOGY & ADDITIONAL TESTS:    Imaging Personally Reviewed:  [ ] YES     Consultant(s) Notes Reviewed:      Care Discussed with Consultants/Other Providers:

## 2021-05-25 NOTE — PROGRESS NOTE ADULT - TIME BILLING
Patient seen and examined at bedside. Chart, meds, labs, vital reviewed. Spoke to patient in her language, Janelle/benigno, she understood. Patient seen and examined at bedside. Chart, meds, labs, vital reviewed. Spoke to patient in her language, Janelle/ benigno, she understood.

## 2021-05-25 NOTE — PROGRESS NOTE ADULT - ASSESSMENT
88 yo F PMHx T2DM, HTN, HLD, GERD, CAD (s/p LAD 2019 with stents placed on DAPT) admitted for sepsis likely due to intra-abdominal pathology, possible cholangitis given CBD dilation and elevated Tbili and LFTs.

## 2021-05-25 NOTE — PHYSICAL THERAPY INITIAL EVALUATION ADULT - PERTINENT HX OF CURRENT PROBLEM, REHAB EVAL
Pt fell on 5/12 at the grocery store and sustained an acute spra / intracondular fx of her left distal humerus with left acromial arthroplasty Pt fell on 5/12 at the grocery store and sustained an acute spra / intracondular fx of her left distal humerus with left acromial arthroplasty.

## 2021-05-25 NOTE — PHYSICAL THERAPY INITIAL EVALUATION ADULT - RANGE OF MOTION EXAMINATION, REHAB EVAL
left upper extremity immobilized in a sling/Right UE ROM was WNL (within normal limits)/Right LE ROM was WFL (within functional limits)/bilateral lower extremity ROM was WFL (within functional limits)/deficits as listed below

## 2021-05-25 NOTE — CONSULT NOTE ADULT - ASSESSMENT
88 yo F PMHx T2DM, HTN, HLD, GERD, CAD (s/p LAD/D1 stents, placed on DAPT) admitted for sepsis likely due to intra-abdominal pathology, possible cholangitis given CBD dilation and elevated Tbili and LFTs. Cardiology consulted for medical clearance for possible ERCP/MRCP.    #CAD s/p 2 stents placed in 2015 of the LAD/D1, on DAPT currently   - Patient is not complaining of any cardiac symptoms at this time.  - EKG: sinus bradycardia HR: 53, no signs of acute ischemia, no changes compared to previous EKG (EKG in 2019 was sinus bradycardia HR: 54)  - No clear evidence of acute ischemia, trops negative x 1.   - No meaningful evidence of volume overload.  - No previous echo on record.   - Recent cath in 2019 with stable angina with normal EF 65%      #HTN  - BP now elevated given that BP meds were held due to hypotension on admission   - May result home BP meds with hold parameters   - Monitor routine hemodynamics    #HLD  - Continue home statin      Cardiac Clearance for MRCP/ERCP:  - Pt has no evidence of ischemia, decompensated heart failure, unstable arrythmia, or severe stenotic valvular disease.  - Able to walk >4 METS without any anginal symptoms.   - RCRI score 1-Class II Risk   - In the setting of low risk procedure, patient is optimized from cardiovascular standpoint to proceed with planned procedure with routine hemodynamic monitoring.       - Monitor and replete lytes, keep K>4, Mg>2  - Other cardiovascular workup will depend on clinical course.  - All other workup per primary team  - Will continue to follow                 88 yo F PMHx T2DM, HTN, HLD, GERD, CAD (s/p LAD/D1 stents, placed on DAPT) admitted for sepsis likely due to intra-abdominal pathology, possible cholangitis given CBD dilation and elevated Tbili and LFTs. Cardiology consulted for medical clearance for possible ERCP.    #CAD s/p 2 stents placed in 2015 of the LAD/D1, on DAPT   - Patient is not complaining of any cardiac symptoms at this time.  - EKG: sinus bradycardia HR: 53, no signs of acute ischemia, no changes compared to previous EKG (EKG in 2019 was sinus bradycardia HR: 54)  - No clear evidence of acute ischemia, trops negative x 1.   - No meaningful evidence of volume overload.  - No previous echo on record.   - Recent cath in 2019-Nonobstructive CAD, patent stents, EF: 65%   - Discontinue Plavix, may continue Aspirin as patient no longer needs dual antiplatelet therapy     #HTN  - BP now elevated given that BP meds were held due to hypotension on admission and pt currently being on fluids    - May restart BP medications as needed   - Monitor routine hemodynamics    #HLD  - Continue home statin      Cardiac Clearance for ERCP:  - Pt has no evidence of ischemia, decompensated heart failure, unstable arrythmia, or severe stenotic valvular disease.  - Able to walk >4 METS without any anginal symptoms.   - RCRI score 1-Class II Risk   - In the setting of low risk procedure, patient is optimized from cardiovascular standpoint to proceed with planned procedure with routine hemodynamic monitoring.       - Monitor and replete lytes, keep K>4, Mg>2  - Other cardiovascular workup will depend on clinical course.  - All other workup per primary team  - Will continue to follow                 89 yo F PMHx T2DM, HTN, HLD, GERD, CAD (s/p LAD/D1 stents, placed on DAPT) admitted for sepsis likely due to intra-abdominal pathology, possible cholangitis given CBD dilation and elevated Tbili and LFTs. Cardiology consulted for medical clearance for possible ERCP.    #CAD s/p 2 stents placed in 2015 of the LAD/D1, on DAPT   - Patient is not complaining of any cardiac symptoms at this time.  - EKG: sinus bradycardia HR: 53, no signs of acute ischemia, no changes compared to previous EKG (EKG in 2019 was sinus bradycardia HR: 54)  - No clear evidence of acute ischemia, trops negative x 1.   - No meaningful evidence of volume overload.  - No previous echo on record.   - Recent cath in 2019-Nonobstructive CAD, patent stents, EF: 65%   - Discontinue Plavix, continue Aspirin as patient no longer needs dual antiplatelet therapy.      #HTN  - BP now elevated given that BP meds were held due to hypotension on admission and pt currently being on fluids    - would restart BP medications as needed, and start with atenolol so as to avoid withdrawal tachycardia   - Monitor routine hemodynamics    #HLD  - Continue home statin      Cardiac Clearance for ERCP:  - Pt has no evidence of ischemia, decompensated heart failure, unstable arrythmia, or severe stenotic valvular disease.  - Able to walk >4 METS without any anginal symptoms.   - RCRI score 1-Class II Risk   - In the setting of low risk procedure, patient is optimized from cardiovascular standpoint to proceed with planned procedure with routine hemodynamic monitoring.       - Monitor and replete lytes, keep K>4, Mg>2  - Other cardiovascular workup will depend on clinical course.  - All other workup per primary team  - Will continue to follow

## 2021-05-26 DIAGNOSIS — K80.20 CALCULUS OF GALLBLADDER WITHOUT CHOLECYSTITIS WITHOUT OBSTRUCTION: ICD-10-CM

## 2021-05-26 LAB
ALBUMIN SERPL ELPH-MCNC: 2.7 G/DL — LOW (ref 3.3–5)
ALP SERPL-CCNC: 159 U/L — HIGH (ref 40–120)
ALT FLD-CCNC: 44 U/L — SIGNIFICANT CHANGE UP (ref 12–78)
ANION GAP SERPL CALC-SCNC: 8 MMOL/L — SIGNIFICANT CHANGE UP (ref 5–17)
AST SERPL-CCNC: 39 U/L — HIGH (ref 15–37)
BASOPHILS # BLD AUTO: 0.03 K/UL — SIGNIFICANT CHANGE UP (ref 0–0.2)
BASOPHILS NFR BLD AUTO: 0.5 % — SIGNIFICANT CHANGE UP (ref 0–2)
BILIRUB SERPL-MCNC: 0.8 MG/DL — SIGNIFICANT CHANGE UP (ref 0.2–1.2)
BUN SERPL-MCNC: 13 MG/DL — SIGNIFICANT CHANGE UP (ref 7–23)
CALCIUM SERPL-MCNC: 8.4 MG/DL — LOW (ref 8.5–10.1)
CHLORIDE SERPL-SCNC: 105 MMOL/L — SIGNIFICANT CHANGE UP (ref 96–108)
CO2 SERPL-SCNC: 26 MMOL/L — SIGNIFICANT CHANGE UP (ref 22–31)
CREAT SERPL-MCNC: 0.69 MG/DL — SIGNIFICANT CHANGE UP (ref 0.5–1.3)
CULTURE RESULTS: NO GROWTH — SIGNIFICANT CHANGE UP
EOSINOPHIL # BLD AUTO: 0.12 K/UL — SIGNIFICANT CHANGE UP (ref 0–0.5)
EOSINOPHIL NFR BLD AUTO: 2.2 % — SIGNIFICANT CHANGE UP (ref 0–6)
GLUCOSE SERPL-MCNC: 99 MG/DL — SIGNIFICANT CHANGE UP (ref 70–99)
HCT VFR BLD CALC: 32.4 % — LOW (ref 34.5–45)
HGB BLD-MCNC: 10.6 G/DL — LOW (ref 11.5–15.5)
IMM GRANULOCYTES NFR BLD AUTO: 1.3 % — SIGNIFICANT CHANGE UP (ref 0–1.5)
LYMPHOCYTES # BLD AUTO: 0.95 K/UL — LOW (ref 1–3.3)
LYMPHOCYTES # BLD AUTO: 17 % — SIGNIFICANT CHANGE UP (ref 13–44)
MCHC RBC-ENTMCNC: 30.7 PG — SIGNIFICANT CHANGE UP (ref 27–34)
MCHC RBC-ENTMCNC: 32.7 GM/DL — SIGNIFICANT CHANGE UP (ref 32–36)
MCV RBC AUTO: 93.9 FL — SIGNIFICANT CHANGE UP (ref 80–100)
MONOCYTES # BLD AUTO: 0.75 K/UL — SIGNIFICANT CHANGE UP (ref 0–0.9)
MONOCYTES NFR BLD AUTO: 13.4 % — SIGNIFICANT CHANGE UP (ref 2–14)
NEUTROPHILS # BLD AUTO: 3.66 K/UL — SIGNIFICANT CHANGE UP (ref 1.8–7.4)
NEUTROPHILS NFR BLD AUTO: 65.6 % — SIGNIFICANT CHANGE UP (ref 43–77)
NRBC # BLD: 0 /100 WBCS — SIGNIFICANT CHANGE UP (ref 0–0)
PLATELET # BLD AUTO: 308 K/UL — SIGNIFICANT CHANGE UP (ref 150–400)
POTASSIUM SERPL-MCNC: 3.7 MMOL/L — SIGNIFICANT CHANGE UP (ref 3.5–5.3)
POTASSIUM SERPL-SCNC: 3.7 MMOL/L — SIGNIFICANT CHANGE UP (ref 3.5–5.3)
PROT SERPL-MCNC: 6.3 G/DL — SIGNIFICANT CHANGE UP (ref 6–8.3)
RBC # BLD: 3.45 M/UL — LOW (ref 3.8–5.2)
RBC # FLD: 12.4 % — SIGNIFICANT CHANGE UP (ref 10.3–14.5)
SODIUM SERPL-SCNC: 139 MMOL/L — SIGNIFICANT CHANGE UP (ref 135–145)
SPECIMEN SOURCE: SIGNIFICANT CHANGE UP
WBC # BLD: 5.58 K/UL — SIGNIFICANT CHANGE UP (ref 3.8–10.5)
WBC # FLD AUTO: 5.58 K/UL — SIGNIFICANT CHANGE UP (ref 3.8–10.5)

## 2021-05-26 PROCEDURE — 99232 SBSQ HOSP IP/OBS MODERATE 35: CPT

## 2021-05-26 PROCEDURE — 78226 HEPATOBILIARY SYSTEM IMAGING: CPT | Mod: 26

## 2021-05-26 PROCEDURE — 99233 SBSQ HOSP IP/OBS HIGH 50: CPT

## 2021-05-26 RX ORDER — INSULIN LISPRO 100/ML
VIAL (ML) SUBCUTANEOUS
Refills: 0 | Status: DISCONTINUED | OUTPATIENT
Start: 2021-05-26 | End: 2021-05-30

## 2021-05-26 RX ORDER — MEROPENEM 1 G/30ML
1000 INJECTION INTRAVENOUS EVERY 8 HOURS
Refills: 0 | Status: DISCONTINUED | OUTPATIENT
Start: 2021-05-26 | End: 2021-06-01

## 2021-05-26 RX ORDER — MORPHINE SULFATE 50 MG/1
2 CAPSULE, EXTENDED RELEASE ORAL ONCE
Refills: 0 | Status: DISCONTINUED | OUTPATIENT
Start: 2021-05-26 | End: 2021-05-26

## 2021-05-26 RX ORDER — INSULIN LISPRO 100/ML
VIAL (ML) SUBCUTANEOUS AT BEDTIME
Refills: 0 | Status: DISCONTINUED | OUTPATIENT
Start: 2021-05-26 | End: 2021-05-30

## 2021-05-26 RX ADMIN — Medication 1 GRAM(S): at 05:24

## 2021-05-26 RX ADMIN — Medication 5 MILLIGRAM(S): at 21:47

## 2021-05-26 RX ADMIN — MEROPENEM 100 MILLIGRAM(S): 1 INJECTION INTRAVENOUS at 15:39

## 2021-05-26 RX ADMIN — Medication 1 GRAM(S): at 17:16

## 2021-05-26 RX ADMIN — Medication 81 MILLIGRAM(S): at 15:38

## 2021-05-26 RX ADMIN — HEPARIN SODIUM 5000 UNIT(S): 5000 INJECTION INTRAVENOUS; SUBCUTANEOUS at 17:16

## 2021-05-26 RX ADMIN — HEPARIN SODIUM 5000 UNIT(S): 5000 INJECTION INTRAVENOUS; SUBCUTANEOUS at 05:30

## 2021-05-26 RX ADMIN — MORPHINE SULFATE 2 MILLIGRAM(S): 50 CAPSULE, EXTENDED RELEASE ORAL at 13:46

## 2021-05-26 RX ADMIN — MEROPENEM 100 MILLIGRAM(S): 1 INJECTION INTRAVENOUS at 21:47

## 2021-05-26 RX ADMIN — Medication 10 MILLIGRAM(S): at 15:39

## 2021-05-26 RX ADMIN — MEROPENEM 100 MILLIGRAM(S): 1 INJECTION INTRAVENOUS at 05:24

## 2021-05-26 RX ADMIN — SENNA PLUS 2 TABLET(S): 8.6 TABLET ORAL at 21:47

## 2021-05-26 RX ADMIN — MORPHINE SULFATE 2 MILLIGRAM(S): 50 CAPSULE, EXTENDED RELEASE ORAL at 12:17

## 2021-05-26 RX ADMIN — ONDANSETRON 4 MILLIGRAM(S): 8 TABLET, FILM COATED ORAL at 19:56

## 2021-05-26 RX ADMIN — PANTOPRAZOLE SODIUM 40 MILLIGRAM(S): 20 TABLET, DELAYED RELEASE ORAL at 05:24

## 2021-05-26 NOTE — PROGRESS NOTE ADULT - SUBJECTIVE AND OBJECTIVE BOX
[facility] DIVISION of INFECTIOUS DISEASE  Cheko Gallo MD PhD, Genesis Hare MD, Brianda Alexander MD, Berkley Salgado MD  and providing coverage with Bety Galeas MD and Toby Vang MD  Providing Infectious Disease Consultations at [facility], [facility], [facility]'s    Office# [phone] to schedule follow up appointments  Answering Service for urgent calls or New Consults [phone]  Cell# to text for urgent issues Cheko Gallo [phone]     infectious diseases progress note:    KADY SUAREZ is a 89y y. o. Female patient    No concerning overnight events    Allergies    No Known Allergies    Intolerances        ANTIBIOTICS/RELEVANT:  antimicrobials  meropenem  IVPB 500 milliGRAM(s) IV Intermittent every 12 hours    immunologic:    OTHER:  acetaminophen   Tablet .. 650 milliGRAM(s) Oral every 6 hours PRN  aspirin enteric coated 81 milliGRAM(s) Oral daily  dextrose 40% Gel 15 Gram(s) Oral once  dextrose 5%. 1000 milliLiter(s) IV Continuous <Continuous>  dextrose 5%. 1000 milliLiter(s) IV Continuous <Continuous>  dextrose 50% Injectable 25 Gram(s) IV Push once  dextrose 50% Injectable 12.5 Gram(s) IV Push once  dextrose 50% Injectable 25 Gram(s) IV Push once  glucagon  Injectable 1 milliGRAM(s) IntraMuscular once  heparin   Injectable 5000 Unit(s) SubCutaneous every 12 hours  hydrALAZINE Injectable 10 milliGRAM(s) IV Push every 6 hours PRN  insulin lispro (ADMELOG) corrective regimen sliding scale   SubCutaneous every 6 hours  melatonin 5 milliGRAM(s) Oral at bedtime  morphine  - Injectable 2 milliGRAM(s) IV Push every 6 hours PRN  ondansetron Injectable 4 milliGRAM(s) IV Push every 8 hours PRN  pantoprazole    Tablet 40 milliGRAM(s) Oral before breakfast  polyethylene glycol 3350 17 Gram(s) Oral daily PRN  senna 2 Tablet(s) Oral at bedtime  sucralfate 1 Gram(s) Oral every 12 hours      Objective:  Vital Signs Last 24 Hrs  T(C): 36.6 (26 May 2021 08:10), Max: 36.8 (25 May 2021 13:40)  T(F): 97.8 (26 May 2021 08:10), Max: 98.3 (25 May 2021 13:40)  HR: 66 (26 May 2021 08:10) (59 - 66)  BP: 166/72 (26 May 2021 08:10) (160/72 - 189/79)  BP(mean): --  RR: 18 (26 May 2021 08:10) (17 - 18)  SpO2: 97% (26 May 2021 08:10) (94% - 97%)    T(C): 36.6 (21 @ 08:10), Max: 37.1 (21 @ 04:20)  T(C): 36.6 (21 @ 08:10), Max: 37.1 (21 @ 04:20)  T(C): 36.6 (21 @ 08:10), Max: 37.1 (21 @ 04:20)        LABS:                          10.6   5.58  )-----------( 308      ( 26 May 2021 06:55 )             32.4       5.58  @ 06:55  10.10  @ 15:29          139  |  105  |  13  ----------------------------<  99  3.7   |  26  |  0.69    Ca    8.4<L>      26 May 2021 06:55    TPro  6.3  /  Alb  2.7<L>  /  TBili  0.8  /  DBili  x   /  AST  39<H>  /  ALT  44  /  AlkPhos  159<H>        Creatinine, Serum: 0.69 mg/dL (21 @ 06:55)  Creatinine, Serum: 1.80 mg/dL (21 @ 15:29)      PT/INR - ( 24 May 2021 15:29 )   PT: 13.6 sec;   INR: 1.17 ratio         PTT - ( 24 May 2021 15:29 )  PTT:26.6 sec  Urinalysis Basic - ( 25 May 2021 20:55 )    Color: Yellow / Appearance: Clear / S.010 / pH: x  Gluc: x / Ketone: Negative  / Bili: Negative / Urobili: Negative   Blood: x / Protein: Negative / Nitrite: Negative   Leuk Esterase: Negative / RBC: x / WBC x   Sq Epi: x / Non Sq Epi: x / Bacteria: x            INFLAMMATORY MARKERS  Auto Neutrophil #: 3.66 K/uL (21 @ 06:55)  Auto Lymphocyte #: 0.95 K/uL (21 @ 06:55)  Auto Neutrophil #: 8.30 K/uL (21 @ 15:29)  Auto Lymphocyte #: 0.68 K/uL (21 @ 15:29)    Lactate, Blood: 1.1 mmol/L (21 @ 21:02)  Lactate, Blood: 2.5 mmol/L (21 @ 15:29)    Auto Eosinophil #: 0.12 K/uL (21 @ 06:55)  Auto Eosinophil #: 0.02 K/uL (21 @ 15:29)          Troponin I, Serum: .034 ng/mL (21 @ 15:29)                Activated Partial Thromboplastin Time: 26.6 sec (21 @ 15:29)  INR: 1.17 ratio (21 @ 15:29)          MICROBIOLOGY:              RADIOLOGY & ADDITIONAL STUDIES:

## 2021-05-26 NOTE — PROGRESS NOTE ADULT - SUBJECTIVE AND OBJECTIVE BOX
Patient is a 89y old  Female who presents with a chief complaint of sepsis (24 May 2021 20:10)       INTERVAL HPI/OVERNIGHT EVENTS: Patient seen and examined at bedside. C/o nausea and pain lt UE. Denies abd pain, vomiting, palpitation, sob. Pt is NPO for HIDA.     MEDICATIONS  (STANDING):  aspirin enteric coated 81 milliGRAM(s) Oral daily  dextrose 40% Gel 15 Gram(s) Oral once  dextrose 5%. 1000 milliLiter(s) (50 mL/Hr) IV Continuous <Continuous>  dextrose 5%. 1000 milliLiter(s) (100 mL/Hr) IV Continuous <Continuous>  dextrose 50% Injectable 25 Gram(s) IV Push once  dextrose 50% Injectable 12.5 Gram(s) IV Push once  dextrose 50% Injectable 25 Gram(s) IV Push once  glucagon  Injectable 1 milliGRAM(s) IntraMuscular once  heparin   Injectable 5000 Unit(s) SubCutaneous every 12 hours  insulin lispro (ADMELOG) corrective regimen sliding scale   SubCutaneous every 6 hours  melatonin 5 milliGRAM(s) Oral at bedtime  meropenem  IVPB 1000 milliGRAM(s) IV Intermittent every 8 hours  morphine  - Injectable 2 milliGRAM(s) IV Push once  pantoprazole    Tablet 40 milliGRAM(s) Oral before breakfast  senna 2 Tablet(s) Oral at bedtime  sucralfate 1 Gram(s) Oral every 12 hours    MEDICATIONS  (PRN):  acetaminophen   Tablet .. 650 milliGRAM(s) Oral every 6 hours PRN Mild Pain (1 - 3)  hydrALAZINE Injectable 10 milliGRAM(s) IV Push every 6 hours PRN For sbp>140  morphine  - Injectable 2 milliGRAM(s) IV Push every 6 hours PRN Severe Pain (7 - 10)  ondansetron Injectable 4 milliGRAM(s) IV Push every 8 hours PRN Nausea and/or Vomiting  polyethylene glycol 3350 17 Gram(s) Oral daily PRN Constipation      Allergies    No Known Allergies    Intolerances      PHYSICAL EXAM:  General: Well developed, well nourished, NAD  HEENT: NCAT, PERRLA, EOMI bl, moist mucous membranes   Neck: Supple, nontender, no mass  Neurology: A&Ox3, nonfocal, sensation intact,   Respiratory: CTA B/L, No W/R/R  CV: RRR, +S1/S2, no murmurs, rubs or gallops  Abdominal: Soft, NT, ND +BSx4  Extremities: Lt UE with ace. + peripheral pulses  MSK: Normal ROM, no joint erythema or warmth, no joint swelling   Skin: warm, dry, normal color, no rash or abnormal lesions    Vital Signs Last 24 Hrs  T(C): 36.6 (26 May 2021 08:10), Max: 36.8 (25 May 2021 13:40)  T(F): 97.8 (26 May 2021 08:10), Max: 98.3 (25 May 2021 13:40)  HR: 66 (26 May 2021 08:10) (59 - 66)  BP: 166/72 (26 May 2021 08:10) (160/72 - 189/79)  BP(mean): --  RR: 18 (26 May 2021 08:10) (17 - 18)  SpO2: 97% (26 May 2021 08:10) (94% - 97%)        LABS:                        10.6   5.58  )-----------( 308      ( 26 May 2021 06:55 )             32.4     26 May 2021 06:55    139    |  105    |  13     ----------------------------<  99     3.7     |  26     |  0.69     Ca    8.4        26 May 2021 06:55    TPro  6.3    /  Alb  2.7    /  TBili  0.8    /  DBili  x      /  AST  39     /  ALT  44     /  AlkPhos  159    26 May 2021 06:55    PT/INR - ( 24 May 2021 15:29 )   PT: 13.6 sec;   INR: 1.17 ratio         PTT - ( 24 May 2021 15:29 )  PTT:26.6 sec  Urinalysis Basic - ( 25 May 2021 20:55 )    Color: Yellow / Appearance: Clear / S.010 / pH: x  Gluc: x / Ketone: Negative  / Bili: Negative / Urobili: Negative   Blood: x / Protein: Negative / Nitrite: Negative   Leuk Esterase: Negative / RBC: x / WBC x   Sq Epi: x / Non Sq Epi: x / Bacteria: x      CAPILLARY BLOOD GLUCOSE      POCT Blood Glucose.: 109 mg/dL (26 May 2021 05:27)  POCT Blood Glucose.: 94 mg/dL (25 May 2021 22:09)  POCT Blood Glucose.: 131 mg/dL (25 May 2021 16:50)    UCx       RADIOLOGY & ADDITIONAL TESTS:      Imaging Personally Reviewed:  [x ] YES     Consultant(s) Notes Reviewed:  yes    Care Discussed with Consultants/Other Providers: yes

## 2021-05-26 NOTE — PROGRESS NOTE ADULT - SUBJECTIVE AND OBJECTIVE BOX
Surgery Progress Note     Subjective: no abdominal pain, no n/v    PE: and soft obese nontender     Vital Signs Last 24 Hrs  T(C): 36.8 (26 May 2021 05:20), Max: 36.8 (25 May 2021 13:40)  T(F): 98.3 (26 May 2021 05:20), Max: 98.3 (25 May 2021 13:40)  HR: 63 (26 May 2021 05:20) (59 - 65)  BP: 172/70 (26 May 2021 05:20) (160/72 - 189/79)  BP(mean): --  RR: 18 (26 May 2021 05:20) (17 - 18)  SpO2: 96% (26 May 2021 05:20) (94% - 96%)    I&O's Detail    25 May 2021 07:01  -  26 May 2021 07:00  --------------------------------------------------------  IN:    dextrose 5% + sodium chloride 0.9%: 600 mL    IV PiggyBack: 300 mL    Oral Fluid: 600 mL  Total IN: 1500 mL    OUT:    Voided (mL): 950 mL  Total OUT: 950 mL    Total NET: 550 mL          Daily     Daily Weight in k.4 (26 May 2021 05:20)    MEDICATIONS  (STANDING):  aspirin enteric coated 81 milliGRAM(s) Oral daily  dextrose 40% Gel 15 Gram(s) Oral once  dextrose 5%. 1000 milliLiter(s) (50 mL/Hr) IV Continuous <Continuous>  dextrose 5%. 1000 milliLiter(s) (100 mL/Hr) IV Continuous <Continuous>  dextrose 50% Injectable 25 Gram(s) IV Push once  dextrose 50% Injectable 12.5 Gram(s) IV Push once  dextrose 50% Injectable 25 Gram(s) IV Push once  glucagon  Injectable 1 milliGRAM(s) IntraMuscular once  heparin   Injectable 5000 Unit(s) SubCutaneous every 12 hours  insulin lispro (ADMELOG) corrective regimen sliding scale   SubCutaneous every 6 hours  melatonin 5 milliGRAM(s) Oral at bedtime  meropenem  IVPB 500 milliGRAM(s) IV Intermittent every 12 hours  pantoprazole    Tablet 40 milliGRAM(s) Oral before breakfast  senna 2 Tablet(s) Oral at bedtime  sucralfate 1 Gram(s) Oral every 12 hours    MEDICATIONS  (PRN):  acetaminophen   Tablet .. 650 milliGRAM(s) Oral every 6 hours PRN Mild Pain (1 - 3)  hydrALAZINE Injectable 10 milliGRAM(s) IV Push every 6 hours PRN For sbp>140  morphine  - Injectable 2 milliGRAM(s) IV Push every 6 hours PRN Severe Pain (7 - 10)  ondansetron Injectable 4 milliGRAM(s) IV Push every 8 hours PRN Nausea and/or Vomiting  polyethylene glycol 3350 17 Gram(s) Oral daily PRN Constipation      LABS:                        10.6   5.58  )-----------( 308      ( 26 May 2021 06:55 )             32.4         139  |  105  |  13  ----------------------------<  99  3.7   |  26  |  0.69    Ca    8.4<L>      26 May 2021 06:55    TPro  6.3  /  Alb  2.7<L>  /  TBili  0.8  /  DBili  x   /  AST  39<H>  /  ALT  44  /  AlkPhos  159<H>      PT/INR - ( 24 May 2021 15:29 )   PT: 13.6 sec;   INR: 1.17 ratio         PTT - ( 24 May 2021 15:29 )  PTT:26.6 sec  Urinalysis Basic - ( 25 May 2021 20:55 )    Color: Yellow / Appearance: Clear / S.010 / pH: x  Gluc: x / Ketone: Negative  / Bili: Negative / Urobili: Negative   Blood: x / Protein: Negative / Nitrite: Negative   Leuk Esterase: Negative / RBC: x / WBC x   Sq Epi: x / Non Sq Epi: x / Bacteria: x

## 2021-05-26 NOTE — PROGRESS NOTE ADULT - SUBJECTIVE AND OBJECTIVE BOX
[facility] -- Marsha Agarwal, Arleth, Vanessa, Damian Lemus Savella  Office # [phone]      Follow Up:  Hx of CAD stents with CBD stone for Cardiac clearance    Subjective/Observations: Seen and examined.  Sitting in bed with reports of left arm discomfort.  No events overnight.  No report of cp, sob or palpitations.  Tele reviewed.  NAD.       REVIEW OF SYSTEMS: All other review of systems is negative unless indicated above    PAST MEDICAL & SURGICAL HISTORY:  Diabetes mellitus    CAD (coronary artery disease)  2 cardiac stents placed    Hypertension    Hyperlipidemia    Bilateral cataracts        MEDICATIONS  (STANDING):  aspirin enteric coated 81 milliGRAM(s) Oral daily  dextrose 40% Gel 15 Gram(s) Oral once  dextrose 5%. 1000 milliLiter(s) (50 mL/Hr) IV Continuous <Continuous>  dextrose 5%. 1000 milliLiter(s) (100 mL/Hr) IV Continuous <Continuous>  dextrose 50% Injectable 25 Gram(s) IV Push once  dextrose 50% Injectable 12.5 Gram(s) IV Push once  dextrose 50% Injectable 25 Gram(s) IV Push once  glucagon  Injectable 1 milliGRAM(s) IntraMuscular once  heparin   Injectable 5000 Unit(s) SubCutaneous every 12 hours  insulin lispro (ADMELOG) corrective regimen sliding scale   SubCutaneous every 6 hours  melatonin 5 milliGRAM(s) Oral at bedtime  meropenem  IVPB 500 milliGRAM(s) IV Intermittent every 12 hours  pantoprazole    Tablet 40 milliGRAM(s) Oral before breakfast  senna 2 Tablet(s) Oral at bedtime  sucralfate 1 Gram(s) Oral every 12 hours    MEDICATIONS  (PRN):  acetaminophen   Tablet .. 650 milliGRAM(s) Oral every 6 hours PRN Mild Pain (1 - 3)  hydrALAZINE Injectable 10 milliGRAM(s) IV Push every 6 hours PRN For sbp>140  morphine  - Injectable 2 milliGRAM(s) IV Push every 6 hours PRN Severe Pain (7 - 10)  ondansetron Injectable 4 milliGRAM(s) IV Push every 8 hours PRN Nausea and/or Vomiting  polyethylene glycol 3350 17 Gram(s) Oral daily PRN Constipation      Allergies    No Known Allergies    Intolerances            Vital Signs Last 24 Hrs  T(C): 36.8 (26 May 2021 05:20), Max: 36.8 (25 May 2021 13:40)  T(F): 98.3 (26 May 2021 05:20), Max: 98.3 (25 May 2021 13:40)  HR: 63 (26 May 2021 05:20) (59 - 65)  BP: 172/70 (26 May 2021 05:20) (160/72 - 189/79)  BP(mean): --  RR: 18 (26 May 2021 05:20) (17 - 18)  SpO2: 96% (26 May 2021 05:20) (94% - 96%)    I&O's Summary    25 May 2021 07:01  -  26 May 2021 07:00  --------------------------------------------------------  IN: 1500 mL / OUT: 950 mL / NET: 550 mL          PHYSICAL EXAM:  TELE: SR 60s no events  Constitutional: NAD, awake and alert, well-developed  HEENT: Moist Mucous Membranes, Anicteric  Pulmonary: Non-labored, breath sounds are clear bilaterally, No wheezing, rales or rhonchi  Cardiovascular: Regular, S1 and S2, No murmurs, rubs, gallops or clicks  Gastrointestinal: Bowel Sounds present, soft, nontender.   Lymph: No peripheral edema. No lymphadenopathy.  Skin: No visible rashes or ulcers.  Jaundice noted.  Left arm with sling and ace wrap in place.  Swelling in hand with +radial pulses and good cap refill.  Psych:  Mood & affect appropriate    LABS: All Labs Reviewed:                        10.6   5.58  )-----------( 308      ( 26 May 2021 06:55 )             32.4                         11.6   10.10 )-----------( 402      ( 24 May 2021 15:29 )             35.3     26 May 2021 06:55    139    |  105    |  13     ----------------------------<  99     3.7     |  26     |  0.69   24 May 2021 15:29    136    |  102    |  36     ----------------------------<  192    5.0     |  27     |  1.80     Ca    8.4        26 May 2021 06:55  Ca    8.5        24 May 2021 15:29    TPro  6.3    /  Alb  2.7    /  TBili  0.8    /  DBili  x      /  AST  39     /  ALT  44     /  AlkPhos  159    26 May 2021 06:55  TPro  7.0    /  Alb  3.2    /  TBili  1.3    /  DBili  x      /  AST  107    /  ALT  97     /  AlkPhos  180    24 May 2021 15:29    PT/INR - ( 24 May 2021 15:29 )   PT: 13.6 sec;   INR: 1.17 ratio         PTT - ( 24 May 2021 15:29 )  PTT:26.6 sec  CARDIAC MARKERS ( 24 May 2021 15:29 )  .034 ng/mL / x     / x     / x     / x      < from: 12 Lead ECG (05.24.21 @ 14:58) >  Ventricular Rate 53 BPM    Atrial Rate 53 BPM    P-R Interval 146 ms    QRS Duration 64 ms    Q-T Interval 420 ms    QTC Calculation(Bazett) 394 ms    P Axis 28 degrees    R Axis 27 degrees    T Axis 49 degrees    Diagnosis Line Sinus bradycardia with marked sinus arrhythmia  Otherwise normal ECG  No previous ECGs available  Confirmed by Cheko Mendez MD ([id number]) on 5/25/2021 1:36:03 PM    < end of copied text >    < from: MR MRCP No Cont (05.25.21 @ 10:32) >  EXAM:  MR MRCP                            PROCEDURE DATE:  05/25/2021          INTERPRETATION:  CLINICAL INFORMATION: Transaminitis and sepsis. Dilated common bile duct.    COMPARISON: No prior abdominal MR is available for comparison. Reference ismade with a previous abdominal/pelvic CT dated 5/24/2021    CONTRAST/COMPLICATIONS:  IV Contrast: NONE  Oral Contrast: NONE  Complications: None reported at time of study completion    PROCEDURE:  MRI/MRCP was performed. Radial and 3D MRCP sequences were obtained.    FINDINGS:  LOWER CHEST: Trace left pleural effusion.    LIVER: Within normal limits.  BILE DUCTS: The common bile duct is dilated measuring 1.4 cm in diameter with gradual tapering distally. No evidence for choledocholithiasis.  GALLBLADDER: Gallstones in the gallbladder. No evidence for thickened gallbladder wall. Small pericholecystic fluid. If there is a clinical suspicion for acute cholecystitis, HIDA scan may be pursued for further evaluation.  SPLEEN: Within normal limits.  PANCREAS: No pancreatic mass is identified. Pancreas divisum.  ADRENALS: Within normal limits.  KIDNEYS/URETERS: Small brightly T2 hyperintense lesions in the kidneys bilaterally, representing probable cysts.    VISUALIZED PORTIONS:  BOWEL: Within normal limits.  PERITONEUM: Small ascites.  VESSELS: Within normal limits.  RETROPERITONEUM/LYMPH NODES: No lymphadenopathy.  ABDOMINAL WALL: Within normal limits.  BONES: Degenerative spondylosis.    IMPRESSION:  Dilated common bile duct measuring 1.4 cm in diameter with gradual tapering distally. No evidence for choledocholithiasis.    Pancreas divisum.    Gallstones in the gallbladder. No evidence for thickened gallbladder wall. Small pericholecystic fluid. If there is a clinical suspicion for acute cholecystitis, HIDA scan may be pursued for further evaluation.    Small ascites.    Trace left pleural effusion.    < from: Xray Elbow 2 Views Post-Reduction, Left (05.24.21 @ 20:42) >  EXAM:  XR HUMERUS MIN 2 VIEWS LT                          EXAM:  XR ELBOW POST RED AP LAT 2V LT                          EXAM:  XR FOREARM 2 VIEWS LT                            PROCEDURE DATE:  05/24/2021          INTERPRETATION:  CLINICAL INDICATION: Left arm pain status post fall, reduction.    TECHNIQUE:  AP and lateral views of the left humerus.  AP, oblique and lateral views of the left elbow.  AP and lateral views of the left forearm.    COMPARISON: XR: 5/24/2021. CT: 5/24/2021.    FINDINGS: Suboptimal views. Plaster cast obscuring fine bony and soft tissue detail.    Again noted, acute supra/intracondylar fracture of the left distal humerus. Suboptimal assessment of the alignment. Angulated deformity of the left radial neck, better assessed on the earlier studies. Left acromioclavicular arthropathy.    IMPRESSION:    As above.            WENDY BARROS MD; Attending Radiologist  This document has been electronically signed. May 25 2021  7:35AM    < end of copied text >          LEATHA VANCE MD; Attending Radiologist  This document has been electronically signed. May 25 2021  1:32PM    < end of copied text >    < from: Xray Humerus, Left (05.24.21 @ 20:39) >  EXAM:  XR HUMERUS MIN 2 VIEWS LT                          EXAM:  XR ELBOW POST RED AP LAT 2V LT                          EXAM:  XR FOREARM 2 VIEWS LT                            PROCEDURE DATE:  05/24/2021          INTERPRETATION:  CLINICAL INDICATION: Left arm pain status post fall, reduction.    TECHNIQUE:  AP and lateral views of the left humerus.  AP, oblique and lateral views of the left elbow.  AP and lateral views of the left forearm.    COMPARISON: XR: 5/24/2021. CT: 5/24/2021.    FINDINGS: Suboptimal views. Plaster cast obscuring fine bony and soft tissue detail.    Again noted, acute supra/intracondylar fracture of the left distal humerus. Suboptimal assessment of the alignment. Angulated deformity of the left radial neck, better assessed on the earlier studies. Left acromioclavicular arthropathy.    IMPRESSION:    As above.            WENDY BARROS MD; Attending Radiologist  This document has been electronically signed. May 25 2021  7:35AM    < end of copied text >

## 2021-05-26 NOTE — PROGRESS NOTE ADULT - ASSESSMENT
89 yo F PMHx T2DM, HTN, HLD, GERD, CAD (s/p LAD 2019 with stents placed on DAPT) admitted for sepsis likely due to intra-abdominal pathology, possible cholangitis given CBD dilation and elevated Tbili and LFTs.

## 2021-05-26 NOTE — PROGRESS NOTE ADULT - ASSESSMENT
dilated cbd  elevated lfts  fever    advance to clears  IV antibiotics as per ID   Bld cx noted  f/u repeat cultures  MRCP noted  HIDA today   plavix on hold  will follow        Advanced care planning was discussed with patient and family.  Advanced care planning forms were reviewed and discussed.  Risks, benefits and alternatives of gastroenterologic procedures were discussed in detail and all questions were answered.    30 minutes spent.

## 2021-05-26 NOTE — PROGRESS NOTE ADULT - ASSESSMENT
88 yo F PMHx T2DM, HTN, HLD, GERD, CAD (s/p LAD 2019 with stents placed on DAPT) brought in by Daughter for fever, weakness, vomiting and brought to ER. Noted to have left distal humerus fracture, ESBL E coli bacteremia and dilated biliary ducts with cholelithiasis and elevated AST/ALT: 107/97, alkphos: 180, total bilirubin: 1.3    RECOMMENDATIONS    ESBL E coli bacteremia with most compelling localization being the biliary system with dilated ducts and cholelithiasis, agree with MR CHOLANGIOPANCREATOGRAPHY, read pending. Exam is uncharacteristic with no RUQ pain, negative Mittal's sign but noted biochemical support as well as imaging. With concerns for polymicrobial infection and ESBL isolated  -escalated to meropenem  -repeat blood cultures this am  -HIDA scan today  -further recs to follow.

## 2021-05-26 NOTE — PROVIDER CONTACT NOTE (OTHER) - ACTION/TREATMENT ORDERED:
Dr. Willis made aware and said he will speak to Dr. Derrick Alexander regarding ICU consult   Pt placed on NRB  13:43, RRT called, SpO2 remaining 85% on NRB

## 2021-05-26 NOTE — PROGRESS NOTE ADULT - TIME BILLING
Patient seen and examined at bedside. Chart, meds, labs, vital reviewed. Spoke to patient in her language, Janelle/ benigno, she understood.

## 2021-05-26 NOTE — PROGRESS NOTE ADULT - SUBJECTIVE AND OBJECTIVE BOX
INTERVAL HPI/OVERNIGHT EVENTS:  pt seen and examined   No acute events overnight as per nursing staff     MEDICATIONS  (STANDING):  aspirin enteric coated 81 milliGRAM(s) Oral daily  dextrose 40% Gel 15 Gram(s) Oral once  dextrose 5%. 1000 milliLiter(s) (50 mL/Hr) IV Continuous <Continuous>  dextrose 5%. 1000 milliLiter(s) (100 mL/Hr) IV Continuous <Continuous>  dextrose 50% Injectable 25 Gram(s) IV Push once  dextrose 50% Injectable 12.5 Gram(s) IV Push once  dextrose 50% Injectable 25 Gram(s) IV Push once  glucagon  Injectable 1 milliGRAM(s) IntraMuscular once  heparin   Injectable 5000 Unit(s) SubCutaneous every 12 hours  insulin lispro (ADMELOG) corrective regimen sliding scale   SubCutaneous every 6 hours  melatonin 5 milliGRAM(s) Oral at bedtime  meropenem  IVPB 500 milliGRAM(s) IV Intermittent every 12 hours  pantoprazole    Tablet 40 milliGRAM(s) Oral before breakfast  senna 2 Tablet(s) Oral at bedtime  sucralfate 1 Gram(s) Oral every 12 hours    MEDICATIONS  (PRN):  acetaminophen   Tablet .. 650 milliGRAM(s) Oral every 6 hours PRN Mild Pain (1 - 3)  hydrALAZINE Injectable 10 milliGRAM(s) IV Push every 6 hours PRN For sbp>140  morphine  - Injectable 2 milliGRAM(s) IV Push every 6 hours PRN Severe Pain (7 - 10)  ondansetron Injectable 4 milliGRAM(s) IV Push every 8 hours PRN Nausea and/or Vomiting  polyethylene glycol 3350 17 Gram(s) Oral daily PRN Constipation      Allergies  No Known Allergies    Intolerances    ROS:  General:  No wt loss, + fevers, chills, night sweats, fatigue,   Eyes:  Good vision, no reported pain  ENT:  No sore throat, pain, runny nose, dysphagia  CV:  No pain, palpitations, hypo/hypertension  Resp:  No dyspnea, cough, tachypnea, wheezing  GI:  No pain, No nausea, No vomiting, No diarrhea, No constipation, No weight loss, No fever, No pruritis, No rectal bleeding, No tarry stools, No dysphagia,  :  No pain, bleeding, incontinence, nocturia  Muscle:  No pain, weakness  Neuro:  No weakness, tingling, memory problems  Psych:  No fatigue, insomnia, mood problems, depression  Endocrine:  No polyuria, polydipsia, cold/heat intolerance  Heme:  No petechiae, ecchymosis, easy bruisability  Skin:  No rash, tattoos, scars, edema      Vital Signs Last 24 Hrs  T(C): 36.6 (26 May 2021 08:10), Max: 36.8 (25 May 2021 13:40)  T(F): 97.8 (26 May 2021 08:10), Max: 98.3 (25 May 2021 13:40)  HR: 66 (26 May 2021 08:10) (59 - 66)  BP: 166/72 (26 May 2021 08:10) (160/72 - 189/79)  BP(mean): --  RR: 18 (26 May 2021 08:10) (17 - 18)  SpO2: 97% (26 May 2021 08:10) (94% - 97%)    GENERAL:  Appears stated age,   HEENT:  NC/AT,    CHEST:  Full & symmetric excursion,   HEART:  Regular rhythm  ABDOMEN:  Soft, non-tender, non-distended,   EXTEREMITIES:  no cyanosis,clubbing or edema  SKIN:  No rash  NEURO:  Alert,          LABS:                        10.6   5.58  )-----------( 308      ( 26 May 2021 06:55 )             32.4         139  |  105  |  13  ----------------------------<  99  3.7   |  26  |  0.69    Ca    8.4<L>      26 May 2021 06:55    TPro  6.3  /  Alb  2.7<L>  /  TBili  0.8  /  DBili  x   /  AST  39<H>  /  ALT  44  /  AlkPhos  159<H>      PT/INR - ( 24 May 2021 15:29 )   PT: 13.6 sec;   INR: 1.17 ratio         PTT - ( 24 May 2021 15:29 )  PTT:26.6 sec  Urinalysis Basic - ( 25 May 2021 20:55 )    Color: Yellow / Appearance: Clear / S.010 / pH: x  Gluc: x / Ketone: Negative  / Bili: Negative / Urobili: Negative   Blood: x / Protein: Negative / Nitrite: Negative   Leuk Esterase: Negative / RBC: x / WBC x   Sq Epi: x / Non Sq Epi: x / Bacteria: x      Culture - Blood (collected 24 May 2021 22:07)  Source: .Blood Blood-Peripheral  Gram Stain (25 May 2021 10:05):    Growth in aerobic bottle: Gram Negative Rods  Preliminary Report (25 May 2021 11:07):    Growth in aerobic bottle: Gram Negative Rods    MDRO detected in BCID PCR, resistance marker = CTX-M (ESBL)    ***Blood Panel PCR results on this specimen are available    approximately 3 hours after the Gram stain result.***    Gram stain, PCR, and/or culture results may not always    correspond due to difference in methodologies.    ************************************************************    This PCR assay was performed by multiplex PCR. This    Assay tests for 66 bacterial and resistance gene targets.    Please refer to the [employer] test directory    at [email or web address] for details.  Organism: Blood Culture PCR (25 May 2021 11:07)  Organism: Blood Culture PCR (25 May 2021 11:07)    Culture - Blood (collected 24 May 2021 22:07)  Source: .Blood Blood-Peripheral  Preliminary Report (25 May 2021 23:01):    No growth to date.        RADIOLOGY & ADDITIONAL TESTS:  EXAM:  MR MRCP                        PROCEDURE DATE:  2021    INTERPRETATION:  CLINICAL INFORMATION: Transaminitis and sepsis. Dilated common bile duct.  COMPARISON: No prior abdominal MR is available for comparison. Reference is made with a previous abdominal/pelvic CT dated 2021  CONTRAST/COMPLICATIONS:  IV Contrast: NONE  Oral Contrast: NONE  Complications: None reported at time of study completion    PROCEDURE:  MRI/MRCP was performed. Radial and 3D MRCP sequences were obtained.    FINDINGS:  LOWER CHEST: Trace left pleural effusion.  LIVER: Within normal limits.  BILE DUCTS: The common bile duct is dilated measuring 1.4 cm in diameter with gradual tapering distally. No evidence for choledocholithiasis.  GALLBLADDER: Gallstones in the gallbladder. No evidence for thickened gallbladder wall. Small pericholecystic fluid. If there is a clinical suspicion for acute cholecystitis, HIDA scan may be pursued for further evaluation.  SPLEEN: Within normal limits.  PANCREAS: No pancreatic mass is identified. Pancreas divisum.  ADRENALS: Within normal limits.  KIDNEYS/URETERS: Small brightly T2 hyperintense lesions in the kidneys bilaterally, representing probable cysts.    VISUALIZED PORTIONS:  BOWEL: Within normal limits.  PERITONEUM: Small ascites.  VESSELS: Within normal limits.  RETROPERITONEUM/LYMPH NODES: No lymphadenopathy.  ABDOMINAL WALL: Within normal limits.  BONES: Degenerative spondylosis.    IMPRESSION:  Dilated common bile duct measuring 1.4 cm in diameter with gradual tapering distally. No evidence for choledocholithiasis.  Pancreas divisum.  Gallstones in the gallbladder. No evidence for thickened gallbladder wall. Small pericholecystic fluid. If there is a clinical suspicion for acute cholecystitis, HIDA scan may be pursued for further evaluation.  Small ascites.  Trace left pleural effusion.

## 2021-05-26 NOTE — PROGRESS NOTE ADULT - ASSESSMENT
89 yo F PMHx T2DM, HTN, HLD, GERD, CAD (s/p LAD/D1 stents, placed on DAPT) admitted for sepsis likely due to intra-abdominal pathology, possible cholangitis given CBD dilation and elevated Tbili and LFTs. Cardiology consulted for medical clearance for possible ERCP.    #CAD s/p 2 stents placed in 2015 of the LAD/D1, on DAPT   - Patient is not complaining of any cardiac symptoms at this time.  - EKG: sinus bradycardia HR: 53, no signs of acute ischemia, no changes compared to previous EKG (EKG in 2019 was sinus bradycardia HR: 54)  - No clear evidence of acute ischemia, trops negative x 1.   - No meaningful evidence of volume overload.  - No previous echo on record.   - Recent cath in 2019-Nonobstructive CAD, patent stents, EF: 65%   - Discontinue Plavix, continue Aspirin as patient no longer needs dual antiplatelet therapy.      #HTN  - BP: 172/70 (26 May 2021 05:20) (160/72 - 189/79).  May be related to pain and not getting her home medications.    - Hydralazine 10mg IVP q6H prn for SBP >140.  Would give a dose now as pt is NPO for ERCP.   - BP now elevated given that BP meds were held due to hypotension on admission and pt currently being on fluids    - would restart BP medications as needed, and start with atenolol so as to avoid withdrawal tachycardia   - Monitor routine hemodynamics    #HLD  - Continue home statin when LFTs stabilize      Cardiac Clearance for ERCP:  - Pt has no evidence of ischemia, decompensated heart failure, unstable arrythmia, or severe stenotic valvular disease.  - Able to walk >4 METS without any anginal symptoms.   - RCRI score 1-Class II Risk   - In the setting of low risk procedure, patient is optimized from cardiovascular standpoint to proceed with planned procedure with routine hemodynamic monitoring.     Dilated CBD  - MRCP with dilated CBD   - elevated LFTs now downtrending  - continue ABX as per ID for Bld CX + GNR  - ERCP today    S/p Fall with Fractured L humerus  - Ortho following  - Pain control  - I/S       - Monitor and replete lytes, keep K>4, Mg>2  - Other cardiovascular workup will depend on clinical course.  - All other workup per primary team  - Will continue to follow    WILFRED Davidson-BC  Cardiology NP  Spectra [phone]

## 2021-05-27 LAB
-  AMIKACIN: SIGNIFICANT CHANGE UP
-  AMPICILLIN/SULBACTAM: SIGNIFICANT CHANGE UP
-  AMPICILLIN: SIGNIFICANT CHANGE UP
-  AZTREONAM: SIGNIFICANT CHANGE UP
-  CEFAZOLIN: SIGNIFICANT CHANGE UP
-  CEFEPIME: SIGNIFICANT CHANGE UP
-  CEFOXITIN: SIGNIFICANT CHANGE UP
-  CEFTRIAXONE: SIGNIFICANT CHANGE UP
-  CIPROFLOXACIN: SIGNIFICANT CHANGE UP
-  ERTAPENEM: SIGNIFICANT CHANGE UP
-  GENTAMICIN: SIGNIFICANT CHANGE UP
-  IMIPENEM: SIGNIFICANT CHANGE UP
-  LEVOFLOXACIN: SIGNIFICANT CHANGE UP
-  MEROPENEM: SIGNIFICANT CHANGE UP
-  PIPERACILLIN/TAZOBACTAM: SIGNIFICANT CHANGE UP
-  TOBRAMYCIN: SIGNIFICANT CHANGE UP
-  TRIMETHOPRIM/SULFAMETHOXAZOLE: SIGNIFICANT CHANGE UP
A1C WITH ESTIMATED AVERAGE GLUCOSE RESULT: 6 % — HIGH (ref 4–5.6)
ALBUMIN SERPL ELPH-MCNC: 2.7 G/DL — LOW (ref 3.3–5)
ALP SERPL-CCNC: 165 U/L — HIGH (ref 40–120)
ALT FLD-CCNC: 34 U/L — SIGNIFICANT CHANGE UP (ref 12–78)
ANION GAP SERPL CALC-SCNC: 8 MMOL/L — SIGNIFICANT CHANGE UP (ref 5–17)
AST SERPL-CCNC: 30 U/L — SIGNIFICANT CHANGE UP (ref 15–37)
BILIRUB SERPL-MCNC: 0.7 MG/DL — SIGNIFICANT CHANGE UP (ref 0.2–1.2)
BUN SERPL-MCNC: 14 MG/DL — SIGNIFICANT CHANGE UP (ref 7–23)
CALCIUM SERPL-MCNC: 8.7 MG/DL — SIGNIFICANT CHANGE UP (ref 8.5–10.1)
CHLORIDE SERPL-SCNC: 104 MMOL/L — SIGNIFICANT CHANGE UP (ref 96–108)
CO2 SERPL-SCNC: 27 MMOL/L — SIGNIFICANT CHANGE UP (ref 22–31)
CREAT SERPL-MCNC: 0.73 MG/DL — SIGNIFICANT CHANGE UP (ref 0.5–1.3)
CULTURE RESULTS: SIGNIFICANT CHANGE UP
ESTIMATED AVERAGE GLUCOSE: 126 MG/DL — HIGH (ref 68–114)
GLUCOSE SERPL-MCNC: 94 MG/DL — SIGNIFICANT CHANGE UP (ref 70–99)
HCT VFR BLD CALC: 34.2 % — LOW (ref 34.5–45)
HGB BLD-MCNC: 11 G/DL — LOW (ref 11.5–15.5)
MCHC RBC-ENTMCNC: 30.5 PG — SIGNIFICANT CHANGE UP (ref 27–34)
MCHC RBC-ENTMCNC: 32.2 GM/DL — SIGNIFICANT CHANGE UP (ref 32–36)
MCV RBC AUTO: 94.7 FL — SIGNIFICANT CHANGE UP (ref 80–100)
METHOD TYPE: SIGNIFICANT CHANGE UP
NRBC # BLD: 0 /100 WBCS — SIGNIFICANT CHANGE UP (ref 0–0)
ORGANISM # SPEC MICROSCOPIC CNT: SIGNIFICANT CHANGE UP
PLATELET # BLD AUTO: 357 K/UL — SIGNIFICANT CHANGE UP (ref 150–400)
POTASSIUM SERPL-MCNC: 3.9 MMOL/L — SIGNIFICANT CHANGE UP (ref 3.5–5.3)
POTASSIUM SERPL-SCNC: 3.9 MMOL/L — SIGNIFICANT CHANGE UP (ref 3.5–5.3)
PROT SERPL-MCNC: 6.5 G/DL — SIGNIFICANT CHANGE UP (ref 6–8.3)
RBC # BLD: 3.61 M/UL — LOW (ref 3.8–5.2)
RBC # FLD: 12.3 % — SIGNIFICANT CHANGE UP (ref 10.3–14.5)
SODIUM SERPL-SCNC: 139 MMOL/L — SIGNIFICANT CHANGE UP (ref 135–145)
SPECIMEN SOURCE: SIGNIFICANT CHANGE UP
WBC # BLD: 5.36 K/UL — SIGNIFICANT CHANGE UP (ref 3.8–10.5)
WBC # FLD AUTO: 5.36 K/UL — SIGNIFICANT CHANGE UP (ref 3.8–10.5)

## 2021-05-27 PROCEDURE — 99233 SBSQ HOSP IP/OBS HIGH 50: CPT

## 2021-05-27 PROCEDURE — 99232 SBSQ HOSP IP/OBS MODERATE 35: CPT

## 2021-05-27 RX ADMIN — Medication 1 GRAM(S): at 17:35

## 2021-05-27 RX ADMIN — HEPARIN SODIUM 5000 UNIT(S): 5000 INJECTION INTRAVENOUS; SUBCUTANEOUS at 17:35

## 2021-05-27 RX ADMIN — Medication 1 GRAM(S): at 05:08

## 2021-05-27 RX ADMIN — SENNA PLUS 2 TABLET(S): 8.6 TABLET ORAL at 21:16

## 2021-05-27 RX ADMIN — MEROPENEM 100 MILLIGRAM(S): 1 INJECTION INTRAVENOUS at 14:38

## 2021-05-27 RX ADMIN — MEROPENEM 100 MILLIGRAM(S): 1 INJECTION INTRAVENOUS at 21:16

## 2021-05-27 RX ADMIN — HEPARIN SODIUM 5000 UNIT(S): 5000 INJECTION INTRAVENOUS; SUBCUTANEOUS at 05:08

## 2021-05-27 RX ADMIN — PANTOPRAZOLE SODIUM 40 MILLIGRAM(S): 20 TABLET, DELAYED RELEASE ORAL at 05:09

## 2021-05-27 RX ADMIN — Medication 81 MILLIGRAM(S): at 12:21

## 2021-05-27 RX ADMIN — MEROPENEM 100 MILLIGRAM(S): 1 INJECTION INTRAVENOUS at 05:08

## 2021-05-27 RX ADMIN — Medication 5 MILLIGRAM(S): at 21:16

## 2021-05-27 NOTE — PROGRESS NOTE ADULT - ASSESSMENT
89 yo F PMHx T2DM, HTN, HLD, GERD, CAD (s/p LAD 2019 with stents placed on DAPT) brought in by Daughter for fever, weakness, vomiting and brought to ER. Noted to have left distal humerus fracture, ESBL E coli bacteremia and dilated biliary ducts with cholelithiasis and elevated AST/ALT: 107/97, alkphos: 180, total bilirubin: 1.3    RECOMMENDATIONS    ESBL E coli bacteremia with most compelling localization being the biliary system with dilated ducts and cholelithiasis, agree with MR CHOLANGIOPANCREATOGRAPHY, read pending. Exam is uncharacteristic with no RUQ pain, negative Mittal's sign but noted biochemical support as well as imaging. With concerns for polymicrobial infection and ESBL isolated  5/26-escalated to meropenem, HIDA neg so suspect passed stone, 5/26-blood cultures -NGTD  -anticipate 6/1 as last day of IV abx

## 2021-05-27 NOTE — DIETITIAN INITIAL EVALUATION ADULT. - PROBLEM SELECTOR PLAN 2
BP noted to be 66/47, Lactate 2.5 on admission, febrile 101 at home; given 2L fluids in ED  - likely secondary to cholangitis; CT showed Cholelithiasis. Dilated common bile duct.  - continue IV zosyn to cover intraabdominal luba  - maintenance IVF Ns @75 cc/hour  - NPO for now except medications  - follow up repeat lactate  - follow up blood/urine cultures  - tyelenol PRN for fever

## 2021-05-27 NOTE — PROGRESS NOTE ADULT - TIME BILLING
D/c planning. Family does not feel that home w/ IV abx is manageable but she will consider the options

## 2021-05-27 NOTE — DIETITIAN INITIAL EVALUATION ADULT. - PROBLEM SELECTOR PLAN 3
s/p mechanical fall with X ray left elbow: Distal LEFT humerus T-shaped intra-articular, intercondylar fracture deformity.  - seen and splinted by orthopedics in the ED  - pain control: tylenol for mild, oxy 5 mg for moderate and 10 mg for severe  - fu CT Left Elbow, orthopedic recs appreciated   - PT consulted

## 2021-05-27 NOTE — DIETITIAN INITIAL EVALUATION ADULT. - PROBLEM SELECTOR PLAN 4
On isosorbide dinitrate, losartan, atenolol at home   - HOLD antihypertensives 2/2 to sepsis with profound hypotension  - continue to follow blood pressure trend, reinitiate BP meds as needed  - continue IVF maintenance

## 2021-05-27 NOTE — DIETITIAN INITIAL EVALUATION ADULT. - ADD RECOMMEND
1) Continue with current Soft, Low fiber, DASH/TLC, Consistent CHO diet (no meat, fish, eggs; only dairy) diet, 2) Encourage adequate PO intake, pt's family state they will bring food from home for pt, 3) Monitor pt's PO intake, weight, skin, edema, GI distress

## 2021-05-27 NOTE — DIETITIAN INITIAL EVALUATION ADULT. - OTHER INFO
As per chart pt is a 89 year old female with a PMH of T2DM, HTN, HLD, GERD, CAD (s/p LAD 2019 with stents placed on DAPT) admitted for sepsis likely due to intra-abdominal pathology, possible cholangitis given CBD dilation and elevated Tbili and LFTs.       Pt seen at bedside. S/P (5/25) MRCP found to be negative for CBD stone + G stone, s/p (5/25) HIDA scan. Pt is currently on Clear Liquid diet. As per chart pt is a 89 year old female with a PMH of T2DM, HTN, HLD, GERD, CAD (s/p LAD 2019 with stents placed on DAPT) admitted for sepsis likely due to intra-abdominal pathology, possible cholangitis given CBD dilation and elevated Tbili and LFTs.       Pt seen at bedside, additional information obtained from pt's daughter over the phone. S/P (5/25) MRCP found to be negative for CBD stone + G stone, s/p (5/25) HIDA scan. Pt was on Clear Liquid diet, spoke with MD about diet advancement, diet now advanced to Low fiber. Pt was tolerating clear liquids well. Pt's admission weight 142lbs, current weights per chart (5/267) 138.2lbs, (5/26) 141.9lbs, (5/25) 137.5lbs. Pt's daughter reports pt's current and UBW of 140-142lbs, stable denies any significant recent weight changes. Appears appropriately nourished for advanced age. Pt noted with missing teeth, per daughter needs a soft, cut-up diet. No GI distress noted at this time.     No pressure injuries noted at this time

## 2021-05-27 NOTE — PROGRESS NOTE ADULT - ASSESSMENT
dilated cbd  elevated lfts  fever    reg diet  IV antibiotics as per ID   Bld cx noted  f/u repeat cultures  MRCP noted  HIDA hegative  suspect passed stone  repeat cultures negative  no need for ercp  plavix on hold  will follow        Advanced care planning was discussed with patient and family.  Advanced care planning forms were reviewed and discussed.  Risks, benefits and alternatives of gastroenterologic procedures were discussed in detail and all questions were answered.    30 minutes spent.

## 2021-05-27 NOTE — DIETITIAN INITIAL EVALUATION ADULT. - PROBLEM SELECTOR PLAN 10
BUN/Cr: 36/1.8 likely prerenal 2/2 to sepsis, volume depletion  - start IVF maintenance   - eGFR noted to be 25, possible component of chronic kidney disease; continue to monitor renal function, hold   nephrotoxic agents, avoid NSAIDs    11. Constipation likely 2/2 to immobilization and opiates   - stool burden noted on CT A/P   - patient c/o no BM 3 days   - will give fleet enema  - bowel regimen senna/miralax    11. Need for prophylactic measures  - start heparin subQ for DVT prophylaxis

## 2021-05-27 NOTE — DIETITIAN INITIAL EVALUATION ADULT. - PROBLEM SELECTOR PLAN 1
CT showed showed Cholelithiasis. Dilated common bile duct, with fever, vomiting and decreased appetite; with elevated Tbili and transaminitis   - NPO, IV fluids  - given high fever at home, there is a concern for cholangitis - c/w zosyn  - follow up blood cultures  - will likely need MRCP +/- ERCP, f/u AM LFTs/bili  - SAADIA Hernandez consulted; f/u recs

## 2021-05-27 NOTE — PROGRESS NOTE ADULT - SUBJECTIVE AND OBJECTIVE BOX
[facility] -- Marsha Agarwal Grossman, [place], Damian Lemus, Gayathri Ordoñez: Office # [phone]    Follow Up: Sepsis, Hx of CAD stents with CBD stone for Cardiac clearance    Subjective/Observations: Patient seen and examined. Patient awake, alert, resting in bed. No complaints of chest pain, dyspnea, palpitations or dizziness. No signs of orthopnea or PND. Lt arm on sling. Reports some pain on chest which is tender to palpation. Reports poor appetite. Tolerating room air.     REVIEW OF SYSTEMS: All review of systems is negative for eye, ENT, GI, , allergic, dermatologic, musculoskeletal and neurologic except as described above    PAST MEDICAL & SURGICAL HISTORY:  Diabetes mellitus    CAD (coronary artery disease) 2 cardiac stents placed  Hypertension    Hyperlipidemia    Bilateral cataracts    MEDICATIONS  (STANDING):  aspirin enteric coated 81 milliGRAM(s) Oral daily  dextrose 40% Gel 15 Gram(s) Oral once  dextrose 5%. 1000 milliLiter(s) (50 mL/Hr) IV Continuous <Continuous>  dextrose 5%. 1000 milliLiter(s) (100 mL/Hr) IV Continuous <Continuous>  dextrose 50% Injectable 25 Gram(s) IV Push once  dextrose 50% Injectable 12.5 Gram(s) IV Push once  dextrose 50% Injectable 25 Gram(s) IV Push once  glucagon  Injectable 1 milliGRAM(s) IntraMuscular once  heparin   Injectable 5000 Unit(s) SubCutaneous every 12 hours  insulin lispro (ADMELOG) corrective regimen sliding scale   SubCutaneous three times a day before meals  insulin lispro (ADMELOG) corrective regimen sliding scale   SubCutaneous at bedtime  melatonin 5 milliGRAM(s) Oral at bedtime  meropenem  IVPB 1000 milliGRAM(s) IV Intermittent every 8 hours  pantoprazole    Tablet 40 milliGRAM(s) Oral before breakfast  senna 2 Tablet(s) Oral at bedtime  sucralfate 1 Gram(s) Oral every 12 hours    MEDICATIONS  (PRN):  acetaminophen   Tablet .. 650 milliGRAM(s) Oral every 6 hours PRN Mild Pain (1 - 3)  hydrALAZINE Injectable 10 milliGRAM(s) IV Push every 6 hours PRN For sbp>140  morphine  - Injectable 2 milliGRAM(s) IV Push every 6 hours PRN Severe Pain (7 - 10)  ondansetron Injectable 4 milliGRAM(s) IV Push every 8 hours PRN Nausea and/or Vomiting  polyethylene glycol 3350 17 Gram(s) Oral daily PRN Constipation    Allergies  No Known Allergies    Vital Signs Last 24 Hrs  T(C): 36.9 (27 May 2021 06:12), Max: 37.3 (26 May 2021 19:17)  T(F): 98.5 (27 May 2021 06:12), Max: 99.2 (26 May 2021 19:17)  HR: 74 (27 May 2021 06:12) (61 - 74)  BP: 169/76 (27 May 2021 06:12) (128/66 - 189/75)  BP(mean): --  RR: 18 (27 May 2021 06:12) (16 - 18)  SpO2: 95% (27 May 2021 06:12) (94% - 99%)  I&O's Summary    26 May 2021 07:01  -  27 May 2021 07:00  --------------------------------------------------------  IN: 350 mL / OUT: 0 mL / NET: 350 mL    TELE: Not on telemetry   PHYSICAL EXAM:  Appearance: NAD, no distress, alert, Frail   HEENT: Moist Mucous Membranes, Anicteric  Cardiovascular: Regular rate and rhythm, Normal S1 S2, No JVD, No murmurs, No rubs, gallops or clicks  Respiratory: Non-labored, Clear to auscultation, No rales, No rhonchi, No wheezing.   Gastrointestinal:  Soft, Non-tender, + BS  Neurologic: Non-focal  Skin: Warm and dry, No visible rashes or ulcers, No ecchymosis, No cyanosis  Musculoskeletal: Left arm with sling and ace wrap in place. No clubbing, No cyanosis, No joint swelling/tenderness  Psychiatry: Mood & affect appropriate  Lymph: No peripheral edema.     LABS: All Labs Reviewed:                        11.0   5.36  )-----------( 357      ( 27 May 2021 07:03 )             34.2                         10.6   5.58  )-----------( 308      ( 26 May 2021 06:55 )             32.4                         11.6   10.10 )-----------( 402      ( 24 May 2021 15:29 )             35.3     27 May 2021 07:03    139    |  104    |  14     ----------------------------<  94     3.9     |  27     |  0.73   26 May 2021 06:55    139    |  105    |  13     ----------------------------<  99     3.7     |  26     |  0.69   24 May 2021 15:29    136    |  102    |  36     ----------------------------<  192    5.0     |  27     |  1.80     Ca    8.7        27 May 2021 07:03  Ca    8.4        26 May 2021 06:55  Ca    8.5        24 May 2021 15:29    TPro  6.5    /  Alb  2.7    /  TBili  0.7    /  DBili  x      /  AST  30     /  ALT  34     /  AlkPhos  165    27 May 2021 07:03  TPro  6.3    /  Alb  2.7    /  TBili  0.8    /  DBili  x      /  AST  39     /  ALT  44     /  AlkPhos  159    26 May 2021 06:55  TPro  7.0    /  Alb  3.2    /  TBili  1.3    /  DBili  x      /  AST  107    /  ALT  97     /  AlkPhos  180    24 May 2021 15:29    Troponin I, Serum: .034 ng/mL (05-24-21 @ 15:29)  Lactate, Blood: 1.1 mmol/L (05-24-21 @ 21:02)  Lactate, Blood: 2.5 mmol/L (05-24-21 @ 15:29)    12 Lead ECG:   Ventricular Rate 53 BPM  Atrial Rate 53 BPM  P-R Interval 146 ms  QRS Duration 64 ms  Q-T Interval 420 ms  QTC Calculation(Bazett) 394 ms  P Axis 28 degrees  R Axis 27 degrees  T Axis 49 degrees  Diagnosis Line Sinus bradycardia with marked sinus arrhythmia  Otherwise normal ECG  No previous ECGs available  Confirmed by hCeko Mendez MD ([id number]) on 5/25/2021 1:36:03 PM (05-24-21 @ 14:58)

## 2021-05-27 NOTE — PROGRESS NOTE ADULT - SUBJECTIVE AND OBJECTIVE BOX
INTERVAL HPI/OVERNIGHT EVENTS:  pt seen and examined   No acute events overnight as per nursing staff     MEDICATIONS  (STANDING):  aspirin enteric coated 81 milliGRAM(s) Oral daily  dextrose 40% Gel 15 Gram(s) Oral once  dextrose 5%. 1000 milliLiter(s) (50 mL/Hr) IV Continuous <Continuous>  dextrose 5%. 1000 milliLiter(s) (100 mL/Hr) IV Continuous <Continuous>  dextrose 50% Injectable 25 Gram(s) IV Push once  dextrose 50% Injectable 12.5 Gram(s) IV Push once  dextrose 50% Injectable 25 Gram(s) IV Push once  glucagon  Injectable 1 milliGRAM(s) IntraMuscular once  heparin   Injectable 5000 Unit(s) SubCutaneous every 12 hours  insulin lispro (ADMELOG) corrective regimen sliding scale   SubCutaneous every 6 hours  melatonin 5 milliGRAM(s) Oral at bedtime  meropenem  IVPB 500 milliGRAM(s) IV Intermittent every 12 hours  pantoprazole    Tablet 40 milliGRAM(s) Oral before breakfast  senna 2 Tablet(s) Oral at bedtime  sucralfate 1 Gram(s) Oral every 12 hours    MEDICATIONS  (PRN):  acetaminophen   Tablet .. 650 milliGRAM(s) Oral every 6 hours PRN Mild Pain (1 - 3)  hydrALAZINE Injectable 10 milliGRAM(s) IV Push every 6 hours PRN For sbp>140  morphine  - Injectable 2 milliGRAM(s) IV Push every 6 hours PRN Severe Pain (7 - 10)  ondansetron Injectable 4 milliGRAM(s) IV Push every 8 hours PRN Nausea and/or Vomiting  polyethylene glycol 3350 17 Gram(s) Oral daily PRN Constipation      Allergies  No Known Allergies    Intolerances    ROS:  General:  No wt loss, + fevers, chills, night sweats, fatigue,   Eyes:  Good vision, no reported pain  ENT:  No sore throat, pain, runny nose, dysphagia  CV:  No pain, palpitations, hypo/hypertension  Resp:  No dyspnea, cough, tachypnea, wheezing  GI:  No pain, No nausea, No vomiting, No diarrhea, No constipation, No weight loss, No fever, No pruritis, No rectal bleeding, No tarry stools, No dysphagia,  :  No pain, bleeding, incontinence, nocturia  Muscle:  No pain, weakness  Neuro:  No weakness, tingling, memory problems  Psych:  No fatigue, insomnia, mood problems, depression  Endocrine:  No polyuria, polydipsia, cold/heat intolerance  Heme:  No petechiae, ecchymosis, easy bruisability  Skin:  No rash, tattoos, scars, edema      Vital Signs Last 24 Hrs  T(C): 36.6 (26 May 2021 08:10), Max: 36.8 (25 May 2021 13:40)  T(F): 97.8 (26 May 2021 08:10), Max: 98.3 (25 May 2021 13:40)  HR: 66 (26 May 2021 08:10) (59 - 66)  BP: 166/72 (26 May 2021 08:10) (160/72 - 189/79)  BP(mean): --  RR: 18 (26 May 2021 08:10) (17 - 18)  SpO2: 97% (26 May 2021 08:10) (94% - 97%)    GENERAL:  Appears stated age,   HEENT:  NC/AT,    CHEST:  Full & symmetric excursion,   HEART:  Regular rhythm  ABDOMEN:  Soft, non-tender, non-distended,   EXTEREMITIES:  no cyanosis,clubbing or edema  SKIN:  No rash  NEURO:  Alert,          LABS:                        10.6   5.58  )-----------( 308      ( 26 May 2021 06:55 )             32.4         139  |  105  |  13  ----------------------------<  99  3.7   |  26  |  0.69    Ca    8.4<L>      26 May 2021 06:55    TPro  6.3  /  Alb  2.7<L>  /  TBili  0.8  /  DBili  x   /  AST  39<H>  /  ALT  44  /  AlkPhos  159<H>      PT/INR - ( 24 May 2021 15:29 )   PT: 13.6 sec;   INR: 1.17 ratio         PTT - ( 24 May 2021 15:29 )  PTT:26.6 sec  Urinalysis Basic - ( 25 May 2021 20:55 )    Color: Yellow / Appearance: Clear / S.010 / pH: x  Gluc: x / Ketone: Negative  / Bili: Negative / Urobili: Negative   Blood: x / Protein: Negative / Nitrite: Negative   Leuk Esterase: Negative / RBC: x / WBC x   Sq Epi: x / Non Sq Epi: x / Bacteria: x      Culture - Blood (collected 24 May 2021 22:07)  Source: .Blood Blood-Peripheral  Gram Stain (25 May 2021 10:05):    Growth in aerobic bottle: Gram Negative Rods  Preliminary Report (25 May 2021 11:07):    Growth in aerobic bottle: Gram Negative Rods    MDRO detected in BCID PCR, resistance marker = CTX-M (ESBL)    ***Blood Panel PCR results on this specimen are available    approximately 3 hours after the Gram stain result.***    Gram stain, PCR, and/or culture results may not always    correspond due to difference in methodologies.    ************************************************************    This PCR assay was performed by multiplex PCR. This    Assay tests for 66 bacterial and resistance gene targets.    Please refer to the [employer] test directory    at [email or web address] for details.  Organism: Blood Culture PCR (25 May 2021 11:07)  Organism: Blood Culture PCR (25 May 2021 11:07)    Culture - Blood (collected 24 May 2021 22:07)  Source: .Blood Blood-Peripheral  Preliminary Report (25 May 2021 23:01):    No growth to date.        RADIOLOGY & ADDITIONAL TESTS:  EXAM:  MR MRCP                        PROCEDURE DATE:  2021    INTERPRETATION:  CLINICAL INFORMATION: Transaminitis and sepsis. Dilated common bile duct.  COMPARISON: No prior abdominal MR is available for comparison. Reference is made with a previous abdominal/pelvic CT dated 2021  CONTRAST/COMPLICATIONS:  IV Contrast: NONE  Oral Contrast: NONE  Complications: None reported at time of study completion    PROCEDURE:  MRI/MRCP was performed. Radial and 3D MRCP sequences were obtained.    FINDINGS:  LOWER CHEST: Trace left pleural effusion.  LIVER: Within normal limits.  BILE DUCTS: The common bile duct is dilated measuring 1.4 cm in diameter with gradual tapering distally. No evidence for choledocholithiasis.  GALLBLADDER: Gallstones in the gallbladder. No evidence for thickened gallbladder wall. Small pericholecystic fluid. If there is a clinical suspicion for acute cholecystitis, HIDA scan may be pursued for further evaluation.  SPLEEN: Within normal limits.  PANCREAS: No pancreatic mass is identified. Pancreas divisum.  ADRENALS: Within normal limits.  KIDNEYS/URETERS: Small brightly T2 hyperintense lesions in the kidneys bilaterally, representing probable cysts.    VISUALIZED PORTIONS:  BOWEL: Within normal limits.  PERITONEUM: Small ascites.  VESSELS: Within normal limits.  RETROPERITONEUM/LYMPH NODES: No lymphadenopathy.  ABDOMINAL WALL: Within normal limits.  BONES: Degenerative spondylosis.    IMPRESSION:  Dilated common bile duct measuring 1.4 cm in diameter with gradual tapering distally. No evidence for choledocholithiasis.  Pancreas divisum.  Gallstones in the gallbladder. No evidence for thickened gallbladder wall. Small pericholecystic fluid. If there is a clinical suspicion for acute cholecystitis, HIDA scan may be pursued for further evaluation.  Small ascites.  Trace left pleural effusion.

## 2021-05-27 NOTE — PROGRESS NOTE ADULT - SUBJECTIVE AND OBJECTIVE BOX
[facility] DIVISION of INFECTIOUS DISEASE  Cheko Gallo MD PhD, Genesis Hare MD, Brianda Alexander MD, Berkley Salgado MD  and providing coverage with Bety Galeas MD and Toby Vang MD  Providing Infectious Disease Consultations at [facility], [facility], [facility]'s    Office# [phone] to schedule follow up appointments  Answering Service for urgent calls or New Consults [phone]  Cell# to text for urgent issues Cheko Gallo [phone]     infectious diseases progress note:    KADY SUAREZ is a 89y y. o. Female patient    Patient with no acute concerns reporting only left shoulder pain    Allergies    No Known Allergies    Intolerances        ANTIBIOTICS/RELEVANT:  antimicrobials  meropenem  IVPB 1000 milliGRAM(s) IV Intermittent every 8 hours    immunologic:    OTHER:  acetaminophen   Tablet .. 650 milliGRAM(s) Oral every 6 hours PRN  aspirin enteric coated 81 milliGRAM(s) Oral daily  dextrose 40% Gel 15 Gram(s) Oral once  dextrose 5%. 1000 milliLiter(s) IV Continuous <Continuous>  dextrose 5%. 1000 milliLiter(s) IV Continuous <Continuous>  dextrose 50% Injectable 25 Gram(s) IV Push once  dextrose 50% Injectable 12.5 Gram(s) IV Push once  dextrose 50% Injectable 25 Gram(s) IV Push once  glucagon  Injectable 1 milliGRAM(s) IntraMuscular once  heparin   Injectable 5000 Unit(s) SubCutaneous every 12 hours  hydrALAZINE Injectable 10 milliGRAM(s) IV Push every 6 hours PRN  insulin lispro (ADMELOG) corrective regimen sliding scale   SubCutaneous three times a day before meals  insulin lispro (ADMELOG) corrective regimen sliding scale   SubCutaneous at bedtime  melatonin 5 milliGRAM(s) Oral at bedtime  morphine  - Injectable 2 milliGRAM(s) IV Push every 6 hours PRN  ondansetron Injectable 4 milliGRAM(s) IV Push every 8 hours PRN  pantoprazole    Tablet 40 milliGRAM(s) Oral before breakfast  polyethylene glycol 3350 17 Gram(s) Oral daily PRN  senna 2 Tablet(s) Oral at bedtime  sucralfate 1 Gram(s) Oral every 12 hours      Objective:  Last 24-Vital Signs Last 24 Hrs  T(C): 36.8 (27 May 2021 12:05), Max: 37.3 (26 May 2021 19:17)  T(F): 98.2 (27 May 2021 12:05), Max: 99.2 (26 May 2021 19:17)  HR: 63 (27 May 2021 12:05) (61 - 74)  BP: 107/63 (27 May 2021 12:05) (107/63 - 189/75)  BP(mean): --  RR: 18 (27 May 2021 12:05) (17 - 18)  SpO2: 97% (27 May 2021 12:05) (94% - 97%)    T(C): 36.8 (21 @ 12:05), Max: 37.3 (21 @ 19:17)  T(F): 98.2 (21 @ 12:05), Max: 99.2 (21 @ 19:17)  T(C): 36.8 (21 @ 12:05), Max: 37.3 (21 @ 19:17)  T(F): 98.2 (21 @ 12:05), Max: 99.2 (21 @ 19:17)  T(C): 36.8 (21 @ 12:05), Max: 37.3 (21 @ 19:17)  T(F): 98.2 (21 @ 12:05), Max: 99.2 (21 @ 19:17)    PHYSICAL EXAM:  Constitutional: Well-developed, well nourished  Eyes: PERRLA, EOMI  Ear/Nose/Throat: oropharynx normal	  Neck: no JVD, no lymphadenopathy, supple  Respiratory: no accessory muscle use, lung fields bilaterally clear  Cardiovascular: distant  Gastrointestinal: soft, NT, no HSM, BS-normal  Extremities: no clubbing, no cyanosis, edema absent, left arm in sling  Neuro: patient alert,   Skin: no sig lesions        LABS:                        11.0   5.36  )-----------( 357      ( 27 May 2021 07:03 )             34.2       WBC 5.36   @ 07:03  WBC 5.58   @ 06:55  WBC 10.10   @ 15:29          139  |  104  |  14  ----------------------------<  94  3.9   |  27  |  0.73    Ca    8.7      27 May 2021 07:03    TPro  6.5  /  Alb  2.7<L>  /  TBili  0.7  /  DBili  x   /  AST  30  /  ALT  34  /  AlkPhos  165<H>        Creatinine, Serum: 0.73 mg/dL (21 @ 07:03)  Creatinine, Serum: 0.69 mg/dL (21 @ 06:55)  Creatinine, Serum: 1.80 mg/dL (21 @ 15:29)        Urinalysis Basic - ( 25 May 2021 20:55 )    Color: Yellow / Appearance: Clear / S.010 / pH: x  Gluc: x / Ketone: Negative  / Bili: Negative / Urobili: Negative   Blood: x / Protein: Negative / Nitrite: Negative   Leuk Esterase: Negative / RBC: x / WBC x   Sq Epi: x / Non Sq Epi: x / Bacteria: x    MICROBIOLOGY:    Culture - Blood (collected 26 May 2021 09:06)  Source: .Blood Blood-Venous  Preliminary Report (27 May 2021 10:01):    No growth to date.    Culture - Blood (collected 26 May 2021 09:06)  Source: .Blood Blood-Peripheral  Preliminary Report (27 May 2021 10:01):    No growth to date.    Culture - Urine (collected 26 May 2021 00:31)  Source: .Urine Clean Catch (Midstream)  Final Report (26 May 2021 22:51):    No growth    Culture - Blood (21 @ 22:07)    -  ESBL: Detec    -  Escherichia coli: Detec    Gram Stain:   Growth in aerobic bottle: Gram Negative Rods    -  Amikacin: S <=16    -  Ampicillin: R >16 These ampicillin results predict results for amoxicillin    -  Ampicillin/Sulbactam: R 8/4 Enterobacter, Citrobacter, and Serratia may develop resistance during prolonged therapy (3-4 days)    -  Aztreonam: R >16    -  Cefazolin: R >16 Enterobacter, Citrobacter, and Serratia may develop resistance during prolonged therapy (3-4 days)    -  Cefepime: R >16    -  Cefoxitin: S <=8    -  Ceftriaxone: R >32 Enterobacter, Citrobacter, and Serratia may develop resistance during prolonged therapy    -  Ciprofloxacin: R >2    -  Ertapenem: S <=0.5    -  Gentamicin: S <=2    -  Imipenem: S <=1    -  Levofloxacin: R >4    -  Meropenem: S <=1    -  Piperacillin/Tazobactam: R <=8    -  Tobramycin: S <=2    -  Trimethoprim/Sulfamethoxazole: S <=0.5/9.5    Specimen Source: .Blood Blood-Peripheral    Organism: Blood Culture PCR    Organism: Escherichia coli ESBL    Culture Results:   Growth in aerobic bottle: Escherichia coli ESBL  MDRO detected in BCID PCR, resistance marker = CTX-M (ESBL)  ***Blood Panel PCR results on this specimen are available  approximately 3 hours after the Gram stain result.***  Gram stain, PCR, and/or culture results may not always  correspond due to difference in methodologies.  ************************************************************  This PCR assay was performed by multiplex PCR. This  Assay tests for 66 bacterial and resistance gene targets.  Please refer to the [facility] [employer] test directory  at [email or web address] for details.    Organism Identification: Blood Culture PCR  Escherichia coli ESBL    Method Type: ALLYSON    Method Type: PCR      RADIOLOGY & ADDITIONAL STUDIES:  < from: NM Hepatobiliary Imaging (21 @ 13:44) >    EXAM:  NM HEPATOBILIARY IMG                            PROCEDURE DATE:  2021          INTERPRETATION:  CLINICAL STATEMENT: 89-year-old female with gallstones and right upper quadrant abdominal pain.    RADIOPHARMACEUTICAL: 3.2 mCi and 3.0 mCiTc-99m-Mebrofenin, I.V.; 2 doses    TECHNIQUE:  Dynamic images of the anterior abdomen were obtained for 90 minutes following injection of radiotracer. Morphine 2 mg I.V. and a second dose of radiotracer were administered at 20 minutes. Dynamic imaging was continued for 1 hour followed by static images of the abdomen in the right lateral and right anterior oblique views immediately thereafter.    FINDINGS: There is prompt, homogeneous uptake of radiotracer by the hepatocytes. Activity is first seen in the bowel at 5 minutes. The gallbladder is not visualized in the first hour of imaging, but was seen immediately after the administration of morphine. There is good clearance of activity from the liver at the end of the study.    IMPRESSION: Normal morphine-augmented hepatobiliary scan.    No evidence of acute cholecystitis.

## 2021-05-27 NOTE — DIETITIAN INITIAL EVALUATION ADULT. - ORAL INTAKE PTA/DIET HISTORY
Per pt's daughter pt with acute decreased appetite and PO intake for a couple days PTA, however normally with good appetite. Pt is a vegetarian does not consume meat, eggs, fish, only accepts dairy (diet office made aware). Family also tries to limit her intake of fried and high sugar foods. Pt with hx of diabetes, on Metformin PTA, no current HgbA1c. Supplement use PTA includes MVI, Vitamin D. NKFA.

## 2021-05-27 NOTE — PROGRESS NOTE ADULT - SUBJECTIVE AND OBJECTIVE BOX
Name: KADY SUAREZ  MRN: [id number]  LOCATION: [facility] 313 W1    ----  Patient is a 89y old  Female who presents with a chief complaint of sepsis (27 May 2021 18:14)      FROM ADMISSION H+P:   HPI:  89 yo F PMHx T2DM, HTN, HLD, GERD, CAD (s/p LAD 2019 with stents placed on DAPT) brought in by Daughter for fever, weakness. Patient states that she fell a week ago and since then she has been having pain in her left arm and left chest. Patient also complaining of constipation, feeling as if she cannot have a bowel movement.     History taken by the daughter who lives with her and is her primary caretaker. Patient had a mechanical fall May 12th in the grocery store.As per the daughter, patient was in the grocery store. Upon leaving the store, the patient seemed to trip and fall on her left side, did not strike her head. Daughter said she was in some pain at the time, and gave her Bengay cream for the left arm pain, no swelling noted at the time. The following day, she found that her mother had severe swelling from her shoulder to her hand, and bruising. Patient was also complaining of severe pain. Daughter brought her to an orthopedist, who she follows for chronic shoulder pain. X rays were done and daughter states that she was sent home. Patient was complaining of severe pain, and inability to  her arm and complaining of left sided rib pain, worse with inhalation. Daughter brought her to [facility] in [place] who prescribed her oxycodone and a five day course of prednisone for the pain. She completed the five day course and was taking oxycodone tablets once a day at bedtime everyday within adequate control of the pain. Yesterday, patient complaining of decreased appetite, appeared more lethargic than usual. Patient was tolerating small bites of crackers, toast. Last night, daughter took her blood pressure and it was 140/70. This morning, daughter checked her temperature which was 101. She gave her tyelenol X 1, which improved the fever. Patient had an appointment to the hand specialist referred to be orthopedist and upon arriving to the office, patient vomited and was not feeling well. Daughter then brought her to the ER instead.    ----  INTERVAL HPI/OVERNIGHT EVENTS: Pt seen and evaluated at the bedside. No acute overnight events occurred. The patient is a limited historian, she declines to use  phone, pt's daughter was present at bedside for my afternoon encounter and we discussed the tx plan with her and e  for our interaction. Pt has no new complaints except minimal MSK type discomfort which is her baseline and she attributes this to lying in bed.     ----  PAST MEDICAL & SURGICAL HISTORY:  Diabetes mellitus    CAD (coronary artery disease)  2 cardiac stents placed    Hypertension    Hyperlipidemia    Bilateral cataracts        FAMILY HISTORY:  FH: MI (myocardial infarction) (Child)        Allergies    No Known Allergies    Intolerances        ----  ANTIMICROBIALS:  meropenem  IVPB 1000 milliGRAM(s) IV Intermittent every 8 hours    CARDIOVASCULAR:  hydrALAZINE Injectable 10 milliGRAM(s) IV Push every 6 hours PRN    GASTROINTESTINAL:  pantoprazole    Tablet 40 milliGRAM(s) Oral before breakfast  polyethylene glycol 3350 17 Gram(s) Oral daily PRN  senna 2 Tablet(s) Oral at bedtime  sucralfate 1 Gram(s) Oral every 12 hours    PULMONARY:      ----  REVIEW OF SYSTEMS:  CONSTITUTIONAL: denies fever, chills, fatigue, weakness  HEENT: denies blurred vision, sore throat  CARDIOVASCULAR: denies chest pain, chest pressure, palpitations  RESPIRATORY: denies shortness of breath, sputum production  GASTROINTESTINAL: denies nausea, vomiting, diarrhea, abdominal pain  : denies dysuria or urinary frequency  NEUROLOGICAL: denies numbness, headache, focal weakness  MUSCULOSKELETAL: denies new joint pain, muscle aches    ----  PHYSICAL EXAM:  GENERAL: patient appears well, no acute distress  ENMT: oropharynx clear without erythema, moist mucous membranes  LUNGS: good air entry bilaterally, clear to auscultation, symmetric breath sounds, no wheezing or rhonchi appreciated  HEART: soft S1/S2, regular rate and rhythm, no murmurs noted, no noted edema to b/l LE  GASTROINTESTINAL: abdomen is soft, nontender, nondistended, normoactive bowel sounds, no palpable masses  MUSCULOSKELETAL: no clubbing or cyanosis, no obvious deformity  NEUROLOGIC: awake, alert, readily interactive, good muscle tone in 4 extremities    T(C): 36.8 (05-27-21 @ 20:15), Max: 36.9 (05-27-21 @ 06:12)  HR: 78 (05-27-21 @ 20:15) (63 - 78)  BP: 105/57 (05-27-21 @ 20:15) (105/57 - 169/76)  RR: 18 (05-27-21 @ 20:15) (18 - 18)  SpO2: 96% (05-27-21 @ 20:15) (95% - 97%)  Wt(kg): --    ----  INTAKE & OUTPUT:  I&O's Summary    26 May 2021 07:01  -  27 May 2021 07:00  --------------------------------------------------------  IN: 350 mL / OUT: 0 mL / NET: 350 mL    27 May 2021 07:01  -  27 May 2021 22:11  --------------------------------------------------------  IN: 760 mL / OUT: 1000 mL / NET: -240 mL        LABS:                        11.0   5.36  )-----------( 357      ( 27 May 2021 07:03 )             34.2     05-27    139  |  104  |  14  ----------------------------<  94  3.9   |  27  |  0.73    Ca    8.7      27 May 2021 07:03    TPro  6.5  /  Alb  2.7<L>  /  TBili  0.7  /  DBili  x   /  AST  30  /  ALT  34  /  AlkPhos  165<H>  05-27        CAPILLARY BLOOD GLUCOSE      POCT Blood Glucose.: 130 mg/dL (27 May 2021 21:02)  POCT Blood Glucose.: 110 mg/dL (27 May 2021 16:59)  POCT Blood Glucose.: 142 mg/dL (27 May 2021 11:54)  POCT Blood Glucose.: 97 mg/dL (27 May 2021 07:32)        Culture - Blood (collected 05-26-21 @ 09:06)  Source: .Blood Blood-Venous  Preliminary Report (05-27-21 @ 10:01):    No growth to date.    Culture - Blood (collected 05-26-21 @ 09:06)  Source: .Blood Blood-Peripheral  Preliminary Report (05-27-21 @ 10:01):    No growth to date.    Culture - Urine (collected 05-26-21 @ 00:31)  Source: .Urine Clean Catch (Midstream)  Final Report (05-26-21 @ 22:51):    No growth

## 2021-05-27 NOTE — PROGRESS NOTE ADULT - ASSESSMENT
88 yo F PMH T2DM, HTN, HLD, GERD, CAD (s/p LAD/D1 stents, placed on DAPT) admitted for sepsis likely due to intra-abdominal pathology, possible cholangitis given CBD dilation and elevated Tbili and LFTs. Cardiology consulted for medical clearance for possible ERCP.    CAD s/p 2 stents placed in 2015 of the LAD/D1, on DAPT   - Patient is not complaining of any cardiac symptoms at this time.  - EKG: sinus bradycardia HR: 53, no signs of acute ischemia, no changes compared to previous EKG (EKG in 2019 was sinus bradycardia HR: 54)  - No clear evidence of acute ischemia, trops negative x 1.   - No meaningful evidence of volume overload.  - No previous echo on record.   - Recent cath in 2019-Nonobstructive CAD, patent stents, EF: 65%   - Discontinue Plavix as patient no longer needs dual antiplatelet therapy.    - Continue Aspirin    HTN  - BP: 169/76 (05-27-21 @ 06:12) (128/66 - 189/75)  - Elevation likely reactive related to pain and not getting her home medications.    - Hydralazine 10mg IVP q6H prn for SBP >140.    - BP now elevated given that BP meds were held due to hypotension on admission and pt currently being on fluids    - Would restart BP medications as needed, and start with atenolol so as to avoid withdrawal tachycardia   - Monitor routine hemodynamics    HLD  - Continue home statin when LFTs stabilize    Dilated CBD  - MRCP with dilated CBD   - elevated LFTs now downtrending  - continue ABX as per ID for Bld CX + GNR  - Follow GI recommendations.     S/p Fall with Fractured L humerus  - Ortho following  - Pain control  - I/S     - Monitor and replete lytes, keep K>4, Mg>2.  - All other medical needs as per primary team.  - Other cardiovascular workup will depend on clinical course.  - Will continue to follow.    Qiana Javed, MS FNP, [facility]  Nurse Practitioner- Cardiology   Spectra #[phone] 89 yo F PMH T2DM, HTN, HLD, GERD, CAD (s/p LAD/D1 stents, placed on DAPT) admitted for sepsis likely due to intra-abdominal pathology, possible cholangitis given CBD dilation and elevated Tbili and LFTs. Cardiology consulted for medical clearance for possible ERCP.    CAD s/p 2 stents placed in 2015 of the LAD/D1, on DAPT   - Patient is not complaining of any cardiac symptoms at this time.  - EKG: sinus bradycardia HR: 53, no signs of acute ischemia, no changes compared to previous EKG (EKG in 2019 was sinus bradycardia HR: 54)  - No clear evidence of acute ischemia, trops negative x 1.   - No meaningful evidence of volume overload.  - No previous echo on record.   - Recent cath in 2019-Nonobstructive CAD, patent stents, EF: 65%   - Discontinue Plavix as patient no longer needs dual antiplatelet therapy.    - Continue Aspirin    HTN  - BP: 169/76 (05-27-21 @ 06:12) (128/66 - 189/75)  - Elevation likely reactive related to pain and not getting her home medications.    - Hydralazine 10mg IVP q6H prn for SBP >140.    - BP now elevated given that BP meds were held due to hypotension on admission   - Would restart BP medications as needed, and start with atenolol so as to avoid withdrawal tachycardia   - Monitor routine hemodynamics    HLD  - Continue home statin when LFTs stabilize    Dilated CBD  - MRCP with dilated CBD   - elevated LFTs now downtrending  - continue ABX as per ID for Bld CX + GNR  - Follow GI recommendations.     S/p Fall with Fractured L humerus  - Ortho following  - Pain control  - I/S     - Monitor and replete lytes, keep K>4, Mg>2.  - All other medical needs as per primary team.  - Other cardiovascular workup will depend on clinical course.  - Will continue to follow.    Qiana Javed, MS FNP, [facility]  Nurse Practitioner- Cardiology   Spectra #[phone]

## 2021-05-27 NOTE — DIETITIAN INITIAL EVALUATION ADULT. - PROBLEM SELECTOR PLAN 8
history of Left heart cath 2019, s/p LAD/ D1 stent in 3/2015, on ASA 81/Plavix at home   - hold plavix for now  - continue asa 81 mg daily  - patient has no substernal chest pain and low suspicion for ACS at this time   - troponin negative, no need to trend cardiac enzymes   - cardio [facility] consulted for clearance for possible ERCP; on DAPT

## 2021-05-28 LAB
ANION GAP SERPL CALC-SCNC: 5 MMOL/L — SIGNIFICANT CHANGE UP (ref 5–17)
BUN SERPL-MCNC: 16 MG/DL — SIGNIFICANT CHANGE UP (ref 7–23)
CALCIUM SERPL-MCNC: 8.7 MG/DL — SIGNIFICANT CHANGE UP (ref 8.5–10.1)
CHLORIDE SERPL-SCNC: 106 MMOL/L — SIGNIFICANT CHANGE UP (ref 96–108)
CO2 SERPL-SCNC: 30 MMOL/L — SIGNIFICANT CHANGE UP (ref 22–31)
CREAT SERPL-MCNC: 0.73 MG/DL — SIGNIFICANT CHANGE UP (ref 0.5–1.3)
GLUCOSE SERPL-MCNC: 103 MG/DL — HIGH (ref 70–99)
HCT VFR BLD CALC: 33.9 % — LOW (ref 34.5–45)
HGB BLD-MCNC: 10.7 G/DL — LOW (ref 11.5–15.5)
MAGNESIUM SERPL-MCNC: 2.1 MG/DL — SIGNIFICANT CHANGE UP (ref 1.6–2.6)
MCHC RBC-ENTMCNC: 29.9 PG — SIGNIFICANT CHANGE UP (ref 27–34)
MCHC RBC-ENTMCNC: 31.6 GM/DL — LOW (ref 32–36)
MCV RBC AUTO: 94.7 FL — SIGNIFICANT CHANGE UP (ref 80–100)
NRBC # BLD: 0 /100 WBCS — SIGNIFICANT CHANGE UP (ref 0–0)
PLATELET # BLD AUTO: 327 K/UL — SIGNIFICANT CHANGE UP (ref 150–400)
POTASSIUM SERPL-MCNC: 4.1 MMOL/L — SIGNIFICANT CHANGE UP (ref 3.5–5.3)
POTASSIUM SERPL-SCNC: 4.1 MMOL/L — SIGNIFICANT CHANGE UP (ref 3.5–5.3)
RBC # BLD: 3.58 M/UL — LOW (ref 3.8–5.2)
RBC # FLD: 12.4 % — SIGNIFICANT CHANGE UP (ref 10.3–14.5)
SODIUM SERPL-SCNC: 141 MMOL/L — SIGNIFICANT CHANGE UP (ref 135–145)
WBC # BLD: 5.16 K/UL — SIGNIFICANT CHANGE UP (ref 3.8–10.5)
WBC # FLD AUTO: 5.16 K/UL — SIGNIFICANT CHANGE UP (ref 3.8–10.5)

## 2021-05-28 PROCEDURE — 99232 SBSQ HOSP IP/OBS MODERATE 35: CPT

## 2021-05-28 PROCEDURE — 99233 SBSQ HOSP IP/OBS HIGH 50: CPT

## 2021-05-28 RX ORDER — CLOPIDOGREL BISULFATE 75 MG/1
75 TABLET, FILM COATED ORAL DAILY
Refills: 0 | Status: DISCONTINUED | OUTPATIENT
Start: 2021-05-29 | End: 2021-05-29

## 2021-05-28 RX ORDER — ATENOLOL 25 MG/1
25 TABLET ORAL DAILY
Refills: 0 | Status: DISCONTINUED | OUTPATIENT
Start: 2021-05-28 | End: 2021-06-01

## 2021-05-28 RX ORDER — ISOSORBIDE MONONITRATE 60 MG/1
30 TABLET, EXTENDED RELEASE ORAL DAILY
Refills: 0 | Status: DISCONTINUED | OUTPATIENT
Start: 2021-05-28 | End: 2021-06-01

## 2021-05-28 RX ADMIN — Medication 81 MILLIGRAM(S): at 12:02

## 2021-05-28 RX ADMIN — Medication 1 GRAM(S): at 17:57

## 2021-05-28 RX ADMIN — MEROPENEM 100 MILLIGRAM(S): 1 INJECTION INTRAVENOUS at 12:42

## 2021-05-28 RX ADMIN — HEPARIN SODIUM 5000 UNIT(S): 5000 INJECTION INTRAVENOUS; SUBCUTANEOUS at 17:55

## 2021-05-28 RX ADMIN — MEROPENEM 100 MILLIGRAM(S): 1 INJECTION INTRAVENOUS at 05:12

## 2021-05-28 RX ADMIN — HEPARIN SODIUM 5000 UNIT(S): 5000 INJECTION INTRAVENOUS; SUBCUTANEOUS at 05:12

## 2021-05-28 RX ADMIN — Medication 1 GRAM(S): at 05:11

## 2021-05-28 RX ADMIN — PANTOPRAZOLE SODIUM 40 MILLIGRAM(S): 20 TABLET, DELAYED RELEASE ORAL at 05:12

## 2021-05-28 RX ADMIN — Medication 5 MILLIGRAM(S): at 21:13

## 2021-05-28 RX ADMIN — MEROPENEM 100 MILLIGRAM(S): 1 INJECTION INTRAVENOUS at 21:13

## 2021-05-28 RX ADMIN — SENNA PLUS 2 TABLET(S): 8.6 TABLET ORAL at 21:13

## 2021-05-28 NOTE — PROGRESS NOTE ADULT - SUBJECTIVE AND OBJECTIVE BOX
[facility] -- Marsha Agarwal Grossman, [place], Damian Lemus Savella, [place]: Office # [phone]    Follow Up:  CAD - Pre and Postop Cardiac Optimization     Subjective/Observations: Patient seen and examined. Patient awake, alert, resting comfortably in bed. No complaints of chest pain, dyspnea, palpitations or dizziness. Tolerating room air. Left arm in sling    REVIEW OF SYSTEMS: All review of systems is negative for eye, ENT, GI, , allergic, dermatologic, musculoskeletal and neurologic except as described above    PAST MEDICAL & SURGICAL HISTORY:  Diabetes mellitus    CAD (coronary artery disease)  2 cardiac stents placed    Hypertension    Hyperlipidemia    Bilateral cataracts        MEDICATIONS  (STANDING):  aspirin enteric coated 81 milliGRAM(s) Oral daily  dextrose 40% Gel 15 Gram(s) Oral once  dextrose 5%. 1000 milliLiter(s) (50 mL/Hr) IV Continuous <Continuous>  dextrose 5%. 1000 milliLiter(s) (100 mL/Hr) IV Continuous <Continuous>  dextrose 50% Injectable 25 Gram(s) IV Push once  dextrose 50% Injectable 12.5 Gram(s) IV Push once  dextrose 50% Injectable 25 Gram(s) IV Push once  glucagon  Injectable 1 milliGRAM(s) IntraMuscular once  heparin   Injectable 5000 Unit(s) SubCutaneous every 12 hours  insulin lispro (ADMELOG) corrective regimen sliding scale   SubCutaneous three times a day before meals  insulin lispro (ADMELOG) corrective regimen sliding scale   SubCutaneous at bedtime  melatonin 5 milliGRAM(s) Oral at bedtime  meropenem  IVPB 1000 milliGRAM(s) IV Intermittent every 8 hours  pantoprazole    Tablet 40 milliGRAM(s) Oral before breakfast  senna 2 Tablet(s) Oral at bedtime  sucralfate 1 Gram(s) Oral every 12 hours    MEDICATIONS  (PRN):  acetaminophen   Tablet .. 650 milliGRAM(s) Oral every 6 hours PRN Mild Pain (1 - 3)  hydrALAZINE Injectable 10 milliGRAM(s) IV Push every 6 hours PRN For sbp>140  morphine  - Injectable 2 milliGRAM(s) IV Push every 6 hours PRN Severe Pain (7 - 10)  ondansetron Injectable 4 milliGRAM(s) IV Push every 8 hours PRN Nausea and/or Vomiting  polyethylene glycol 3350 17 Gram(s) Oral daily PRN Constipation    Allergies    No Known Allergies    Intolerances      Vital Signs Last 24 Hrs  T(C): 36.6 (28 May 2021 05:09), Max: 36.8 (27 May 2021 12:05)  T(F): 97.8 (28 May 2021 05:09), Max: 98.2 (27 May 2021 12:05)  HR: 60 (28 May 2021 05:09) (60 - 78)  BP: 171/84 (28 May 2021 05:09) (105/57 - 171/84)  BP(mean): --  RR: 18 (28 May 2021 05:09) (18 - 18)  SpO2: 95% (28 May 2021 05:09) (95% - 97%)  I&O's Summary    27 May 2021 07:01  -  28 May 2021 07:00  --------------------------------------------------------  IN: 810 mL / OUT: 1300 mL / NET: -490 mL          TELE: Not on telemetry   PHYSICAL EXAM:  Appearance: NAD, no distress, alert, Well developed   HEENT: Moist Mucous Membranes, Anicteric  Cardiovascular: Regular rate and rhythm, Normal S1 S2, No JVD, No murmurs, No rubs, gallops or clicks  Respiratory: Non-labored, Clear to auscultation, No rales, No rhonchi, No wheezing.   Gastrointestinal:  Soft, Non-tender, + BS  Neurologic: Non-focal  Skin: Warm and dry, No visible rashes or ulcers, No ecchymosis, No cyanosis  Musculoskeletal: No clubbing, No cyanosis, LUE + swelling/tenderness  in sling   Psychiatry: Mood & affect appropriate  Lymph: No peripheral edema.     LABS: All Labs Reviewed:                        10.7   5.16  )-----------( 327      ( 28 May 2021 06:59 )             33.9                         11.0   5.36  )-----------( 357      ( 27 May 2021 07:03 )             34.2                         10.6   5.58  )-----------( 308      ( 26 May 2021 06:55 )             32.4     28 May 2021 06:59    141    |  106    |  16     ----------------------------<  103    4.1     |  30     |  0.73   27 May 2021 07:03    139    |  104    |  14     ----------------------------<  94     3.9     |  27     |  0.73   26 May 2021 06:55    139    |  105    |  13     ----------------------------<  99     3.7     |  26     |  0.69     Ca    8.7        28 May 2021 06:59  Ca    8.7        27 May 2021 07:03  Ca    8.4        26 May 2021 06:55  Mg     2.1       28 May 2021 06:59    TPro  6.5    /  Alb  2.7    /  TBili  0.7    /  DBili  x      /  AST  30     /  ALT  34     /  AlkPhos  165    27 May 2021 07:03  TPro  6.3    /  Alb  2.7    /  TBili  0.8    /  DBili  x      /  AST  39     /  ALT  44     /  AlkPhos  159    26 May 2021 06:55      Troponin I, Serum: .034 ng/mL (05-24-21 @ 15:29)    12 Lead ECG:   Ventricular Rate 53 BPM  Atrial Rate 53 BPM  P-R Interval 146 ms  QRS Duration 64 ms  Q-T Interval 420 ms  QTC Calculation(Bazett) 394 ms  P Axis 28 degrees  R Axis 27 degrees  T Axis 49 degrees  Diagnosis Line Sinus bradycardia with marked sinus arrhythmia  Otherwise normal ECG  No previous ECGs available  Confirmed by Cheko Mendez MD ([id number]) on 5/25/2021 1:36:03 PM (05-24-21 @ 14:58)    < from: Cardiac Cath Lab - Adult (07.10.19 @ 09:44) >   Cardiac Arteries and Lesion Findings  LMCA: Normal.  LAD: Diffuse irregularity.Widely patent stent, in LAD and Diagonal branch  There is a 40% stenosis in diagonal branch proximal to the stent  LCx: Diffuse irregularity.  RCA: Diffuse irregularity.   Impression   Diagnostic Conclusions   Non-Obstructive CAD.   Patent stents in LAD and Diagonal branch.   Normal LV systolic function. Estimated LV ejection fraction is 65 %.   Recommendations   Aggressive medical management of coronary artery disease and its   underlying risk factors.   Manage with medical therapy.  < end of copied text >    < from: MR MRCP No Cont (05.25.21 @ 10:32) >  IMPRESSION:  Dilated common bile duct measuring 1.4 cm in diameter with gradual tapering distally. No evidence for choledocholithiasis.  Pancreas divisum.  Gallstones in the gallbladder. No evidence for thickened gallbladder wall. Small pericholecystic fluid. If there is a clinical suspicion for acute cholecystitis, HIDA scan may be pursued for further evaluation.  Small ascites.  Trace left pleural effusion.  < end of copied text >

## 2021-05-28 NOTE — PROGRESS NOTE ADULT - TIME BILLING
Family does not feel that home w/ IV abx is manageable but she will consider the options.   Plan for inpatient management until completing course of IV abx

## 2021-05-28 NOTE — PROGRESS NOTE ADULT - SUBJECTIVE AND OBJECTIVE BOX
[facility] DIVISION of INFECTIOUS DISEASE  Cheko Gallo MD PhD, Genesis Hare MD, Brianda Alexander MD, Berkley Salgado MD  and providing coverage with Bety Galeas MD and Toby Vang MD  Providing Infectious Disease Consultations at [facility], [facility], [facility]'s    Office# [phone] to schedule follow up appointments  Answering Service for urgent calls or New Consults [phone]  Cell# to text for urgent issues Cheko Gallo [phone]     infectious diseases progress note:    KADY SUAREZ is a 89y y. o. Female patient    No concerning overnight events    Allergies    No Known Allergies    Intolerances        ANTIBIOTICS/RELEVANT:  antimicrobials  meropenem  IVPB 1000 milliGRAM(s) IV Intermittent every 8 hours    immunologic:    OTHER:  acetaminophen   Tablet .. 650 milliGRAM(s) Oral every 6 hours PRN  aspirin enteric coated 81 milliGRAM(s) Oral daily  dextrose 40% Gel 15 Gram(s) Oral once  dextrose 5%. 1000 milliLiter(s) IV Continuous <Continuous>  dextrose 5%. 1000 milliLiter(s) IV Continuous <Continuous>  dextrose 50% Injectable 25 Gram(s) IV Push once  dextrose 50% Injectable 12.5 Gram(s) IV Push once  dextrose 50% Injectable 25 Gram(s) IV Push once  glucagon  Injectable 1 milliGRAM(s) IntraMuscular once  heparin   Injectable 5000 Unit(s) SubCutaneous every 12 hours  hydrALAZINE Injectable 10 milliGRAM(s) IV Push every 6 hours PRN  insulin lispro (ADMELOG) corrective regimen sliding scale   SubCutaneous three times a day before meals  insulin lispro (ADMELOG) corrective regimen sliding scale   SubCutaneous at bedtime  melatonin 5 milliGRAM(s) Oral at bedtime  morphine  - Injectable 2 milliGRAM(s) IV Push every 6 hours PRN  ondansetron Injectable 4 milliGRAM(s) IV Push every 8 hours PRN  pantoprazole    Tablet 40 milliGRAM(s) Oral before breakfast  polyethylene glycol 3350 17 Gram(s) Oral daily PRN  senna 2 Tablet(s) Oral at bedtime  sucralfate 1 Gram(s) Oral every 12 hours      Objective:  Vital Signs Last 24 Hrs  T(C): 36.6 (28 May 2021 05:09), Max: 36.8 (27 May 2021 20:15)  T(F): 97.8 (28 May 2021 05:09), Max: 98.2 (27 May 2021 20:15)  HR: 60 (28 May 2021 05:09) (60 - 78)  BP: 171/84 (28 May 2021 05:09) (105/57 - 171/84)  BP(mean): --  RR: 18 (28 May 2021 05:09) (18 - 18)  SpO2: 95% (28 May 2021 05:09) (95% - 96%)    T(C): 36.6 (05-28-21 @ 05:09), Max: 37.3 (05-26-21 @ 19:17)  T(C): 36.6 (05-28-21 @ 05:09), Max: 37.3 (05-26-21 @ 19:17)  T(C): 36.6 (05-28-21 @ 05:09), Max: 37.3 (05-26-21 @ 19:17)    PHYSICAL EXAM:  HEENT: NC atraumatic  Neck: supple  Respiratory: no accessory muscle use, breathing comfortably  Cardiovascular: distant  Gastrointestinal: normal appearing, nondistended  Extremities: no clubbing, no cyanosis,        LABS:                          10.7   5.16  )-----------( 327      ( 28 May 2021 06:59 )             33.9       5.16 05-28 @ 06:59  5.36 05-27 @ 07:03  5.58 05-26 @ 06:55  10.10 05-24 @ 15:29      05-28    141  |  106  |  16  ----------------------------<  103<H>  4.1   |  30  |  0.73    Ca    8.7      28 May 2021 06:59  Mg     2.1     05-28    TPro  6.5  /  Alb  2.7<L>  /  TBili  0.7  /  DBili  x   /  AST  30  /  ALT  34  /  AlkPhos  165<H>  05-27      Creatinine, Serum: 0.73 mg/dL (05-28-21 @ 06:59)  Creatinine, Serum: 0.73 mg/dL (05-27-21 @ 07:03)  Creatinine, Serum: 0.69 mg/dL (05-26-21 @ 06:55)  Creatinine, Serum: 1.80 mg/dL (05-24-21 @ 15:29)                INFLAMMATORY MARKERS  Auto Neutrophil #: 3.66 K/uL (05-26-21 @ 06:55)  Auto Lymphocyte #: 0.95 K/uL (05-26-21 @ 06:55)  Auto Neutrophil #: 8.30 K/uL (05-24-21 @ 15:29)  Auto Lymphocyte #: 0.68 K/uL (05-24-21 @ 15:29)    Lactate, Blood: 1.1 mmol/L (05-24-21 @ 21:02)  Lactate, Blood: 2.5 mmol/L (05-24-21 @ 15:29)    Auto Eosinophil #: 0.12 K/uL (05-26-21 @ 06:55)  Auto Eosinophil #: 0.02 K/uL (05-24-21 @ 15:29)          Troponin I, Serum: .034 ng/mL (05-24-21 @ 15:29)                Activated Partial Thromboplastin Time: 26.6 sec (05-24-21 @ 15:29)  INR: 1.17 ratio (05-24-21 @ 15:29)          MICROBIOLOGY:              RADIOLOGY & ADDITIONAL STUDIES:

## 2021-05-28 NOTE — PROGRESS NOTE ADULT - ASSESSMENT
89 yo F PMH T2DM, HTN, HLD, GERD, CAD (s/p LAD/D1 stents, placed on DAPT) admitted for sepsis likely due to intra-abdominal pathology, possible cholangitis given CBD dilation and elevated Tbili and LFTs. Cardiology consulted for medical clearance for possible ERCP.    CAD s/p 2 stents placed in 2015 of the LAD/D1, on DAPT   - Patient is not complaining of any cardiac symptoms at this time.  - EKG: sinus bradycardia HR: 53, no signs of acute ischemia, no changes compared to previous EKG (EKG in 2019 was sinus bradycardia HR: 54)  - No clear evidence of acute ischemia, trops negative x 1.   - No meaningful evidence of volume overload.  - No previous echo on record.   - Recent cath in 2019-Nonobstructive CAD, patent stents, EF: 65%   - Discontinue Plavix as patient no longer needs dual antiplatelet therapy.    - Continue Aspirin    HTN  - BP: 171/84 (05-28-21 @ 05:09) (105/57 - 171/84)  - Elevation likely reactive related to pain and taken prior to morning Hydralazine  - Hydralazine 10mg IVP q6H prn for SBP >140.    - BP now elevated given that BP meds were held due to hypotension on admission   - Resume BB today  - Would restart BP medications as tolerated  - Monitor routine hemodynamics    HLD  - Continue home statin when LFTs stabilize    Dilated CBD  - MRCP with dilated CBD   - elevated LFTs now downtrending  - continue ABX as per ID for Bld CX + GNR  - Follow GI recommendations.     S/p Fall with Fractured L humerus  - Ortho following  - Pain control  - I/S     - Monitor and replete lytes, keep K>4, Mg>2.  - All other medical needs as per primary team.  - Other cardiovascular workup will depend on clinical course.  - Will continue to follow.    Yvonne Simon, MS ANP, AGAP  Nurse Practitioner- Cardiology   Spectra #[phone]

## 2021-05-28 NOTE — PROGRESS NOTE ADULT - ASSESSMENT
89 yo F PMHx T2DM, HTN, HLD, GERD, CAD (s/p LAD 2019 with stents placed on DAPT) brought in by Daughter for fever, weakness, vomiting and brought to ER. Noted to have left distal humerus fracture, ESBL E coli bacteremia and dilated biliary ducts with cholelithiasis and elevated AST/ALT: 107/97, alkphos: 180, total bilirubin: 1.3    RECOMMENDATIONS    ESBL E coli bacteremia with most compelling localization being the biliary system with dilated ducts and cholelithiasis, agree with MR CHOLANGIOPANCREATOGRAPHY, read pending. Exam is uncharacteristic with no RUQ pain, negative Mittal's sign but noted biochemical support as well as imaging. With concerns for polymicrobial infection and ESBL isolated in blood  5/26-escalated to meropenem, HIDA neg so suspect passed stone, 5/26-blood cultures -NGTD  -recommend 6/1 as last day of IV abx    Thank you for consulting us and involving us in the management of this most interesting and challenging case.  Please call us at [phone] or text me directly on my cell#[phone] with any concerns or further questions.

## 2021-05-28 NOTE — PROGRESS NOTE ADULT - SUBJECTIVE AND OBJECTIVE BOX
INTERVAL HPI/OVERNIGHT EVENTS:  pt seen and examined   No acute events overnight as per nursing staff     MEDICATIONS  (STANDING):  aspirin enteric coated 81 milliGRAM(s) Oral daily  dextrose 40% Gel 15 Gram(s) Oral once  dextrose 5%. 1000 milliLiter(s) (50 mL/Hr) IV Continuous <Continuous>  dextrose 5%. 1000 milliLiter(s) (100 mL/Hr) IV Continuous <Continuous>  dextrose 50% Injectable 25 Gram(s) IV Push once  dextrose 50% Injectable 12.5 Gram(s) IV Push once  dextrose 50% Injectable 25 Gram(s) IV Push once  glucagon  Injectable 1 milliGRAM(s) IntraMuscular once  heparin   Injectable 5000 Unit(s) SubCutaneous every 12 hours  insulin lispro (ADMELOG) corrective regimen sliding scale   SubCutaneous every 6 hours  melatonin 5 milliGRAM(s) Oral at bedtime  meropenem  IVPB 500 milliGRAM(s) IV Intermittent every 12 hours  pantoprazole    Tablet 40 milliGRAM(s) Oral before breakfast  senna 2 Tablet(s) Oral at bedtime  sucralfate 1 Gram(s) Oral every 12 hours    MEDICATIONS  (PRN):  acetaminophen   Tablet .. 650 milliGRAM(s) Oral every 6 hours PRN Mild Pain (1 - 3)  hydrALAZINE Injectable 10 milliGRAM(s) IV Push every 6 hours PRN For sbp>140  morphine  - Injectable 2 milliGRAM(s) IV Push every 6 hours PRN Severe Pain (7 - 10)  ondansetron Injectable 4 milliGRAM(s) IV Push every 8 hours PRN Nausea and/or Vomiting  polyethylene glycol 3350 17 Gram(s) Oral daily PRN Constipation      Allergies  No Known Allergies    Intolerances    ROS:  General:  No wt loss, + fevers, chills, night sweats, fatigue,   Eyes:  Good vision, no reported pain  ENT:  No sore throat, pain, runny nose, dysphagia  CV:  No pain, palpitations, hypo/hypertension  Resp:  No dyspnea, cough, tachypnea, wheezing  GI:  No pain, No nausea, No vomiting, No diarrhea, No constipation, No weight loss, No fever, No pruritis, No rectal bleeding, No tarry stools, No dysphagia,  :  No pain, bleeding, incontinence, nocturia  Muscle:  No pain, weakness  Neuro:  No weakness, tingling, memory problems  Psych:  No fatigue, insomnia, mood problems, depression  Endocrine:  No polyuria, polydipsia, cold/heat intolerance  Heme:  No petechiae, ecchymosis, easy bruisability  Skin:  No rash, tattoos, scars, edema      Vital Signs Last 24 Hrs  T(C): 36.6 (26 May 2021 08:10), Max: 36.8 (25 May 2021 13:40)  T(F): 97.8 (26 May 2021 08:10), Max: 98.3 (25 May 2021 13:40)  HR: 66 (26 May 2021 08:10) (59 - 66)  BP: 166/72 (26 May 2021 08:10) (160/72 - 189/79)  BP(mean): --  RR: 18 (26 May 2021 08:10) (17 - 18)  SpO2: 97% (26 May 2021 08:10) (94% - 97%)    GENERAL:  Appears stated age,   HEENT:  NC/AT,    CHEST:  Full & symmetric excursion,   HEART:  Regular rhythm  ABDOMEN:  Soft, non-tender, non-distended,   EXTEREMITIES:  no cyanosis,clubbing or edema  SKIN:  No rash  NEURO:  Alert,          LABS:                        10.6   5.58  )-----------( 308      ( 26 May 2021 06:55 )             32.4         139  |  105  |  13  ----------------------------<  99  3.7   |  26  |  0.69    Ca    8.4<L>      26 May 2021 06:55    TPro  6.3  /  Alb  2.7<L>  /  TBili  0.8  /  DBili  x   /  AST  39<H>  /  ALT  44  /  AlkPhos  159<H>      PT/INR - ( 24 May 2021 15:29 )   PT: 13.6 sec;   INR: 1.17 ratio         PTT - ( 24 May 2021 15:29 )  PTT:26.6 sec  Urinalysis Basic - ( 25 May 2021 20:55 )    Color: Yellow / Appearance: Clear / S.010 / pH: x  Gluc: x / Ketone: Negative  / Bili: Negative / Urobili: Negative   Blood: x / Protein: Negative / Nitrite: Negative   Leuk Esterase: Negative / RBC: x / WBC x   Sq Epi: x / Non Sq Epi: x / Bacteria: x      Culture - Blood (collected 24 May 2021 22:07)  Source: .Blood Blood-Peripheral  Gram Stain (25 May 2021 10:05):    Growth in aerobic bottle: Gram Negative Rods  Preliminary Report (25 May 2021 11:07):    Growth in aerobic bottle: Gram Negative Rods    MDRO detected in BCID PCR, resistance marker = CTX-M (ESBL)    ***Blood Panel PCR results on this specimen are available    approximately 3 hours after the Gram stain result.***    Gram stain, PCR, and/or culture results may not always    correspond due to difference in methodologies.    ************************************************************    This PCR assay was performed by multiplex PCR. This    Assay tests for 66 bacterial and resistance gene targets.    Please refer to the [employer] test directory    at [email or web address] for details.  Organism: Blood Culture PCR (25 May 2021 11:07)  Organism: Blood Culture PCR (25 May 2021 11:07)    Culture - Blood (collected 24 May 2021 22:07)  Source: .Blood Blood-Peripheral  Preliminary Report (25 May 2021 23:01):    No growth to date.        RADIOLOGY & ADDITIONAL TESTS:  EXAM:  MR MRCP                        PROCEDURE DATE:  2021    INTERPRETATION:  CLINICAL INFORMATION: Transaminitis and sepsis. Dilated common bile duct.  COMPARISON: No prior abdominal MR is available for comparison. Reference is made with a previous abdominal/pelvic CT dated 2021  CONTRAST/COMPLICATIONS:  IV Contrast: NONE  Oral Contrast: NONE  Complications: None reported at time of study completion    PROCEDURE:  MRI/MRCP was performed. Radial and 3D MRCP sequences were obtained.    FINDINGS:  LOWER CHEST: Trace left pleural effusion.  LIVER: Within normal limits.  BILE DUCTS: The common bile duct is dilated measuring 1.4 cm in diameter with gradual tapering distally. No evidence for choledocholithiasis.  GALLBLADDER: Gallstones in the gallbladder. No evidence for thickened gallbladder wall. Small pericholecystic fluid. If there is a clinical suspicion for acute cholecystitis, HIDA scan may be pursued for further evaluation.  SPLEEN: Within normal limits.  PANCREAS: No pancreatic mass is identified. Pancreas divisum.  ADRENALS: Within normal limits.  KIDNEYS/URETERS: Small brightly T2 hyperintense lesions in the kidneys bilaterally, representing probable cysts.    VISUALIZED PORTIONS:  BOWEL: Within normal limits.  PERITONEUM: Small ascites.  VESSELS: Within normal limits.  RETROPERITONEUM/LYMPH NODES: No lymphadenopathy.  ABDOMINAL WALL: Within normal limits.  BONES: Degenerative spondylosis.    IMPRESSION:  Dilated common bile duct measuring 1.4 cm in diameter with gradual tapering distally. No evidence for choledocholithiasis.  Pancreas divisum.  Gallstones in the gallbladder. No evidence for thickened gallbladder wall. Small pericholecystic fluid. If there is a clinical suspicion for acute cholecystitis, HIDA scan may be pursued for further evaluation.  Small ascites.  Trace left pleural effusion.

## 2021-05-28 NOTE — PROGRESS NOTE ADULT - SUBJECTIVE AND OBJECTIVE BOX
Name: KADY SUAREZ  MRN: [id number]  LOCATION: [facility] [address]    ----  Patient is a 89y old  Female who presents with a chief complaint of sepsis (28 May 2021 12:21)      FROM ADMISSION H+P:   HPI:  89 yo F PMHx T2DM, HTN, HLD, GERD, CAD (s/p LAD 2019 with stents placed on DAPT) brought in by Daughter for fever, weakness. Patient states that she fell a week ago and since then she has been having pain in her left arm and left chest. Patient also complaining of constipation, feeling as if she cannot have a bowel movement.     History taken by the daughter who lives with her and is her primary caretaker. Patient had a mechanical fall May 12th in the grocery store.As per the daughter, patient was in the grocery store. Upon leaving the store, the patient seemed to trip and fall on her left side, did not strike her head. Daughter said she was in some pain at the time, and gave her Bengay cream for the left arm pain, no swelling noted at the time. The following day, she found that her mother had severe swelling from her shoulder to her hand, and bruising. Patient was also complaining of severe pain. Daughter brought her to an orthopedist, who she follows for chronic shoulder pain. X rays were done and daughter states that she was sent home. Patient was complaining of severe pain, and inability to  her arm and complaining of left sided rib pain, worse with inhalation. Daughter brought her to [facility] in [place] who prescribed her oxycodone and a five day course of prednisone for the pain. She completed the five day course and was taking oxycodone tablets once a day at bedtime everyday within adequate control of the pain. Yesterday, patient complaining of decreased appetite, appeared more lethargic than usual. Patient was tolerating small bites of crackers, toast. Last night, daughter took her blood pressure and it was 140/70. This morning, daughter checked her temperature which was 101. She gave her tyelenol X 1, which improved the fever. Patient had an appointment to the hand specialist referred to be orthopedist and upon arriving to the office, patient vomited and was not feeling well. Daughter then brought her to the ER instead.     ----  INTERVAL HPI/OVERNIGHT EVENTS: Pt seen and evaluated at the bedside. No acute overnight events occurred. The patient reports feeling constipated the past few days but this was relieved this morning when she had a large BM. She pointed to her L lateral rib cage area and attempted to describe pain but she had a difficult time articulating this w/ the . She had no other complaints at this time. She prefers that she be reassessed when her daughter is present. Denies CP, palpitations. Denies dyspnea, admits intermittent dry cough. Has LUE discomfort related to injury but this isn't severe.   Pt speaks Janelle only. [employer]  services were utilized for my interaction with the patient.  # [phone] - Dilma    ----  PAST MEDICAL & SURGICAL HISTORY:  Diabetes mellitus    CAD (coronary artery disease)  2 cardiac stents placed    Hypertension    Hyperlipidemia    Bilateral cataracts        FAMILY HISTORY:  FH: MI (myocardial infarction) (Child)        Allergies    No Known Allergies    Intolerances        ----  ANTIMICROBIALS:  meropenem  IVPB 1000 milliGRAM(s) IV Intermittent every 8 hours    CARDIOVASCULAR:  hydrALAZINE Injectable 10 milliGRAM(s) IV Push every 6 hours PRN    GASTROINTESTINAL:  pantoprazole    Tablet 40 milliGRAM(s) Oral before breakfast  polyethylene glycol 3350 17 Gram(s) Oral daily PRN  senna 2 Tablet(s) Oral at bedtime  sucralfate 1 Gram(s) Oral every 12 hours    PULMONARY:      ----  REVIEW OF SYSTEMS:  CONSTITUTIONAL: denies fever, chills  HEENT: denies blurred vision, sore throat  CARDIOVASCULAR: denies chest pain, chest pressure, palpitations  RESPIRATORY: denies shortness of breath, sputum production  GASTROINTESTINAL: denies nausea, vomiting   : denies dysuria or urinary frequency  NEUROLOGICAL: denies numbness, headache, focal weakness  MUSCULOSKELETAL: denies new joint pain, muscle aches    ----  PHYSICAL EXAM:  GENERAL: patient appears comfortable, sitting in chair at bedside  ENMT: oropharynx clear without erythema, moist mucous membranes  LUNGS: good air entry bilaterally, clear to auscultation, symmetric breath sounds, no wheezing or rhonchi appreciated  HEART: soft S1/S2, regular rate and rhythm, no murmurs noted, no noted edema to b/l LE  GASTROINTESTINAL: abdomen is soft, nontender, nondistended, normoactive bowel sounds, no palpable masses  MUSCULOSKELETAL: no clubbing or cyanosis, no obvious deformity  NEUROLOGIC: awake, alert, readily interactive, good muscle tone in 4 extremities    T(C): 36.6 (05-28-21 @ 05:09), Max: 36.8 (05-27-21 @ 20:15)  HR: 60 (05-28-21 @ 05:09) (60 - 78)  BP: 171/84 (05-28-21 @ 05:09) (105/57 - 171/84)  RR: 18 (05-28-21 @ 05:09) (18 - 18)  SpO2: 95% (05-28-21 @ 05:09) (95% - 96%)  Wt(kg): --    ----  INTAKE & OUTPUT:  I&O's Summary    27 May 2021 07:01  -  28 May 2021 07:00  --------------------------------------------------------  IN: 810 mL / OUT: 1300 mL / NET: -490 mL        LABS:                        10.7   5.16  )-----------( 327      ( 28 May 2021 06:59 )             33.9     05-28    141  |  106  |  16  ----------------------------<  103<H>  4.1   |  30  |  0.73    Ca    8.7      28 May 2021 06:59  Mg     2.1     05-28    TPro  6.5  /  Alb  2.7<L>  /  TBili  0.7  /  DBili  x   /  AST  30  /  ALT  34  /  AlkPhos  165<H>  05-27        CAPILLARY BLOOD GLUCOSE      POCT Blood Glucose.: 137 mg/dL (28 May 2021 11:57)  POCT Blood Glucose.: 109 mg/dL (28 May 2021 07:51)  POCT Blood Glucose.: 130 mg/dL (27 May 2021 21:02)  POCT Blood Glucose.: 110 mg/dL (27 May 2021 16:59)        Culture - Blood (collected 05-26-21 @ 09:06)  Source: .Blood Blood-Venous  Preliminary Report (05-27-21 @ 10:01):    No growth to date.    Culture - Blood (collected 05-26-21 @ 09:06)  Source: .Blood Blood-Peripheral  Preliminary Report (05-27-21 @ 10:01):    No growth to date.    Culture - Urine (collected 05-26-21 @ 00:31)  Source: .Urine Clean Catch (Midstream)  Final Report (05-26-21 @ 22:51):    No growth        ----  Personally reviewed:  Vital sign trends: [ x ] yes    [  ] no     [  ] n/a  Laboratory results: [ x ] yes    [  ] no     [  ] n/a - leukocytosis resolved  Radiology results: [ x ] yes    [  ] no     [  ] n/a  Microbio results: [ x ] yes    [  ] no     [  ] n/a  Consultant recommendations: [ x ] yes    [  ] no     [  ] n/a

## 2021-05-29 LAB
ANION GAP SERPL CALC-SCNC: 5 MMOL/L — SIGNIFICANT CHANGE UP (ref 5–17)
BUN SERPL-MCNC: 16 MG/DL — SIGNIFICANT CHANGE UP (ref 7–23)
CALCIUM SERPL-MCNC: 9.1 MG/DL — SIGNIFICANT CHANGE UP (ref 8.5–10.1)
CHLORIDE SERPL-SCNC: 105 MMOL/L — SIGNIFICANT CHANGE UP (ref 96–108)
CO2 SERPL-SCNC: 30 MMOL/L — SIGNIFICANT CHANGE UP (ref 22–31)
CREAT SERPL-MCNC: 0.61 MG/DL — SIGNIFICANT CHANGE UP (ref 0.5–1.3)
CULTURE RESULTS: SIGNIFICANT CHANGE UP
GLUCOSE SERPL-MCNC: 102 MG/DL — HIGH (ref 70–99)
HCT VFR BLD CALC: 35.1 % — SIGNIFICANT CHANGE UP (ref 34.5–45)
HGB BLD-MCNC: 11.2 G/DL — LOW (ref 11.5–15.5)
MAGNESIUM SERPL-MCNC: 2.1 MG/DL — SIGNIFICANT CHANGE UP (ref 1.6–2.6)
MCHC RBC-ENTMCNC: 30.2 PG — SIGNIFICANT CHANGE UP (ref 27–34)
MCHC RBC-ENTMCNC: 31.9 GM/DL — LOW (ref 32–36)
MCV RBC AUTO: 94.6 FL — SIGNIFICANT CHANGE UP (ref 80–100)
NRBC # BLD: 0 /100 WBCS — SIGNIFICANT CHANGE UP (ref 0–0)
PLATELET # BLD AUTO: 376 K/UL — SIGNIFICANT CHANGE UP (ref 150–400)
POTASSIUM SERPL-MCNC: 4.3 MMOL/L — SIGNIFICANT CHANGE UP (ref 3.5–5.3)
POTASSIUM SERPL-SCNC: 4.3 MMOL/L — SIGNIFICANT CHANGE UP (ref 3.5–5.3)
RBC # BLD: 3.71 M/UL — LOW (ref 3.8–5.2)
RBC # FLD: 12.3 % — SIGNIFICANT CHANGE UP (ref 10.3–14.5)
SODIUM SERPL-SCNC: 140 MMOL/L — SIGNIFICANT CHANGE UP (ref 135–145)
SPECIMEN SOURCE: SIGNIFICANT CHANGE UP
WBC # BLD: 5.64 K/UL — SIGNIFICANT CHANGE UP (ref 3.8–10.5)
WBC # FLD AUTO: 5.64 K/UL — SIGNIFICANT CHANGE UP (ref 3.8–10.5)

## 2021-05-29 PROCEDURE — 99232 SBSQ HOSP IP/OBS MODERATE 35: CPT

## 2021-05-29 RX ADMIN — HEPARIN SODIUM 5000 UNIT(S): 5000 INJECTION INTRAVENOUS; SUBCUTANEOUS at 06:20

## 2021-05-29 RX ADMIN — Medication 81 MILLIGRAM(S): at 12:33

## 2021-05-29 RX ADMIN — ATENOLOL 25 MILLIGRAM(S): 25 TABLET ORAL at 12:33

## 2021-05-29 RX ADMIN — MEROPENEM 100 MILLIGRAM(S): 1 INJECTION INTRAVENOUS at 22:24

## 2021-05-29 RX ADMIN — SENNA PLUS 2 TABLET(S): 8.6 TABLET ORAL at 22:24

## 2021-05-29 RX ADMIN — MEROPENEM 100 MILLIGRAM(S): 1 INJECTION INTRAVENOUS at 06:20

## 2021-05-29 RX ADMIN — HEPARIN SODIUM 5000 UNIT(S): 5000 INJECTION INTRAVENOUS; SUBCUTANEOUS at 17:26

## 2021-05-29 RX ADMIN — Medication 1 GRAM(S): at 17:26

## 2021-05-29 RX ADMIN — PANTOPRAZOLE SODIUM 40 MILLIGRAM(S): 20 TABLET, DELAYED RELEASE ORAL at 06:20

## 2021-05-29 RX ADMIN — ISOSORBIDE MONONITRATE 30 MILLIGRAM(S): 60 TABLET, EXTENDED RELEASE ORAL at 12:33

## 2021-05-29 RX ADMIN — Medication 5 MILLIGRAM(S): at 22:24

## 2021-05-29 RX ADMIN — Medication 1 GRAM(S): at 06:20

## 2021-05-29 RX ADMIN — MEROPENEM 100 MILLIGRAM(S): 1 INJECTION INTRAVENOUS at 13:00

## 2021-05-29 RX ADMIN — CLOPIDOGREL BISULFATE 75 MILLIGRAM(S): 75 TABLET, FILM COATED ORAL at 12:33

## 2021-05-29 NOTE — PROGRESS NOTE ADULT - SUBJECTIVE AND OBJECTIVE BOX
Name: KADY SUAREZ  MRN: [id number]  LOCATION: [facility] [address]    ----  Patient is a 89y old  Female who presents with a chief complaint of sepsis (29 May 2021 10:21)      FROM ADMISSION H+P:   HPI:  89 yo F PMHx T2DM, HTN, HLD, GERD, CAD (s/p LAD 2019 with stents placed on DAPT) brought in by Daughter for fever, weakness. Patient states that she fell a week ago and since then she has been having pain in her left arm and left chest. Patient also complaining of constipation, feeling as if she cannot have a bowel movement.     History taken by the daughter who lives with her and is her primary caretaker. Patient had a mechanical fall May 12th in the grocery store.As per the daughter, patient was in the grocery store. Upon leaving the store, the patient seemed to trip and fall on her left side, did not strike her head. Daughter said she was in some pain at the time, and gave her Bengay cream for the left arm pain, no swelling noted at the time. The following day, she found that her mother had severe swelling from her shoulder to her hand, and bruising. Patient was also complaining of severe pain. Daughter brought her to an orthopedist, who she follows for chronic shoulder pain. X rays were done and daughter states that she was sent home. Patient was complaining of severe pain, and inability to  her arm and complaining of left sided rib pain, worse with inhalation. Daughter brought her to [facility] in [place] who prescribed her oxycodone and a five day course of prednisone for the pain. She completed the five day course and was taking oxycodone tablets once a day at bedtime everyday within adequate control of the pain. Yesterday, patient complaining of decreased appetite, appeared more lethargic than usual. Patient was tolerating small bites of crackers, toast. Last night, daughter took her blood pressure and it was 140/70. This morning, daughter checked her temperature which was 101. She gave her tyelenol X 1, which improved the fever. Patient had an appointment to the hand specialist referred to be orthopedist and upon arriving to the office, patient vomited and was not feeling well. Daughter then brought her to the ER instead.     ----  INTERVAL HPI/OVERNIGHT EVENTS: Pt seen and evaluated at the bedside. No acute overnight events occurred. History taken w/ daughter at bedisde. No new complaints today. She has concerns about logistics of transitioning care from hospital to home but she has no new clinical concerns. Pt reports feeling weak, tired and stiff but otherwise no fever/chills. No other new complaints. Feeling generally OK and gave me the thumbs up with a smile.     ----  PAST MEDICAL & SURGICAL HISTORY:  Diabetes mellitus    CAD (coronary artery disease)  2 cardiac stents placed    Hypertension    Hyperlipidemia    Bilateral cataracts        FAMILY HISTORY:  FH: MI (myocardial infarction) (Child)        Allergies    No Known Allergies    Intolerances        ----  ANTIMICROBIALS:  meropenem  IVPB 1000 milliGRAM(s) IV Intermittent every 8 hours    CARDIOVASCULAR:  ATENolol  Tablet 25 milliGRAM(s) Oral daily  hydrALAZINE Injectable 10 milliGRAM(s) IV Push every 6 hours PRN  isosorbide   mononitrate ER Tablet (IMDUR) 30 milliGRAM(s) Oral daily    GASTROINTESTINAL:  pantoprazole    Tablet 40 milliGRAM(s) Oral before breakfast  polyethylene glycol 3350 17 Gram(s) Oral daily PRN  senna 2 Tablet(s) Oral at bedtime  sucralfate 1 Gram(s) Oral every 12 hours    PULMONARY:      ----  REVIEW OF SYSTEMS:  CONSTITUTIONAL: denies fever, chills  HEENT: denies blurred vision, sore throat  CARDIOVASCULAR: denies chest pain, chest pressure, palpitations  RESPIRATORY: denies shortness of breath, sputum production  GASTROINTESTINAL: denies nausea, vomiting   : denies dysuria or urinary frequency  NEUROLOGICAL: denies numbness, headache, focal weakness  MUSCULOSKELETAL: denies new joint pain, muscle aches    ----  PHYSICAL EXAM:  GENERAL: patient appears comfortable, sitting in chair at bedside  ENMT: oropharynx clear without erythema, moist mucous membranes  LUNGS: good air entry bilaterally, clear to auscultation, symmetric breath sounds, no wheezing or rhonchi appreciated  HEART: soft S1/S2, regular rate and rhythm, no murmurs noted, no noted edema to b/l LE  GASTROINTESTINAL: abdomen is soft, nontender, nondistended, normoactive bowel sounds, no palpable masses  MUSCULOSKELETAL: no clubbing or cyanosis, no obvious deformity  NEUROLOGIC: awake, alert, readily interactive, good muscle tone in 4 extremities    T(C): 36.4 (05-29-21 @ 13:24), Max: 36.7 (05-29-21 @ 04:54)  HR: 74 (05-29-21 @ 13:24) (58 - 74)  BP: 114/73 (05-29-21 @ 13:24) (114/73 - 181/81)  RR: 19 (05-29-21 @ 13:24) (18 - 22)  SpO2: 96% (05-29-21 @ 13:24) (95% - 98%)  Wt(kg): --    ----  INTAKE & OUTPUT:  I&O's Summary    28 May 2021 07:01  -  29 May 2021 07:00  --------------------------------------------------------  IN: 50 mL / OUT: 0 mL / NET: 50 mL    29 May 2021 07:01  -  29 May 2021 19:05  --------------------------------------------------------  IN: 50 mL / OUT: 0 mL / NET: 50 mL        LABS:                        11.2   5.64  )-----------( 376      ( 29 May 2021 07:20 )             35.1     05-29    140  |  105  |  16  ----------------------------<  102<H>  4.3   |  30  |  0.61    Ca    9.1      29 May 2021 07:20  Mg     2.1     05-29          CAPILLARY BLOOD GLUCOSE      POCT Blood Glucose.: 122 mg/dL (29 May 2021 17:01)  POCT Blood Glucose.: 94 mg/dL (29 May 2021 12:31)  POCT Blood Glucose.: 105 mg/dL (29 May 2021 08:12)  POCT Blood Glucose.: 98 mg/dL (28 May 2021 21:03)

## 2021-05-29 NOTE — PROGRESS NOTE ADULT - SUBJECTIVE AND OBJECTIVE BOX
[facility] -- Marsha Agarwal, Vanessa Reinoso, Damian Lemus, Gayathri Ordoñez: Office # [phone]    Follow Up: Sepsis, Hx of CAD stents with CBD stone for Cardiac clearance     Subjective/Observations: Patient seen and examined. Patient awake, alert, resting in bed. No complaints of chest pain, dyspnea, palpitations or dizziness. No signs of orthopnea or PND. Lt arm on sling.  Tolerating room air.     REVIEW OF SYSTEMS: All review of systems is negative for eye, ENT, GI, , allergic, dermatologic, musculoskeletal and neurologic except as described above    PAST MEDICAL & SURGICAL HISTORY:  Diabetes mellitus    CAD (coronary artery disease)  2 cardiac stents placed    Hypertension    Hyperlipidemia    Bilateral cataracts        MEDICATIONS  (STANDING):  aspirin enteric coated 81 milliGRAM(s) Oral daily  ATENolol  Tablet 25 milliGRAM(s) Oral daily  clopidogrel Tablet 75 milliGRAM(s) Oral daily  dextrose 40% Gel 15 Gram(s) Oral once  dextrose 5%. 1000 milliLiter(s) (50 mL/Hr) IV Continuous <Continuous>  dextrose 5%. 1000 milliLiter(s) (100 mL/Hr) IV Continuous <Continuous>  dextrose 50% Injectable 25 Gram(s) IV Push once  dextrose 50% Injectable 12.5 Gram(s) IV Push once  dextrose 50% Injectable 25 Gram(s) IV Push once  glucagon  Injectable 1 milliGRAM(s) IntraMuscular once  heparin   Injectable 5000 Unit(s) SubCutaneous every 12 hours  insulin lispro (ADMELOG) corrective regimen sliding scale   SubCutaneous three times a day before meals  insulin lispro (ADMELOG) corrective regimen sliding scale   SubCutaneous at bedtime  isosorbide   mononitrate ER Tablet (IMDUR) 30 milliGRAM(s) Oral daily  melatonin 5 milliGRAM(s) Oral at bedtime  meropenem  IVPB 1000 milliGRAM(s) IV Intermittent every 8 hours  pantoprazole    Tablet 40 milliGRAM(s) Oral before breakfast  senna 2 Tablet(s) Oral at bedtime  sucralfate 1 Gram(s) Oral every 12 hours    MEDICATIONS  (PRN):  acetaminophen   Tablet .. 650 milliGRAM(s) Oral every 6 hours PRN Mild Pain (1 - 3)  hydrALAZINE Injectable 10 milliGRAM(s) IV Push every 6 hours PRN For sbp>140  morphine  - Injectable 2 milliGRAM(s) IV Push every 6 hours PRN Severe Pain (7 - 10)  ondansetron Injectable 4 milliGRAM(s) IV Push every 8 hours PRN Nausea and/or Vomiting  polyethylene glycol 3350 17 Gram(s) Oral daily PRN Constipation    Allergies    No Known Allergies    Intolerances      Vital Signs Last 24 Hrs  T(C): 36.7 (29 May 2021 04:54), Max: 36.7 (29 May 2021 04:54)  T(F): 98 (29 May 2021 04:54), Max: 98 (29 May 2021 04:54)  HR: 58 (29 May 2021 06:19) (58 - 68)  BP: 146/73 (29 May 2021 06:19) (133/76 - 181/81)  BP(mean): --  RR: 22 (29 May 2021 04:54) (18 - 22)  SpO2: 98% (29 May 2021 04:54) (95% - 98%)  I&O's Summary    28 May 2021 07:01  -  29 May 2021 07:00  --------------------------------------------------------  IN: 50 mL / OUT: 0 mL / NET: 50 mL          TELE: Not on telemetry   PHYSICAL EXAM:  Appearance: NAD, no distress, alert, Well developed   HEENT: Moist Mucous Membranes, Anicteric  Cardiovascular: Regular rate and rhythm, Normal S1 S2, No JVD, No murmurs, No rubs, gallops or clicks  Respiratory: Non-labored, Clear to auscultation, No rales, No rhonchi, No wheezing.   Gastrointestinal:  Soft, Non-tender, + BS  Neurologic: Non-focal  Skin: Warm and dry, No visible rashes or ulcers, No ecchymosis, No cyanosis  Musculoskeletal: No clubbing, No cyanosis, LUE in sling + tenderness  Psychiatry: Mood & affect appropriate  Lymph: No peripheral edema.     LABS: All Labs Reviewed:                        11.2   5.64  )-----------( 376      ( 29 May 2021 07:20 )             35.1                         10.7   5.16  )-----------( 327      ( 28 May 2021 06:59 )             33.9                         11.0   5.36  )-----------( 357      ( 27 May 2021 07:03 )             34.2     29 May 2021 07:20    140    |  105    |  16     ----------------------------<  102    4.3     |  30     |  0.61   28 May 2021 06:59    141    |  106    |  16     ----------------------------<  103    4.1     |  30     |  0.73   27 May 2021 07:03    139    |  104    |  14     ----------------------------<  94     3.9     |  27     |  0.73     Ca    9.1        29 May 2021 07:20  Ca    8.7        28 May 2021 06:59  Ca    8.7        27 May 2021 07:03  Mg     2.1       29 May 2021 07:20  Mg     2.1       28 May 2021 06:59    TPro  6.5    /  Alb  2.7    /  TBili  0.7    /  DBili  x      /  AST  30     /  ALT  34     /  AlkPhos  165    27 May 2021 07:03      Troponin I, Serum: .034 ng/mL (05-24-21 @ 15:29)      12 Lead ECG:   Ventricular Rate 53 BPM  Atrial Rate 53 BPM  P-R Interval 146 ms  QRS Durtion 64 ms  Q-T Interval 420 ms  QTC Calculation(Bazett) 394 ms  P Axis 28 degrees  R Axis 27 degrees  T Axis 49 degrees  Diagnosis Line Sinus bradycardia with marked sinus arrhythmia  Otherwise normal ECG  No previous ECGs available  Confirmed by Cheko Mendez MD ([id number]) on 5/25/2021 1:36:03 PM (05-24-21 @ 14:58)    < from: Cardiac Cath Lab - Adult (07.10.19 @ 09:44) >   Cardiac Arteries and Lesion Findings  LMCA: Normal.  LAD: Diffuse irregularity.Widely patent stent, in LAD and Diagonal branch  There is a 40% stenosis in diagonal branch proximal to the stent  LCx: Diffuse irregularity.  RCA: Diffuse irregularity.   Impression   Diagnostic Conclusions   Non-Obstructive CAD.   Patent stents in LAD and Diagonal branch.   Normal LV systolic function. Estimated LV ejection fraction is 65 %.   Recommendations   Aggressive medical management of coronary artery disease and its   underlying risk factors.   Manage with medical therapy.  < end of copied text >    < from:  MRCP No Cont (05.25.21 @ 10:32) >  IMPRESSION:  Dilated common bile duct measuring 1.4 cm in diameter with gradual tapering distally. No evidence for choledocholithiasis.  Pancreas divisum.  Gallstones in the gallbladder. No evidence for thickened gallbladder wall. Small pericholecystic fluid. If there is a clinical suspicion for acute cholecystitis, HIDA scan may be pursued for further evaluation.  Small ascites.  Trace left pleural effusion.  < end of copied text >

## 2021-05-29 NOTE — PROGRESS NOTE ADULT - ASSESSMENT
89 yo F PMH T2DM, HTN, HLD, GERD, CAD (s/p LAD/D1 stents, placed on DAPT) admitted for sepsis likely due to intra-abdominal pathology, possible cholangitis given CBD dilation and elevated Tbili and LFTs. Cardiology consulted for medical clearance for possible ERCP.    CAD s/p 2 stents placed in 2015 of the LAD/D1, on DAPT   - Patient is not complaining of any cardiac symptoms at this time.  - EKG: sinus bradycardia HR: 53, no signs of acute ischemia, no changes compared to previous EKG (EKG in 2019 was sinus bradycardia HR: 54)  - No clear evidence of acute ischemia, trops negative x 1.   - No meaningful evidence of volume overload.  - No previous echo on record.   - Recent cath in 2019-Nonobstructive CAD, patent stents, EF: 65%   - Discontinue Plavix as patient no longer needs dual antiplatelet therapy.    - Continue Aspirin    HTN  - BP: 146/73 (05-29-21 @ 06:19)    - BP now elevated given that BP meds were held due to hypotension on admission   - Continue Atenolol 25 mg QD, Imdur 30 mg QD  - Hydralazine 10mg IVP q6H prn for SBP >140.   - Would restart home Losartan and if BP stable can D/C Hydralazine IV  - Monitor routine hemodynamics    HLD  - Continue home statin when LFTs stabilize    Dilated CBD  - MRCP with dilated CBD   - elevated LFTs now downtrending  - continue ABX as per ID for Bld CX + GNR  - Follow GI recommendations.     S/p Fall with Fractured L humerus  - Ortho following  - Pain control  - I/S     - Monitor and replete lytes, keep K>4, Mg>2.  - All other medical needs as per primary team.  - Other cardiovascular workup will depend on clinical course.  - Will continue to follow.    Yvonne Simon, MS ANP, AGACNP  Nurse Practitioner- Cardiology   Spectra #[phone]

## 2021-05-30 LAB
ANION GAP SERPL CALC-SCNC: 6 MMOL/L — SIGNIFICANT CHANGE UP (ref 5–17)
BUN SERPL-MCNC: 19 MG/DL — SIGNIFICANT CHANGE UP (ref 7–23)
CALCIUM SERPL-MCNC: 8.7 MG/DL — SIGNIFICANT CHANGE UP (ref 8.5–10.1)
CHLORIDE SERPL-SCNC: 106 MMOL/L — SIGNIFICANT CHANGE UP (ref 96–108)
CO2 SERPL-SCNC: 28 MMOL/L — SIGNIFICANT CHANGE UP (ref 22–31)
CREAT SERPL-MCNC: 0.59 MG/DL — SIGNIFICANT CHANGE UP (ref 0.5–1.3)
GLUCOSE SERPL-MCNC: 103 MG/DL — HIGH (ref 70–99)
HCT VFR BLD CALC: 34.4 % — LOW (ref 34.5–45)
HGB BLD-MCNC: 10.7 G/DL — LOW (ref 11.5–15.5)
MAGNESIUM SERPL-MCNC: 2.1 MG/DL — SIGNIFICANT CHANGE UP (ref 1.6–2.6)
MCHC RBC-ENTMCNC: 29.6 PG — SIGNIFICANT CHANGE UP (ref 27–34)
MCHC RBC-ENTMCNC: 31.1 GM/DL — LOW (ref 32–36)
MCV RBC AUTO: 95.3 FL — SIGNIFICANT CHANGE UP (ref 80–100)
NRBC # BLD: 0 /100 WBCS — SIGNIFICANT CHANGE UP (ref 0–0)
PLATELET # BLD AUTO: 347 K/UL — SIGNIFICANT CHANGE UP (ref 150–400)
POTASSIUM SERPL-MCNC: 4 MMOL/L — SIGNIFICANT CHANGE UP (ref 3.5–5.3)
POTASSIUM SERPL-SCNC: 4 MMOL/L — SIGNIFICANT CHANGE UP (ref 3.5–5.3)
RBC # BLD: 3.61 M/UL — LOW (ref 3.8–5.2)
RBC # FLD: 12.4 % — SIGNIFICANT CHANGE UP (ref 10.3–14.5)
SODIUM SERPL-SCNC: 140 MMOL/L — SIGNIFICANT CHANGE UP (ref 135–145)
WBC # BLD: 6.26 K/UL — SIGNIFICANT CHANGE UP (ref 3.8–10.5)
WBC # FLD AUTO: 6.26 K/UL — SIGNIFICANT CHANGE UP (ref 3.8–10.5)

## 2021-05-30 PROCEDURE — 99232 SBSQ HOSP IP/OBS MODERATE 35: CPT

## 2021-05-30 RX ADMIN — MEROPENEM 100 MILLIGRAM(S): 1 INJECTION INTRAVENOUS at 14:18

## 2021-05-30 RX ADMIN — Medication 5 MILLIGRAM(S): at 21:58

## 2021-05-30 RX ADMIN — HEPARIN SODIUM 5000 UNIT(S): 5000 INJECTION INTRAVENOUS; SUBCUTANEOUS at 17:35

## 2021-05-30 RX ADMIN — ISOSORBIDE MONONITRATE 30 MILLIGRAM(S): 60 TABLET, EXTENDED RELEASE ORAL at 11:34

## 2021-05-30 RX ADMIN — SENNA PLUS 2 TABLET(S): 8.6 TABLET ORAL at 21:58

## 2021-05-30 RX ADMIN — MEROPENEM 100 MILLIGRAM(S): 1 INJECTION INTRAVENOUS at 05:49

## 2021-05-30 RX ADMIN — Medication 1 GRAM(S): at 05:47

## 2021-05-30 RX ADMIN — MEROPENEM 100 MILLIGRAM(S): 1 INJECTION INTRAVENOUS at 21:58

## 2021-05-30 RX ADMIN — Medication 1 GRAM(S): at 17:35

## 2021-05-30 RX ADMIN — PANTOPRAZOLE SODIUM 40 MILLIGRAM(S): 20 TABLET, DELAYED RELEASE ORAL at 05:48

## 2021-05-30 RX ADMIN — ATENOLOL 25 MILLIGRAM(S): 25 TABLET ORAL at 05:47

## 2021-05-30 RX ADMIN — HEPARIN SODIUM 5000 UNIT(S): 5000 INJECTION INTRAVENOUS; SUBCUTANEOUS at 05:47

## 2021-05-30 RX ADMIN — Medication 81 MILLIGRAM(S): at 11:35

## 2021-05-30 NOTE — PROGRESS NOTE ADULT - ASSESSMENT
dilated cbd  elevated lfts  fever    reg diet  IV antibiotics as per ID   Bld cx noted  f/u repeat cultures  MRCP noted  HIDA hegative  suspect passed stone  repeat cultures negative  no need for ercp  will follow        Advanced care planning was discussed with patient and family.  Advanced care planning forms were reviewed and discussed.  Risks, benefits and alternatives of gastroenterologic procedures were discussed in detail and all questions were answered.    30 minutes spent.

## 2021-05-30 NOTE — PROGRESS NOTE ADULT - ASSESSMENT
89 yo F PMH T2DM, HTN, HLD, GERD, CAD (s/p LAD/D1 stents, placed on DAPT) admitted for sepsis likely due to intra-abdominal pathology, possible cholangitis given CBD dilation and elevated Tbili and LFTs. No plans for ERCP as per GI    CAD s/p 2 stents placed in 2015 of the LAD/D1, on DAPT   - Patient is not complaining of any cardiac symptoms at this time.  - EKG: sinus bradycardia HR: 53, no signs of acute ischemia, no changes compared to previous EKG (EKG in 2019 was sinus bradycardia HR: 54)  - No clear evidence of acute ischemia, trops negative x 1.   - No meaningful evidence of volume overload.  - No previous echo on record.   - Recent cath in 2019-Nonobstructive CAD, patent stents, EF: 65%   - Discontinue Plavix as patient no longer needs dual antiplatelet therapy.    - Continue Aspirin    HTN  - BP: 146/73 (05-29-21 @ 06:19)    - BP now elevated given that BP meds were held due to hypotension on admission   - increase Atenolol to 50 mg QD,   - c/wImdur 30 mg QD  - Hydralazine 10mg IVP q6H prn for SBP >140.   - Would restart home Losartan and if BP stable can D/C Hydralazine IV  - Monitor routine hemodynamics    HLD  - Continue home statin when LFTs stabilize    Dilated CBD  - MRCP with dilated CBD   - elevated LFTs now downtrending  - continue ABX as per ID for Bld CX + GNR  - Follow GI recommendations.     S/p Fall with Fractured L humerus  - Ortho following  - Pain control  - I/S     - Monitor and replete lytes, keep K>4, Mg>2.  - All other medical needs as per primary team.  - Other cardiovascular workup will depend on clinical course.  - Will continue to follow.

## 2021-05-30 NOTE — PROGRESS NOTE ADULT - SUBJECTIVE AND OBJECTIVE BOX
· Subjective and Objective:   [facility]:    Marsha Agarwal, Vanessa Reinoso, Damian Lemus [place], [place]      [phone]    CHIEF COMPLAINT: Patient is a 89y old  Female who presents with a chief complaint of sepsis (30 May 2021 14:22)    Follow Up: Sepsis, Hx of CAD stents with CBD stone for Cardiac clearance     Subjective/Observations: Patient seen and examined. Patient awake, alert, resting in bed. No complaints of chest pain, dyspnea, palpitations or dizziness. No signs of orthopnea or PND. Lt arm on sling.  Tolerating room air.     REVIEW OF SYSTEMS: All review of systems is negative for eye, ENT, GI, , allergic, dermatologic, musculoskeletal and neurologic except as described above        PAST MEDICAL & SURGICAL HISTORY:  Diabetes mellitus    CAD (coronary artery disease)  2 cardiac stents placed    Hypertension    Hyperlipidemia    Bilateral cataracts        MEDICATIONS  (STANDING):  aspirin enteric coated 81 milliGRAM(s) Oral daily  ATENolol  Tablet 25 milliGRAM(s) Oral daily  dextrose 40% Gel 15 Gram(s) Oral once  dextrose 5%. 1000 milliLiter(s) (50 mL/Hr) IV Continuous <Continuous>  dextrose 5%. 1000 milliLiter(s) (100 mL/Hr) IV Continuous <Continuous>  dextrose 50% Injectable 25 Gram(s) IV Push once  dextrose 50% Injectable 12.5 Gram(s) IV Push once  dextrose 50% Injectable 25 Gram(s) IV Push once  glucagon  Injectable 1 milliGRAM(s) IntraMuscular once  heparin   Injectable 5000 Unit(s) SubCutaneous every 12 hours  insulin lispro (ADMELOG) corrective regimen sliding scale   SubCutaneous three times a day before meals  insulin lispro (ADMELOG) corrective regimen sliding scale   SubCutaneous at bedtime  isosorbide   mononitrate ER Tablet (IMDUR) 30 milliGRAM(s) Oral daily  melatonin 5 milliGRAM(s) Oral at bedtime  meropenem  IVPB 1000 milliGRAM(s) IV Intermittent every 8 hours  pantoprazole    Tablet 40 milliGRAM(s) Oral before breakfast  senna 2 Tablet(s) Oral at bedtime  sucralfate 1 Gram(s) Oral every 12 hours      Allergies    No Known Allergies    Intolerances                              10.7   6.26  )-----------( 347      ( 30 May 2021 07:46 )             34.4       05-30    140  |  106  |  19  ----------------------------<  103<H>  4.0   |  28  |  0.59    Ca    8.7      30 May 2021 07:46  Mg     2.1                                       Daily     Daily Weight in k.2 (30 May 2021 04:20)    I&O's Summary    29 May 2021 07:01  -  30 May 2021 07:00  --------------------------------------------------------  IN: 350 mL / OUT: 0 mL / NET: 350 mL        Vital Signs Last 24 Hrs  T(C): 36.8 (30 May 2021 13:23), Max: 36.8 (30 May 2021 04:20)  T(F): 98.3 (30 May 2021 13:23), Max: 98.3 (30 May 2021 13:23)  HR: 80 (30 May 2021 13:23) (63 - 80)  BP: 114/64 (30 May 2021 14:19) (95/52 - 169/79)  BP(mean): --  RR: 19 (30 May 2021 13:23) (18 - 19)  SpO2: 95% (30 May 2021 13:23) (91% - 95%)    PHYSICAL EXAM:   · Constitutional	Well-developed, well nourished  · Eyes	EOMI; PERRL; no drainage or redness  · ENMT	No oral lesions; no gross abnormalities  · Neck	No bruits; no thyromegaly or nodules  · Respiratory	Normal breath sounds b/l, No RRW  · Cardiovascular	Regular rate & rhythm, normal S1, S2; no murmurs, gallops or rubs; no S3, S4  · Gastrointestinal	Soft, non-tender, no hepatosplenomegaly, normal bowel sounds  · Extremities	No cyanosis, clubbing or edema  · Vascular	Equal and normal pulses (carotid, femoral, dorsalis pedis)  · Neurological	Alert & oriented; no sensory, motor or coordination deficits, normal reflexes

## 2021-05-30 NOTE — PROGRESS NOTE ADULT - SUBJECTIVE AND OBJECTIVE BOX
Name: KADY SUAREZ  MRN: [id number]  LOCATION: [facility] [address]    ----  Patient is a 89y old  Female who presents with a chief complaint of sepsis (30 May 2021 14:26)      FROM ADMISSION H+P:   HPI:  88 yo F PMHx T2DM, HTN, HLD, GERD, CAD (s/p LAD 2019 with stents placed on DAPT) brought in by Daughter for fever, weakness. Patient states that she fell a week ago and since then she has been having pain in her left arm and left chest. Patient also complaining of constipation, feeling as if she cannot have a bowel movement.     History taken by the daughter who lives with her and is her primary caretaker. Patient had a mechanical fall May 12th in the grocery store.As per the daughter, patient was in the grocery store. Upon leaving the store, the patient seemed to trip and fall on her left side, did not strike her head. Daughter said she was in some pain at the time, and gave her Bengay cream for the left arm pain, no swelling noted at the time. The following day, she found that her mother had severe swelling from her shoulder to her hand, and bruising. Patient was also complaining of severe pain. Daughter brought her to an orthopedist, who she follows for chronic shoulder pain. X rays were done and daughter states that she was sent home. Patient was complaining of severe pain, and inability to  her arm and complaining of left sided rib pain, worse with inhalation. Daughter brought her to [facility] in [place] who prescribed her oxycodone and a five day course of prednisone for the pain. She completed the five day course and was taking oxycodone tablets once a day at bedtime everyday within adequate control of the pain. Yesterday, patient complaining of decreased appetite, appeared more lethargic than usual. Patient was tolerating small bites of crackers, toast. Last night, daughter took her blood pressure and it was 140/70. This morning, daughter checked her temperature which was 101. She gave her tyelenol X 1, which improved the fever. Patient had an appointment to the hand specialist referred to be orthopedist and upon arriving to the office, patient vomited and was not feeling well. Daughter then brought her to the ER instead.    ----  INTERVAL HPI/OVERNIGHT EVENTS: Pt seen and evaluated at the bedside. No acute overnight events occurred. The patient reports feeling well today. Daughter at bedside to interpret for our conversation. Pt reports feeilng tired and feeling lazy from lying in bed all day. Otherwise, they have no complaints or concerns at this time. Appetite is good. Denies fever/chills. Denies HA or blurred vision.     ----  PAST MEDICAL & SURGICAL HISTORY:  Diabetes mellitus    CAD (coronary artery disease)  2 cardiac stents placed    Hypertension    Hyperlipidemia    Bilateral cataracts        FAMILY HISTORY:  FH: MI (myocardial infarction) (Child)        Allergies    No Known Allergies    Intolerances        ----  ANTIMICROBIALS:  meropenem  IVPB 1000 milliGRAM(s) IV Intermittent every 8 hours    CARDIOVASCULAR:  ATENolol  Tablet 25 milliGRAM(s) Oral daily  isosorbide   mononitrate ER Tablet (IMDUR) 30 milliGRAM(s) Oral daily    GASTROINTESTINAL:  pantoprazole    Tablet 40 milliGRAM(s) Oral before breakfast  polyethylene glycol 3350 17 Gram(s) Oral daily PRN  senna 2 Tablet(s) Oral at bedtime  sucralfate 1 Gram(s) Oral every 12 hours    PULMONARY:      ----  REVIEW OF SYSTEMS:  comprehensive ROS as per HPI     ----  PHYSICAL EXAM:  GENERAL: patient appears comfortable, sitting in chair at bedside  ENMT: oropharynx clear without erythema, moist mucous membranes  LUNGS: good air entry bilaterally, clear to auscultation, symmetric breath sounds, no wheezing or rhonchi appreciated  HEART: soft S1/S2, regular rate and rhythm, no murmurs noted, no noted edema to b/l LE  GASTROINTESTINAL: abdomen is soft, nontender, nondistended, normoactive bowel sounds, no palpable masses  MUSCULOSKELETAL: no clubbing or cyanosis, no obvious deformity  NEUROLOGIC: awake, alert, readily interactive, good muscle tone in 4 extremities    T(C): 36.8 (05-30-21 @ 13:23), Max: 36.8 (05-30-21 @ 04:20)  HR: 80 (05-30-21 @ 13:23) (63 - 80)  BP: 114/64 (05-30-21 @ 14:19) (95/52 - 169/79)  RR: 19 (05-30-21 @ 13:23) (18 - 19)  SpO2: 95% (05-30-21 @ 13:23) (91% - 95%)  Wt(kg): --    ----  INTAKE & OUTPUT:  I&O's Summary    29 May 2021 07:01  -  30 May 2021 07:00  --------------------------------------------------------  IN: 350 mL / OUT: 0 mL / NET: 350 mL        LABS:                        10.7   6.26  )-----------( 347      ( 30 May 2021 07:46 )             34.4     05-30    140  |  106  |  19  ----------------------------<  103<H>  4.0   |  28  |  0.59    Ca    8.7      30 May 2021 07:46  Mg     2.1     05-30          CAPILLARY BLOOD GLUCOSE      POCT Blood Glucose.: 133 mg/dL (30 May 2021 17:06)  POCT Blood Glucose.: 140 mg/dL (30 May 2021 11:46)  POCT Blood Glucose.: 100 mg/dL (30 May 2021 07:44)  POCT Blood Glucose.: 119 mg/dL (29 May 2021 20:58)

## 2021-05-31 LAB
CULTURE RESULTS: SIGNIFICANT CHANGE UP
CULTURE RESULTS: SIGNIFICANT CHANGE UP
SPECIMEN SOURCE: SIGNIFICANT CHANGE UP
SPECIMEN SOURCE: SIGNIFICANT CHANGE UP

## 2021-05-31 PROCEDURE — 99232 SBSQ HOSP IP/OBS MODERATE 35: CPT

## 2021-05-31 RX ORDER — LOSARTAN POTASSIUM 100 MG/1
50 TABLET, FILM COATED ORAL DAILY
Refills: 0 | Status: DISCONTINUED | OUTPATIENT
Start: 2021-06-01 | End: 2021-06-01

## 2021-05-31 RX ADMIN — SENNA PLUS 2 TABLET(S): 8.6 TABLET ORAL at 21:31

## 2021-05-31 RX ADMIN — MEROPENEM 100 MILLIGRAM(S): 1 INJECTION INTRAVENOUS at 13:26

## 2021-05-31 RX ADMIN — HEPARIN SODIUM 5000 UNIT(S): 5000 INJECTION INTRAVENOUS; SUBCUTANEOUS at 06:06

## 2021-05-31 RX ADMIN — ATENOLOL 25 MILLIGRAM(S): 25 TABLET ORAL at 06:05

## 2021-05-31 RX ADMIN — Medication 1 GRAM(S): at 17:23

## 2021-05-31 RX ADMIN — Medication 5 MILLIGRAM(S): at 21:31

## 2021-05-31 RX ADMIN — Medication 81 MILLIGRAM(S): at 11:53

## 2021-05-31 RX ADMIN — Medication 1 GRAM(S): at 06:05

## 2021-05-31 RX ADMIN — HEPARIN SODIUM 5000 UNIT(S): 5000 INJECTION INTRAVENOUS; SUBCUTANEOUS at 17:23

## 2021-05-31 RX ADMIN — PANTOPRAZOLE SODIUM 40 MILLIGRAM(S): 20 TABLET, DELAYED RELEASE ORAL at 06:05

## 2021-05-31 RX ADMIN — MEROPENEM 100 MILLIGRAM(S): 1 INJECTION INTRAVENOUS at 06:05

## 2021-05-31 RX ADMIN — MEROPENEM 100 MILLIGRAM(S): 1 INJECTION INTRAVENOUS at 21:31

## 2021-05-31 RX ADMIN — ISOSORBIDE MONONITRATE 30 MILLIGRAM(S): 60 TABLET, EXTENDED RELEASE ORAL at 11:52

## 2021-05-31 NOTE — PROGRESS NOTE ADULT - PROBLEM SELECTOR PLAN 6
s/p mechanical fall  - pain control as above   - PT consulted

## 2021-05-31 NOTE — PROGRESS NOTE ADULT - ASSESSMENT
89 yo F PMH T2DM, HTN, HLD, GERD, CAD (s/p LAD/D1 stents, placed on DAPT) admitted for sepsis likely due to intra-abdominal pathology, possible cholangitis given CBD dilation and elevated Tbili and LFTs. No plans for ERCP as per GI    CAD s/p 2 stents placed in 2015 of the LAD/D1, on DAPT   - Patient is not complaining of any cardiac symptoms at this time.  - EKG: sinus bradycardia HR: 53, no signs of acute ischemia, no changes compared to previous EKG (EKG in 2019 was sinus bradycardia HR: 54)  - No clear evidence of acute ischemia, trops negative x 1.   - No meaningful evidence of volume overload.  - No previous echo on record.   - Recent cath in 2019-Nonobstructive CAD, patent stents, EF: 65%    - Continue Aspirin    HTN  - c/w Atenolol 25 mg QD,   - c/w Imdur 30 mg QD  - Hydralazine 10mg IVP q6H prn for SBP >140.   - Would restart home Losartan and if BP stable can D/C Hydralazine IV  - Monitor routine hemodynamics    HLD  - Continue home statin when LFTs stabilize    Dilated CBD  - MRCP with dilated CBD   - elevated LFTs now downtrending  - continue ABX as per ID for Bld CX + GNR  - Follow GI recommendations.     S/p Fall with Fractured L humerus  - Ortho following  - Pain control  - I/S     - Monitor and replete lytes, keep K>4, Mg>2.  - All other medical needs as per primary team.  - Other cardiovascular workup will depend on clinical course.  - Will continue to follow.

## 2021-05-31 NOTE — PROGRESS NOTE ADULT - PROBLEM SELECTOR PLAN 8
history of Left heart cath 2019, s/p LAD/ D1 stent in 3/2015, on ASA 81/Plavix at home   - hold plavix for now  - continue asa 81 mg daily  - patient has no substernal chest pain and low suspicion for ACS at this time   - troponin negative, no need to trend cardiac enzymes   - cardio [facility] consulted for clearance for possible ERCP; on DAPT
history of Left heart cath 2019, s/p LAD/ D1 stent in 3/2015, on ASA 81/Plavix at home   - dc plavix - no longer indicated  - continue asa 81 mg daily  - patient has no substernal chest pain and low suspicion for ACS at this time   - troponin negative, no need to trend cardiac enzymes   - cardio following
history of Left heart cath 2019, s/p LAD/ D1 stent in 3/2015, on ASA 81/Plavix at home   - dc plavix - no longer indicated  - continue asa 81 mg daily  - patient has no substernal chest pain and low suspicion for ACS at this time
history of Left heart cath 2019, s/p LAD/ D1 stent in 3/2015, on ASA 81/Plavix at home   - dc plavix - no longer indicated  - continue asa 81 mg daily  - patient has no substernal chest pain and low suspicion for ACS at this time   - troponin negative, no need to trend cardiac enzymes   - cardio following
history of Left heart cath 2019, s/p LAD/ D1 stent in 3/2015, on ASA 81/Plavix at home   - hold plavix for now  - continue asa 81 mg daily  - patient has no substernal chest pain and low suspicion for ACS at this time   - troponin negative, no need to trend cardiac enzymes   - cardio [facility] consulted for clearance for possible ERCP; on DAPT
history of Left heart cath 2019, s/p LAD/ D1 stent in 3/2015, on ASA 81/Plavix at home   - resume plavix for now  - continue asa 81 mg daily  - patient has no substernal chest pain and low suspicion for ACS at this time   - troponin negative, no need to trend cardiac enzymes   - cardio following
history of Left heart cath 2019, s/p LAD/ D1 stent in 3/2015, on ASA 81/Plavix at home   - hold plavix for now  - continue asa 81 mg daily  - patient has no substernal chest pain and low suspicion for ACS at this time   - troponin negative, no need to trend cardiac enzymes   - cardio [facility] group consulted for clearance for possible ERCP; on DAPT

## 2021-05-31 NOTE — PROGRESS NOTE ADULT - SUBJECTIVE AND OBJECTIVE BOX
Name: KADY SUAREZ  MRN: [id number]  LOCATION: [facility] 313 W1    ----  Patient is a 89y old  Female who presents with a chief complaint of sepsis (31 May 2021 16:20)      FROM ADMISSION H+P:   HPI:  89 yo F PMHx T2DM, HTN, HLD, GERD, CAD (s/p LAD 2019 with stents placed on DAPT) brought in by Daughter for fever, weakness. Patient states that she fell a week ago and since then she has been having pain in her left arm and left chest. Patient also complaining of constipation, feeling as if she cannot have a bowel movement.     History taken by the daughter who lives with her and is her primary caretaker. Patient had a mechanical fall May 12th in the grocery store.As per the daughter, patient was in the grocery store. Upon leaving the store, the patient seemed to trip and fall on her left side, did not strike her head. Daughter said she was in some pain at the time, and gave her Bengay cream for the left arm pain, no swelling noted at the time. The following day, she found that her mother had severe swelling from her shoulder to her hand, and bruising. Patient was also complaining of severe pain. Daughter brought her to an orthopedist, who she follows for chronic shoulder pain. X rays were done and daughter states that she was sent home. Patient was complaining of severe pain, and inability to  her arm and complaining of left sided rib pain, worse with inhalation. Daughter brought her to [facility] in [place] who prescribed her oxycodone and a five day course of prednisone for the pain. She completed the five day course and was taking oxycodone tablets once a day at bedtime everyday within adequate control of the pain. Yesterday, patient complaining of decreased appetite, appeared more lethargic than usual. Patient was tolerating small bites of crackers, toast. Last night, daughter took her blood pressure and it was 140/70. This morning, daughter checked her temperature which was 101. She gave her tyelenol X 1, which improved the fever. Patient had an appointment to the hand specialist referred to be orthopedist and upon arriving to the office, patient vomited and was not feeling well. Daughter then brought her to the ER instead.     ----  INTERVAL HPI/OVERNIGHT EVENTS: Pt seen and evaluated at the bedside. No acute overnight events occurred. The pt's daughter is present at bedside.     ----  PAST MEDICAL & SURGICAL HISTORY:  Diabetes mellitus    CAD (coronary artery disease)  2 cardiac stents placed    Hypertension    Hyperlipidemia    Bilateral cataracts        FAMILY HISTORY:  FH: MI (myocardial infarction) (Child)        Allergies    No Known Allergies    Intolerances        ----  ANTIMICROBIALS:  meropenem  IVPB 1000 milliGRAM(s) IV Intermittent every 8 hours    CARDIOVASCULAR:  ATENolol  Tablet 25 milliGRAM(s) Oral daily  isosorbide   mononitrate ER Tablet (IMDUR) 30 milliGRAM(s) Oral daily    GASTROINTESTINAL:  pantoprazole    Tablet 40 milliGRAM(s) Oral before breakfast  polyethylene glycol 3350 17 Gram(s) Oral daily PRN  senna 2 Tablet(s) Oral at bedtime  sucralfate 1 Gram(s) Oral every 12 hours    PULMONARY:      ----  REVIEW OF SYSTEMS:  CONSTITUTIONAL: denies fever, chills, fatigue, weakness  HEENT: denies blurred vision, sore throat  CARDIOVASCULAR: denies chest pain, chest pressure, palpitations  RESPIRATORY: denies shortness of breath, sputum production  GASTROINTESTINAL: denies nausea, vomiting, diarrhea, abdominal pain  NEUROLOGICAL: denies numbness, headache, focal weakness  MUSCULOSKELETAL: denies new joint pain, muscle aches    ----  PHYSICAL EXAM:  GENERAL: patient appears well, no acute distress  ENMT: oropharynx clear without erythema, moist mucous membranes  LUNGS: good air entry bilaterally, clear to auscultation, symmetric breath sounds, no wheezing or rhonchi appreciated  HEART: soft S1/S2, regular rate and rhythm, no murmurs noted, no noted edema to b/l LE  GASTROINTESTINAL: abdomen is soft, nontender, nondistended, normoactive bowel sounds, no palpable masses  MUSCULOSKELETAL: no clubbing or cyanosis, no obvious deformity  NEUROLOGIC: awake, alert, readily interactive, good muscle tone in 4 extremities    T(C): 36.4 (05-31-21 @ 17:29), Max: 36.6 (05-31-21 @ 04:23)  HR: 58 (05-31-21 @ 17:29) (58 - 63)  BP: 129/63 (05-31-21 @ 17:29) (99/51 - 169/79)  RR: 18 (05-31-21 @ 17:29) (18 - 18)  SpO2: 93% (05-31-21 @ 17:29) (93% - 96%)  Wt(kg): --    ----  INTAKE & OUTPUT:  I&O's Summary    30 May 2021 07:01  -  31 May 2021 07:00  --------------------------------------------------------  IN: 300 mL / OUT: 0 mL / NET: 300 mL        LABS:                        10.7   6.26  )-----------( 347      ( 30 May 2021 07:46 )             34.4     05-30    140  |  106  |  19  ----------------------------<  103<H>  4.0   |  28  |  0.59    Ca    8.7      30 May 2021 07:46  Mg     2.1     05-30          CAPILLARY BLOOD GLUCOSE      POCT Blood Glucose.: 136 mg/dL (30 May 2021 21:05)          ----  Personally reviewed:  Vital sign trends: [  ] yes    [  ] no     [  ] n/a  Laboratory results: [  ] yes    [  ] no     [  ] n/a  Radiology results: [  ] yes    [  ] no     [  ] n/a  Microbio results: [  ] yes    [  ] no     [  ] n/a  Consultant recommendations: [  ] yes    [  ] no     [  ] n/a         Name: KADY SUAREZ  MRN: [id number]  LOCATION: [facility] 313 W1    ----  Patient is a 89y old  Female who presents with a chief complaint of sepsis (31 May 2021 16:20)      FROM ADMISSION H+P:   HPI:  88 yo F PMHx T2DM, HTN, HLD, GERD, CAD (s/p LAD 2019 with stents placed on DAPT) brought in by Daughter for fever, weakness. Patient states that she fell a week ago and since then she has been having pain in her left arm and left chest. Patient also complaining of constipation, feeling as if she cannot have a bowel movement.     History taken by the daughter who lives with her and is her primary caretaker. Patient had a mechanical fall May 12th in the grocery store.As per the daughter, patient was in the grocery store. Upon leaving the store, the patient seemed to trip and fall on her left side, did not strike her head. Daughter said she was in some pain at the time, and gave her Bengay cream for the left arm pain, no swelling noted at the time. The following day, she found that her mother had severe swelling from her shoulder to her hand, and bruising. Patient was also complaining of severe pain. Daughter brought her to an orthopedist, who she follows for chronic shoulder pain. X rays were done and daughter states that she was sent home. Patient was complaining of severe pain, and inability to  her arm and complaining of left sided rib pain, worse with inhalation. Daughter brought her to [facility] in [place] who prescribed her oxycodone and a five day course of prednisone for the pain. She completed the five day course and was taking oxycodone tablets once a day at bedtime everyday within adequate control of the pain. Yesterday, patient complaining of decreased appetite, appeared more lethargic than usual. Patient was tolerating small bites of crackers, toast. Last night, daughter took her blood pressure and it was 140/70. This morning, daughter checked her temperature which was 101. She gave her tyelenol X 1, which improved the fever. Patient had an appointment to the hand specialist referred to be orthopedist and upon arriving to the office, patient vomited and was not feeling well. Daughter then brought her to the ER instead.     ----  INTERVAL HPI/OVERNIGHT EVENTS: Pt seen and evaluated at the bedside. No acute overnight events occurred. The pt's daughter is present at bedside to provide interpretation for our interaction. The pt reports feeling unsteady on her feet and weak from deconditioning. Pain is well controlled. She has no other complaints at ths time. Denies Cp, palpitations. Denies dyspnea or cough. Denies abd pain or nausea    ----  PAST MEDICAL & SURGICAL HISTORY:  Diabetes mellitus    CAD (coronary artery disease)  2 cardiac stents placed    Hypertension    Hyperlipidemia    Bilateral cataracts        FAMILY HISTORY:  FH: MI (myocardial infarction) (Child)        Allergies    No Known Allergies    Intolerances        ----  ANTIMICROBIALS:  meropenem  IVPB 1000 milliGRAM(s) IV Intermittent every 8 hours    CARDIOVASCULAR:  ATENolol  Tablet 25 milliGRAM(s) Oral daily  isosorbide   mononitrate ER Tablet (IMDUR) 30 milliGRAM(s) Oral daily    GASTROINTESTINAL:  pantoprazole    Tablet 40 milliGRAM(s) Oral before breakfast  polyethylene glycol 3350 17 Gram(s) Oral daily PRN  senna 2 Tablet(s) Oral at bedtime  sucralfate 1 Gram(s) Oral every 12 hours    PULMONARY:      ----  REVIEW OF SYSTEMS:  comprehensive ROS as per HPI     ----  PHYSICAL EXAM:  GENERAL: patient appears comfortable, sitting in chair at bedside  ENMT: oropharynx clear without erythema, moist mucous membranes  LUNGS: good air entry bilaterally, clear to auscultation, symmetric breath sounds, no wheezing or rhonchi appreciated  HEART: soft S1/S2, regular rate and rhythm, no murmurs noted, no noted edema to b/l LE  GASTROINTESTINAL: abdomen is soft, nontender, nondistended, normoactive bowel sounds, no palpable masses  MUSCULOSKELETAL: L arm in sling  VASC: DP 2+ b/l  NEUROLOGIC: awake, alert, readily interactive, good muscle tone in 4 extremities    T(C): 36.4 (05-31-21 @ 17:29), Max: 36.6 (05-31-21 @ 04:23)  HR: 58 (05-31-21 @ 17:29) (58 - 63)  BP: 129/63 (05-31-21 @ 17:29) (99/51 - 169/79)  RR: 18 (05-31-21 @ 17:29) (18 - 18)  SpO2: 93% (05-31-21 @ 17:29) (93% - 96%)  Wt(kg): --    ----  INTAKE & OUTPUT:  I&O's Summary    30 May 2021 07:01  -  31 May 2021 07:00  --------------------------------------------------------  IN: 300 mL / OUT: 0 mL / NET: 300 mL        LABS:                        10.7   6.26  )-----------( 347      ( 30 May 2021 07:46 )             34.4     05-30    140  |  106  |  19  ----------------------------<  103<H>  4.0   |  28  |  0.59    Ca    8.7      30 May 2021 07:46  Mg     2.1     05-30          CAPILLARY BLOOD GLUCOSE      POCT Blood Glucose.: 136 mg/dL (30 May 2021 21:05)

## 2021-05-31 NOTE — PROGRESS NOTE ADULT - PROBLEM SELECTOR PROBLEM 10
Acute kidney injury superimposed on CKD

## 2021-05-31 NOTE — PROGRESS NOTE ADULT - PROBLEM SELECTOR PLAN 3
s/p mechanical fall with X ray left elbow: Distal LEFT humerus T-shaped intra-articular, intercondylar fracture deformity    - pain control: Tylenol for mild, oxy 5 mg for moderate and 10 mg for severe   - ortho recs noted, appreciated  - PT consulted
s/p mechanical fall with X ray left elbow: Distal LEFT humerus T-shaped intra-articular, intercondylar fracture deformity.  - seen and splinted by orthopedics in the ED  - pain control: tylenol for mild, oxy 5 mg for moderate and 10 mg for severe  - fu CT Left Elbow, orthopedic recs appreciated   - PT consulted
s/p mechanical fall with X ray left elbow: Distal LEFT humerus T-shaped intra-articular, intercondylar fracture deformity.  - seen and splinted by orthopedics in the ED  - pain control: tylenol for mild, oxy 5 mg for moderate and 10 mg for severe  - fu CT Left Elbow, orthopedic recs appreciated   - PT consulted
s/p mechanical fall with X ray left elbow: Distal LEFT humerus T-shaped intra-articular, intercondylar fracture deformity    - pain control: Tylenol for mild, oxy 5 mg for moderate and 10 mg for severe   - ortho recs noted, appreciated  - PT consulted
s/p mechanical fall with X ray left elbow: Distal LEFT humerus T-shaped intra-articular, intercondylar fracture deformity.  - seen and splinted by orthopedics in the ED  - pain control: tylenol for mild, oxy 5 mg for moderate and 10 mg for severe  - fu CT Left Elbow, orthopedic recs appreciated   - PT consulted

## 2021-05-31 NOTE — PROGRESS NOTE ADULT - SUBJECTIVE AND OBJECTIVE BOX
INTERVAL HPI/OVERNIGHT EVENTS:  pt seen and examined   No acute events overnight    MEDICATIONS  (STANDING):  aspirin enteric coated 81 milliGRAM(s) Oral daily  dextrose 40% Gel 15 Gram(s) Oral once  dextrose 5%. 1000 milliLiter(s) (50 mL/Hr) IV Continuous <Continuous>  dextrose 5%. 1000 milliLiter(s) (100 mL/Hr) IV Continuous <Continuous>  dextrose 50% Injectable 25 Gram(s) IV Push once  dextrose 50% Injectable 12.5 Gram(s) IV Push once  dextrose 50% Injectable 25 Gram(s) IV Push once  glucagon  Injectable 1 milliGRAM(s) IntraMuscular once  heparin   Injectable 5000 Unit(s) SubCutaneous every 12 hours  insulin lispro (ADMELOG) corrective regimen sliding scale   SubCutaneous every 6 hours  melatonin 5 milliGRAM(s) Oral at bedtime  meropenem  IVPB 500 milliGRAM(s) IV Intermittent every 12 hours  pantoprazole    Tablet 40 milliGRAM(s) Oral before breakfast  senna 2 Tablet(s) Oral at bedtime  sucralfate 1 Gram(s) Oral every 12 hours    MEDICATIONS  (PRN):  acetaminophen   Tablet .. 650 milliGRAM(s) Oral every 6 hours PRN Mild Pain (1 - 3)  hydrALAZINE Injectable 10 milliGRAM(s) IV Push every 6 hours PRN For sbp>140  morphine  - Injectable 2 milliGRAM(s) IV Push every 6 hours PRN Severe Pain (7 - 10)  ondansetron Injectable 4 milliGRAM(s) IV Push every 8 hours PRN Nausea and/or Vomiting  polyethylene glycol 3350 17 Gram(s) Oral daily PRN Constipation      Allergies  No Known Allergies    Intolerances    ROS:  General:  No wt loss, + fevers, chills, night sweats, fatigue,   Eyes:  Good vision, no reported pain  ENT:  No sore throat, pain, runny nose, dysphagia  CV:  No pain, palpitations, hypo/hypertension  Resp:  No dyspnea, cough, tachypnea, wheezing  GI:  No pain, No nausea, No vomiting, No diarrhea, No constipation, No weight loss, No fever, No pruritis, No rectal bleeding, No tarry stools, No dysphagia,  :  No pain, bleeding, incontinence, nocturia  Muscle:  No pain, weakness  Neuro:  No weakness, tingling, memory problems  Psych:  No fatigue, insomnia, mood problems, depression  Endocrine:  No polyuria, polydipsia, cold/heat intolerance  Heme:  No petechiae, ecchymosis, easy bruisability  Skin:  No rash, tattoos, scars, edema      Vital Signs Last 24 Hrs  T(C): 36.6 (26 May 2021 08:10), Max: 36.8 (25 May 2021 13:40)  T(F): 97.8 (26 May 2021 08:10), Max: 98.3 (25 May 2021 13:40)  HR: 66 (26 May 2021 08:10) (59 - 66)  BP: 166/72 (26 May 2021 08:10) (160/72 - 189/79)  BP(mean): --  RR: 18 (26 May 2021 08:10) (17 - 18)  SpO2: 97% (26 May 2021 08:10) (94% - 97%)    GENERAL:  Appears stated age,   HEENT:  NC/AT,    CHEST:  Full & symmetric excursion,   HEART:  Regular rhythm  ABDOMEN:  Soft, non-tender, non-distended,   EXTEREMITIES:  no cyanosis,clubbing or edema  SKIN:  No rash  NEURO:  Alert,          LABS:                        10.6   5.58  )-----------( 308      ( 26 May 2021 06:55 )             32.4     05-    139  |  105  |  13  ----------------------------<  99  3.7   |  26  |  0.69    Ca    8.4<L>      26 May 2021 06:55    TPro  6.3  /  Alb  2.7<L>  /  TBili  0.8  /  DBili  x   /  AST  39<H>  /  ALT  44  /  AlkPhos  159<H>  05-    PT/INR - ( 24 May 2021 15:29 )   PT: 13.6 sec;   INR: 1.17 ratio         PTT - ( 24 May 2021 15:29 )  PTT:26.6 sec  Urinalysis Basic - ( 25 May 2021 20:55 )    Color: Yellow / Appearance: Clear / S.010 / pH: x  Gluc: x / Ketone: Negative  / Bili: Negative / Urobili: Negative   Blood: x / Protein: Negative / Nitrite: Negative   Leuk Esterase: Negative / RBC: x / WBC x   Sq Epi: x / Non Sq Epi: x / Bacteria: x      Culture - Blood (collected 24 May 2021 22:07)  Source: .Blood Blood-Peripheral  Gram Stain (25 May 2021 10:05):    Growth in aerobic bottle: Gram Negative Rods  Preliminary Report (25 May 2021 11:07):    Growth in aerobic bottle: Gram Negative Rods    MDRO detected in BCID PCR, resistance marker = CTX-M (ESBL)    ***Blood Panel PCR results on this specimen are available    approximately 3 hours after the Gram stain result.***    Gram stain, PCR, and/or culture results may not always    correspond due to difference in methodologies.    ************************************************************    This PCR assay was performed by multiplex PCR. This    Assay tests for 66 bacterial and resistance gene targets.    Please refer to the [facility] test directory    at [email or web address] for details.  Organism: Blood Culture PCR (25 May 2021 11:07)  Organism: Blood Culture PCR (25 May 2021 11:07)    Culture - Blood (collected 24 May 2021 22:07)  Source: .Blood Blood-Peripheral  Preliminary Report (25 May 2021 23:01):    No growth to date.        RADIOLOGY & ADDITIONAL TESTS:  EXAM:  MR MRCP                        PROCEDURE DATE:  2021    INTERPRETATION:  CLINICAL INFORMATION: Transaminitis and sepsis. Dilated common bile duct.  COMPARISON: No prior abdominal MR is available for comparison. Reference is made with a previous abdominal/pelvic CT dated 2021  CONTRAST/COMPLICATIONS:  IV Contrast: NONE  Oral Contrast: NONE  Complications: None reported at time of study completion    PROCEDURE:  MRI/MRCP was performed. Radial and 3D MRCP sequences were obtained.    FINDINGS:  LOWER CHEST: Trace left pleural effusion.  LIVER: Within normal limits.  BILE DUCTS: The common bile duct is dilated measuring 1.4 cm in diameter with gradual tapering distally. No evidence for choledocholithiasis.  GALLBLADDER: Gallstones in the gallbladder. No evidence for thickened gallbladder wall. Small pericholecystic fluid. If there is a clinical suspicion for acute cholecystitis, HIDA scan may be pursued for further evaluation.  SPLEEN: Within normal limits.  PANCREAS: No pancreatic mass is identified. Pancreas divisum.  ADRENALS: Within normal limits.  KIDNEYS/URETERS: Small brightly T2 hyperintense lesions in the kidneys bilaterally, representing probable cysts.    VISUALIZED PORTIONS:  BOWEL: Within normal limits.  PERITONEUM: Small ascites.  VESSELS: Within normal limits.  RETROPERITONEUM/LYMPH NODES: No lymphadenopathy.  ABDOMINAL WALL: Within normal limits.  BONES: Degenerative spondylosis.    IMPRESSION:  Dilated common bile duct measuring 1.4 cm in diameter with gradual tapering distally. No evidence for choledocholithiasis.  Pancreas divisum.  Gallstones in the gallbladder. No evidence for thickened gallbladder wall. Small pericholecystic fluid. If there is a clinical suspicion for acute cholecystitis, HIDA scan may be pursued for further evaluation.  Small ascites.  Trace left pleural effusion.

## 2021-05-31 NOTE — PROGRESS NOTE ADULT - PROBLEM SELECTOR PLAN 10
BUN/Cr: 36/1.8 likely prerenal 2/2 to sepsis, volume depletion  - suspect HILLARY, prerenal, now resolved    11. Constipation likely 2/2 to immobilization and opiates constipation resolved    12. Need for prophylactic measures  - c/w heparin subQ for DVT prophylaxis
BUN/Cr: 36/1.8 likely prerenal 2/2 to sepsis, volume depletion  - suspect HILLARY, prerenal, now resolved    11. Constipation likely 2/2 to immobilization and opiates constipation resolved    12. Need for prophylactic measures  - c/w heparin subQ for DVT prophylaxis
BUN/Cr: 36/1.8 likely prerenal 2/2 to sepsis, volume depletion  - start IVF maintenance   - eGFR noted to be 25, possible component of chronic kidney disease; continue to monitor renal function, hold   nephrotoxic agents, avoid NSAIDs    11. Constipation likely 2/2 to immobilization and opiates   - stool burden noted on CT A/P   - patient c/o no BM 3 days   - will give fleet enema  - bowel regimen senna/miralax    11. Need for prophylactic measures  - start heparin subQ for DVT prophylaxis
BUN/Cr: 36/1.8 likely prerenal 2/2 to sepsis, volume depletion  - eGFR noted to be 25, possible component of chronic kidney disease; continue to monitor renal function, hold   nephrotoxic agents, avoid NSAIDs    11. Constipation likely 2/2 to immobilization and opiates   - stool burden noted on CT A/P   - patient c/o no BM 3 days   - will give fleet enema  - bowel regimen senna/miralax    12. Need for prophylactic measures  - start heparin subQ for DVT prophylaxis
BUN/Cr: 36/1.8 likely prerenal 2/2 to sepsis, volume depletion  - suspect HILLARY, prerenal, now resolved    11. Constipation likely 2/2 to immobilization and opiates constipation resolved    12. Need for prophylactic measures  - c/w heparin subQ for DVT prophylaxis
BUN/Cr: 36/1.8 likely prerenal 2/2 to sepsis, volume depletion  - eGFR noted to be 25, possible component of chronic kidney disease; continue to monitor renal function, hold   nephrotoxic agents, avoid NSAIDs    11. Constipation likely 2/2 to immobilization and opiates   - stool burden noted on CT A/P   - patient c/o no BM 3 days   - will give fleet enema  - bowel regimen senna/miralax    12. Need for prophylactic measures  - start heparin subQ for DVT prophylaxis
BUN/Cr: 36/1.8 likely prerenal 2/2 to sepsis, volume depletion  - eGFR noted to be 25, possible component of chronic kidney disease; continue to monitor renal function, hold   nephrotoxic agents, avoid NSAIDs    11. Constipation likely 2/2 to immobilization and opiates constipation resolved    12. Need for prophylactic measures  - c/w heparin subQ for DVT prophylaxis

## 2021-05-31 NOTE — PROGRESS NOTE ADULT - ASSESSMENT
dilated cbd  elevated lfts  fever    reg diet  IV antibiotics as per ID   Bld cx noted  f/u repeat cultures  MRCP noted  HIDA hegative  suspect passed stone  repeat cultures negative  no need for ercp  will follow  dc planning per primary        Advanced care planning was discussed with patient and family.  Advanced care planning forms were reviewed and discussed.  Risks, benefits and alternatives of gastroenterologic procedures were discussed in detail and all questions were answered.    30 minutes spent.

## 2021-05-31 NOTE — PROGRESS NOTE ADULT - SUBJECTIVE AND OBJECTIVE BOX
· Subjective and Objective:   [facility] CARDIOLOGY CONSULTANTS:    Marsha Agarwal, Arleth, Vanessa, Damian Lemus, Gayathri Ordoñez      [phone]    CHIEF COMPLAINT: Patient is a 89y old  Female who presents with a chief complaint of sepsis (30 May 2021 14:22)    Follow Up: Sepsis, Hx of CAD stents with CBD stone for Cardiac clearance     Subjective/Observations: Patient seen and examined. Patient awake, alert, resting in bed. No complaints of chest pain, dyspnea, palpitations or dizziness. No signs of orthopnea or PND. Lt arm on sling.  Tolerating room air.     REVIEW OF SYSTEMS: All review of systems is negative for eye, ENT, GI, , allergic, dermatologic, musculoskeletal and neurologic except as described above        PAST MEDICAL & SURGICAL HISTORY:  Diabetes mellitus    CAD (coronary artery disease)  2 cardiac stents placed    Hypertension    Hyperlipidemia    Bilateral cataracts        MEDICATIONS  (STANDING):  aspirin enteric coated 81 milliGRAM(s) Oral daily  ATENolol  Tablet 25 milliGRAM(s) Oral daily  heparin   Injectable 5000 Unit(s) SubCutaneous every 12 hours  isosorbide   mononitrate ER Tablet (IMDUR) 30 milliGRAM(s) Oral daily  melatonin 5 milliGRAM(s) Oral at bedtime  meropenem  IVPB 1000 milliGRAM(s) IV Intermittent every 8 hours  pantoprazole    Tablet 40 milliGRAM(s) Oral before breakfast  senna 2 Tablet(s) Oral at bedtime  sucralfate 1 Gram(s) Oral every 12 hours      Allergies    No Known Allergies    Intolerances                              10.7   6.26  )-----------( 347      ( 30 May 2021 07:46 )             34.4       05-30    140  |  106  |  19  ----------------------------<  103<H>  4.0   |  28  |  0.59    Ca    8.7      30 May 2021 07:46  Mg     2.1     05-30                                  Daily     Daily Weight in k.5 (31 May 2021 04:23)    I&O's Summary    30 May 2021 07:01  -  31 May 2021 07:00  --------------------------------------------------------  IN: 300 mL / OUT: 0 mL / NET: 300 mL        Vital Signs Last 24 Hrs  T(C): 36.4 (31 May 2021 10:21), Max: 36.8 (30 May 2021 13:23)  T(F): 97.6 (31 May 2021 10:21), Max: 98.3 (30 May 2021 13:23)  HR: 63 (31 May 2021 10:) (58 - 80)  BP: 117/65 (31 May 2021 10:21) (95/52 - 169/79)  BP(mean): --  RR: 18 (31 May 2021 10:21) (18 - 19)  SpO2: 95% (31 May 2021 10:21) (94% - 96%)    PHYSICAL EXAM:   · Constitutional	Well-developed, well nourished  · Eyes	EOMI; PERRL; no drainage or redness  · ENMT	No oral lesions; no gross abnormalities  · Neck	No bruits; no thyromegaly or nodules  · Respiratory	Normal breath sounds b/l, No RRW  · Cardiovascular	Regular rate & rhythm, normal S1, S2; no murmurs, gallops or rubs; no S3, S4  · Gastrointestinal	Soft, non-tender, no hepatosplenomegaly, normal bowel sounds  · Extremities	No cyanosis, clubbing or edema  · Vascular	Equal and normal pulses (carotid, femoral, dorsalis pedis)  · Neurological	Alert & oriented; no sensory, motor or coordination deficits, normal reflexes

## 2021-05-31 NOTE — PROGRESS NOTE ADULT - PROBLEM SELECTOR PROBLEM 1
Cholelithiasis
Cholelithiasis with obstruction

## 2021-05-31 NOTE — PROGRESS NOTE ADULT - PROBLEM SELECTOR PROBLEM 3
Elbow fracture, left

## 2021-05-31 NOTE — PROGRESS NOTE ADULT - PROBLEM SELECTOR PLAN 9
Hold Metformin   - continue low dose insulin sliding scale with accuchecks and hypoglycemia protocol  - follow up  hemoglobin A1C
Hold Metformin   - dc fingersticks. resume metformin on dc
Hold Metformin   - continue low dose insulin sliding scale with accuchecks and hypoglycemia protocol  - follow up  hemoglobin A1C
Hold Metformin   - dc fingersticks. resume metformin on dc
Hold Metformin   - continue low dose insulin sliding scale with accuchecks and hypoglycemia protocol  - follow up  hemoglobin A1C 6.0
Hold Metformin   - continue low dose insulin sliding scale with accuchecks and hypoglycemia protocol  - follow up  hemoglobin A1C
Hold Metformin   - continue low dose insulin sliding scale with accuchecks and hypoglycemia protocol  - follow up  hemoglobin A1C 6.0

## 2021-05-31 NOTE — PROGRESS NOTE ADULT - PROBLEM SELECTOR PLAN 4
On isosorbide dinitrate, losartan, atenolol at home   - resume imdur, atenolol  - hold losartan for now, BP this evening in 110's  - dc ivf
On isosorbide dinitrate, losartan, atenolol at home   - resume imdur, atenolol  - cardio recs noted  - BP's too low this afternoon to increase BB. hydralazine was d/c'd.   - dc ivf  Active cardiovascular meds:  ATENolol  Tablet 25 milliGRAM(s) Oral daily  isosorbide   mononitrate ER Tablet (IMDUR) 30 milliGRAM(s) Oral daily  - will continue w/ holding parameters as indicated
On isosorbide dinitrate, losartan, atenolol at home   - resume imdur, atenolol  - cardio recs noted  Active cardiovascular meds:  ATENolol  Tablet 25 milliGRAM(s) Oral daily  isosorbide   mononitrate ER Tablet (IMDUR) 30 milliGRAM(s) Oral daily  added losartan   - will continue w/ holding parameters as indicated
On isosorbide dinitrate, losartan, atenolol at home   - HOLD antihypertensives 2/2 to sepsis with profound hypotension  - continue to follow blood pressure trend, reinitiate BP meds as needed  - continue IVF maintenance
On isosorbide dinitrate, losartan, atenolol at home   - resume imdur, atenolol  - dc ivf

## 2021-05-31 NOTE — PROGRESS NOTE ADULT - PROBLEM SELECTOR PLAN 2
BP noted to be 66/47, Lactate 2.5 on admission, febrile 101 at home; given 2L fluids in ED  - likely secondary to cholangitis; CT showed Cholelithiasis. Dilated common bile duct.  - continue IV zosyn to cover intraabdominal luba  - maintenance IVF Ns @75 cc/hour  - NPO for now except medications  - follow up repeat lactate  - follow up blood/urine cultures  - tyelenol PRN for fever
as above. sepsis resolved.
as above. sepsis resolved.
BP noted to be 66/47, Lactate 2.5 on admission, febrile 101 at home; given 2L fluids in ED  - likely secondary to cholangitis; CT showed Cholelithiasis. Dilated common bile duct.  - diet adevanced  - blood cultures c/w ESBL E coli bacteremia with most compelling localization being the biliary system. Antibx escalated to meropenem. repeat blood cultures this am  - follow up blood/urine cultures  - tyelenol PRN for fever
- likely secondary to cholangitis; CT showed Cholelithiasis. Dilated common bile duct.  - diet advanced  - blood cultures c/w ESBL E coli bacteremia with most compelling localization being the biliary system. Antibx escalated to meropenem. repeat blood cultures cleared   - Tylenol PRN for fever
BP noted to be 66/47, Lactate 2.5 on admission, febrile 101 at home; given 2L fluids in ED  - likely secondary to cholangitis; CT showed Cholelithiasis. Dilated common bile duct.  - NPO for now except medications  - blood cultures c/w ESBL E coli bacteremia with most compelling localization being the biliary system. Antibx escalated to meropenem. repeat blood cultures this am  - follow up blood/urine cultures  - tyelenol PRN for fever
- likely secondary to cholangitis; CT showed Cholelithiasis. Dilated common bile duct.  - diet advanced  - blood cultures c/w ESBL E coli bacteremia with most compelling localization being the biliary system. Antibx escalated to meropenem. repeat blood cultures cleared   - Tylenol PRN for fever

## 2021-05-31 NOTE — PROGRESS NOTE ADULT - PROBLEM SELECTOR PLAN 1
LFTs improving   follow up HIDA scan     Sx [id number]
CT showed showed Cholelithiasis. Dilated common bile duct, with elevated Tbili and transaminitis   - HIDA negative  - ESBL e.coli likely from gut translocation. Cultures now cleared  - LFT's improving   - GI Hernandez following  - Surgery signed off
CT showed showed Cholelithiasis. Dilated common bile duct, with elevated Tbili and transaminitis   - HIDA negative  - ESBL e.coli likely from gut translocation. Cultures now cleared  - LFT's improving   - GI Hernandez following  - Surgery signed off
CT showed showed Cholelithiasis. Dilated common bile duct, with elevated Tbili and transaminitis   - HIDA negative  - blood cultures w ESBL E coli bacteremia with most compelling localization being the biliary system. Antibx escalated to meropenem. repeat blood cultures ngtd  - MRCP with dilated ducts and cholelithiasis, has no RUQ pain, negative Mittal's sign   - LFT's improving   - GI Hernandez following f/u recs   - Surgery signed off. with no plan for surgical intervention will resume plavix
CT showed showed Cholelithiasis. Dilated common bile duct, with elevated Tbili and transaminitis   - NPO, for HIDA, on IV fluids.  No fever.   - blood cultures c/w ESBL E coli bacteremia with most compelling localization being the biliary system. Antibx escalated to meropenem. repeat blood cultures this am  - MRCP with dilated ducts and cholelithiasis, has no RUQ pain, negative Mittal's sign.  - LFT's improving   - GI Hernandez following f/u recs
CT showed showed Cholelithiasis. Dilated common bile duct, with fever, vomiting and decreased appetite; with elevated Tbili and transaminitis   - NPO, IV fluids  - given high fever at home, there is a concern for cholangitis - c/w zosyn  - follow up blood cultures  - will likely need MRCP +/- ERCP, f/u AM LFTs/bili  - SAADIA Hernandez consulted; f/u recs
CT showed showed Cholelithiasis. Dilated common bile duct, with elevated Tbili and transaminitis   - HIDA negative  - blood cultures w ESBL E coli bacteremia with most compelling localization being the biliary system. Antibx escalated to meropenem. repeat blood cultures ngtd  - MRCP with dilated ducts and cholelithiasis, has no RUQ pain, negative Mittal's sign   - LFT's improving   - GI Hernandez following f/u recs   - Surgery signed off. with no plan for surgical intervention will resume plavix
CT showed showed Cholelithiasis. Dilated common bile duct, with elevated Tbili and transaminitis   - HIDA negative  - blood cultures w ESBL E coli bacteremia with most compelling localization being the biliary system. Antibx escalated to meropenem. repeat blood cultures ngtd  - MRCP with dilated ducts and cholelithiasis, has no RUQ pain, negative Mittal's sign.  - LFT's improving   - GI Hernandez following f/u recs  - Surgery signed off

## 2021-05-31 NOTE — PROGRESS NOTE ADULT - PROBLEM SELECTOR PLAN 5
Continue simvastatin 40 mg at bedtime

## 2021-05-31 NOTE — PROGRESS NOTE ADULT - PROBLEM SELECTOR PROBLEM 2
Sepsis, unspecified organism

## 2021-06-01 ENCOUNTER — TRANSCRIPTION ENCOUNTER (OUTPATIENT)
Age: 86
End: 2021-06-01

## 2021-06-01 VITALS
DIASTOLIC BLOOD PRESSURE: 73 MMHG | RESPIRATION RATE: 18 BRPM | TEMPERATURE: 98 F | OXYGEN SATURATION: 98 % | HEART RATE: 63 BPM | SYSTOLIC BLOOD PRESSURE: 119 MMHG

## 2021-06-01 LAB
ALBUMIN SERPL ELPH-MCNC: 2.9 G/DL — LOW (ref 3.3–5)
ALP SERPL-CCNC: 169 U/L — HIGH (ref 40–120)
ALT FLD-CCNC: 47 U/L — SIGNIFICANT CHANGE UP (ref 12–78)
ANION GAP SERPL CALC-SCNC: 7 MMOL/L — SIGNIFICANT CHANGE UP (ref 5–17)
AST SERPL-CCNC: 40 U/L — HIGH (ref 15–37)
BILIRUB SERPL-MCNC: 0.5 MG/DL — SIGNIFICANT CHANGE UP (ref 0.2–1.2)
BUN SERPL-MCNC: 24 MG/DL — HIGH (ref 7–23)
CALCIUM SERPL-MCNC: 8.7 MG/DL — SIGNIFICANT CHANGE UP (ref 8.5–10.1)
CHLORIDE SERPL-SCNC: 106 MMOL/L — SIGNIFICANT CHANGE UP (ref 96–108)
CK MB BLD-MCNC: 4.8 % — HIGH (ref 0–3.5)
CK MB CFR SERPL CALC: 1.2 NG/ML — SIGNIFICANT CHANGE UP (ref 0–3.6)
CK SERPL-CCNC: 25 U/L — LOW (ref 26–192)
CO2 SERPL-SCNC: 30 MMOL/L — SIGNIFICANT CHANGE UP (ref 22–31)
CREAT SERPL-MCNC: 0.66 MG/DL — SIGNIFICANT CHANGE UP (ref 0.5–1.3)
GLUCOSE SERPL-MCNC: 98 MG/DL — SIGNIFICANT CHANGE UP (ref 70–99)
HCT VFR BLD CALC: 32.6 % — LOW (ref 34.5–45)
HGB BLD-MCNC: 10.3 G/DL — LOW (ref 11.5–15.5)
MAGNESIUM SERPL-MCNC: 2.2 MG/DL — SIGNIFICANT CHANGE UP (ref 1.6–2.6)
MCHC RBC-ENTMCNC: 30.6 PG — SIGNIFICANT CHANGE UP (ref 27–34)
MCHC RBC-ENTMCNC: 31.6 GM/DL — LOW (ref 32–36)
MCV RBC AUTO: 96.7 FL — SIGNIFICANT CHANGE UP (ref 80–100)
NRBC # BLD: 0 /100 WBCS — SIGNIFICANT CHANGE UP (ref 0–0)
PLATELET # BLD AUTO: 352 K/UL — SIGNIFICANT CHANGE UP (ref 150–400)
POTASSIUM SERPL-MCNC: 3.8 MMOL/L — SIGNIFICANT CHANGE UP (ref 3.5–5.3)
POTASSIUM SERPL-SCNC: 3.8 MMOL/L — SIGNIFICANT CHANGE UP (ref 3.5–5.3)
PROT SERPL-MCNC: 6.7 G/DL — SIGNIFICANT CHANGE UP (ref 6–8.3)
RBC # BLD: 3.37 M/UL — LOW (ref 3.8–5.2)
RBC # FLD: 12.6 % — SIGNIFICANT CHANGE UP (ref 10.3–14.5)
SODIUM SERPL-SCNC: 143 MMOL/L — SIGNIFICANT CHANGE UP (ref 135–145)
TROPONIN I SERPL-MCNC: <.015 NG/ML — SIGNIFICANT CHANGE UP (ref 0.01–0.04)
WBC # BLD: 5.66 K/UL — SIGNIFICANT CHANGE UP (ref 3.8–10.5)
WBC # FLD AUTO: 5.66 K/UL — SIGNIFICANT CHANGE UP (ref 3.8–10.5)

## 2021-06-01 PROCEDURE — 86900 BLOOD TYPING SEROLOGIC ABO: CPT

## 2021-06-01 PROCEDURE — 81003 URINALYSIS AUTO W/O SCOPE: CPT

## 2021-06-01 PROCEDURE — 74181 MRI ABDOMEN W/O CONTRAST: CPT

## 2021-06-01 PROCEDURE — 93010 ELECTROCARDIOGRAM REPORT: CPT

## 2021-06-01 PROCEDURE — 85610 PROTHROMBIN TIME: CPT

## 2021-06-01 PROCEDURE — 85025 COMPLETE CBC W/AUTO DIFF WBC: CPT

## 2021-06-01 PROCEDURE — 86850 RBC ANTIBODY SCREEN: CPT

## 2021-06-01 PROCEDURE — U0005: CPT

## 2021-06-01 PROCEDURE — 78226 HEPATOBILIARY SYSTEM IMAGING: CPT

## 2021-06-01 PROCEDURE — 87186 SC STD MICRODIL/AGAR DIL: CPT

## 2021-06-01 PROCEDURE — 73070 X-RAY EXAM OF ELBOW: CPT

## 2021-06-01 PROCEDURE — 87150 DNA/RNA AMPLIFIED PROBE: CPT

## 2021-06-01 PROCEDURE — 83605 ASSAY OF LACTIC ACID: CPT

## 2021-06-01 PROCEDURE — 82553 CREATINE MB FRACTION: CPT

## 2021-06-01 PROCEDURE — 96374 THER/PROPH/DIAG INJ IV PUSH: CPT

## 2021-06-01 PROCEDURE — 99232 SBSQ HOSP IP/OBS MODERATE 35: CPT

## 2021-06-01 PROCEDURE — 86901 BLOOD TYPING SEROLOGIC RH(D): CPT

## 2021-06-01 PROCEDURE — 85027 COMPLETE CBC AUTOMATED: CPT

## 2021-06-01 PROCEDURE — 83690 ASSAY OF LIPASE: CPT

## 2021-06-01 PROCEDURE — 99285 EMERGENCY DEPT VISIT HI MDM: CPT | Mod: 25

## 2021-06-01 PROCEDURE — 71045 X-RAY EXAM CHEST 1 VIEW: CPT

## 2021-06-01 PROCEDURE — 99239 HOSP IP/OBS DSCHRG MGMT >30: CPT

## 2021-06-01 PROCEDURE — 83880 ASSAY OF NATRIURETIC PEPTIDE: CPT

## 2021-06-01 PROCEDURE — 93005 ELECTROCARDIOGRAM TRACING: CPT

## 2021-06-01 PROCEDURE — 82962 GLUCOSE BLOOD TEST: CPT

## 2021-06-01 PROCEDURE — 73200 CT UPPER EXTREMITY W/O DYE: CPT

## 2021-06-01 PROCEDURE — 82550 ASSAY OF CK (CPK): CPT

## 2021-06-01 PROCEDURE — 80053 COMPREHEN METABOLIC PANEL: CPT

## 2021-06-01 PROCEDURE — 73090 X-RAY EXAM OF FOREARM: CPT

## 2021-06-01 PROCEDURE — 87086 URINE CULTURE/COLONY COUNT: CPT

## 2021-06-01 PROCEDURE — 84484 ASSAY OF TROPONIN QUANT: CPT

## 2021-06-01 PROCEDURE — 36415 COLL VENOUS BLD VENIPUNCTURE: CPT

## 2021-06-01 PROCEDURE — 85730 THROMBOPLASTIN TIME PARTIAL: CPT

## 2021-06-01 PROCEDURE — 83735 ASSAY OF MAGNESIUM: CPT

## 2021-06-01 PROCEDURE — 73060 X-RAY EXAM OF HUMERUS: CPT

## 2021-06-01 PROCEDURE — 80048 BASIC METABOLIC PNL TOTAL CA: CPT

## 2021-06-01 PROCEDURE — 97530 THERAPEUTIC ACTIVITIES: CPT

## 2021-06-01 PROCEDURE — 87040 BLOOD CULTURE FOR BACTERIA: CPT

## 2021-06-01 PROCEDURE — U0003: CPT

## 2021-06-01 PROCEDURE — 74176 CT ABD & PELVIS W/O CONTRAST: CPT

## 2021-06-01 PROCEDURE — 76376 3D RENDER W/INTRP POSTPROCES: CPT

## 2021-06-01 PROCEDURE — 97116 GAIT TRAINING THERAPY: CPT

## 2021-06-01 PROCEDURE — 73080 X-RAY EXAM OF ELBOW: CPT

## 2021-06-01 PROCEDURE — 71250 CT THORAX DX C-: CPT

## 2021-06-01 PROCEDURE — 97162 PT EVAL MOD COMPLEX 30 MIN: CPT

## 2021-06-01 PROCEDURE — 83036 HEMOGLOBIN GLYCOSYLATED A1C: CPT

## 2021-06-01 PROCEDURE — A9537: CPT

## 2021-06-01 RX ORDER — SUCRALFATE 1 G
1 TABLET ORAL
Qty: 0 | Refills: 0 | DISCHARGE

## 2021-06-01 RX ORDER — SUCRALFATE 1 G
1 TABLET ORAL
Qty: 120 | Refills: 0
Start: 2021-06-01 | End: 2021-06-30

## 2021-06-01 RX ORDER — METFORMIN HYDROCHLORIDE 850 MG/1
1 TABLET ORAL
Qty: 0 | Refills: 0 | DISCHARGE

## 2021-06-01 RX ADMIN — ATENOLOL 25 MILLIGRAM(S): 25 TABLET ORAL at 06:43

## 2021-06-01 RX ADMIN — PANTOPRAZOLE SODIUM 40 MILLIGRAM(S): 20 TABLET, DELAYED RELEASE ORAL at 06:43

## 2021-06-01 RX ADMIN — Medication 1 GRAM(S): at 06:43

## 2021-06-01 RX ADMIN — HEPARIN SODIUM 5000 UNIT(S): 5000 INJECTION INTRAVENOUS; SUBCUTANEOUS at 06:43

## 2021-06-01 RX ADMIN — MEROPENEM 100 MILLIGRAM(S): 1 INJECTION INTRAVENOUS at 13:26

## 2021-06-01 RX ADMIN — LOSARTAN POTASSIUM 50 MILLIGRAM(S): 100 TABLET, FILM COATED ORAL at 06:43

## 2021-06-01 RX ADMIN — MEROPENEM 100 MILLIGRAM(S): 1 INJECTION INTRAVENOUS at 06:42

## 2021-06-01 RX ADMIN — Medication 81 MILLIGRAM(S): at 13:26

## 2021-06-01 NOTE — PROVIDER CONTACT NOTE (OTHER) - SITUATION
Pt Janelle Speaking. While on phone with , pt states she is currently having mild left chest pain.
Pt desating to 70's on 6L O2 NC

## 2021-06-01 NOTE — PROVIDER CONTACT NOTE (OTHER) - BACKGROUND
Admitting dx: acute renal failure  pt was placed on NRB earlier for desating. pt placed back on 6L O2 NC and was tolerating well (see VS flowsheet) and is now desating again.
Pt states this pain has been occurring since the fall, then states she does not know when it started. Patient cannot describe type of pain. Complaints of L chest pain are found in H&P.

## 2021-06-01 NOTE — PROGRESS NOTE ADULT - SUBJECTIVE AND OBJECTIVE BOX
[facility] -- Marsha Agarwal, Arleth, Vanessa, Damian Lemus Savella, Goodger: Office # [phone]    Follow Up:  Sepsis     Subjective/Observations: Patient seen and examined. Patient awake, alert, resting in bed. No complaints of chest pain, dyspnea, palpitations or dizziness. No signs of orthopnea or PND. Did c/o chest pain overnight mostly with movement and cough. No chest pain at rest. Le arm in sling. Tolerating room air.     REVIEW OF SYSTEMS: All review of systems is negative for eye, ENT, GI, , allergic, dermatologic, musculoskeletal and neurologic except as described above    PAST MEDICAL & SURGICAL HISTORY:  Diabetes mellitus    CAD (coronary artery disease)  2 cardiac stents placed    Hypertension    Hypertension    Hyperlipidemia    Bilateral cataracts    MEDICATIONS  (STANDING):  aspirin enteric coated 81 milliGRAM(s) Oral daily  ATENolol  Tablet 25 milliGRAM(s) Oral daily  heparin   Injectable 5000 Unit(s) SubCutaneous every 12 hours  isosorbide   mononitrate ER Tablet (IMDUR) 30 milliGRAM(s) Oral daily  losartan 50 milliGRAM(s) Oral daily  melatonin 5 milliGRAM(s) Oral at bedtime  meropenem  IVPB 1000 milliGRAM(s) IV Intermittent every 8 hours  pantoprazole    Tablet 40 milliGRAM(s) Oral before breakfast  senna 2 Tablet(s) Oral at bedtime  sucralfate 1 Gram(s) Oral every 12 hours    MEDICATIONS  (PRN):  acetaminophen   Tablet .. 650 milliGRAM(s) Oral every 6 hours PRN Mild Pain (1 - 3)  ondansetron Injectable 4 milliGRAM(s) IV Push every 8 hours PRN Nausea and/or Vomiting  polyethylene glycol 3350 17 Gram(s) Oral daily PRN Constipation    Allergies  No Known Allergies    Vital Signs Last 24 Hrs  T(C): 36.6 (01 Jun 2021 05:13), Max: 36.6 (01 Jun 2021 05:13)  T(F): 97.9 (01 Jun 2021 05:13), Max: 97.9 (01 Jun 2021 05:13)  HR: 63 (01 Jun 2021 05:13) (58 - 63)  BP: 136/68 (01 Jun 2021 05:13) (129/63 - 136/68)  BP(mean): --  RR: 18 (01 Jun 2021 05:13) (18 - 18)  SpO2: 94% (01 Jun 2021 05:13) (93% - 94%)  I&O's Summary        TELE: Not on telemetry   PHYSICAL EXAM:  Appearance: NAD, no distress, alert, Well developed   HEENT: Moist Mucous Membranes, Anicteric  Cardiovascular: Regular rate and rhythm, Normal S1 S2, No JVD, No murmurs, No rubs, gallops or clicks  Respiratory: Non-labored, Clear to auscultation, No rales, No rhonchi, No wheezing.   Gastrointestinal:  Soft, Non-tender, + BS  Neurologic: Non-focal  Skin: Warm and dry, No visible rashes or ulcers, No ecchymosis, No cyanosis  Musculoskeletal: Lt arm sling, No clubbing, No cyanosis, No joint swelling/tenderness  Psychiatry: Mood & affect appropriate  Lymph: No peripheral edema.     LABS: All Labs Reviewed:                        10.3   5.66  )-----------( 352      ( 01 Jun 2021 06:06 )             32.6                         10.7   6.26  )-----------( 347      ( 30 May 2021 07:46 )             34.4     01 Jun 2021 06:13    143    |  106    |  24     ----------------------------<  98     3.8     |  30     |  0.66   30 May 2021 07:46    140    |  106    |  19     ----------------------------<  103    4.0     |  28     |  0.59     Ca    8.7        01 Jun 2021 06:13  Ca    8.7        30 May 2021 07:46  Mg     2.2       01 Jun 2021 06:13  Mg     2.1       30 May 2021 07:46    TPro  6.7    /  Alb  2.9    /  TBili  0.5    /  DBili  x      /  AST  40     /  ALT  47     /  AlkPhos  169    01 Jun 2021 06:13  Creatine Kinase, Serum: 25 U/L (06-01-21 @ 06:13)  Troponin I, Serum: <.015 ng/mL (06-01-21 @ 06:13)  Troponin I, Serum: .034 ng/mL (05-24-21 @ 15:29)    12 Lead ECG:   Ventricular Rate 53 BPM  Atrial Rate 53 BPM  P-R Interval 146 ms  QRS Duration 64 ms  Q-T Interval 420 ms  QTC Calculation(Bazett) 394 ms  P Axis 28 degrees  R Axis 27 degrees  T Axis 49 degrees  Diagnosis Line Sinus bradycardia with marked sinus arrhythmia  Otherwise normal ECG  No previous ECGs available  Confirmed by Cheko Mendez MD ([id number]) on 5/25/2021 1:36:03 PM (05-24-21 @ 14:58)

## 2021-06-01 NOTE — DISCHARGE NOTE PROVIDER - CARE PROVIDER_API CALL
Marzena Millan  Your primary care provider  Phone: (   )    -  Fax: (   )    -  Follow Up Time:     Aaron Hernandez (DO)  Gastroenterology; Internal Medicine  [address] [place] [address]  Phone: [phone]  Fax: [phone]  Established Patient  Follow Up Time: 1 month

## 2021-06-01 NOTE — PROVIDER CONTACT NOTE (OTHER) - ACTION/TREATMENT ORDERED:
Dr Malone states he will be up to assess patient. Dr Malone states he will be up to assess patient. Faisal assessed pt and was able to properly communicate with her in Janelle, states chest pain is not new or current. VS WNL. Faisal assessed pt and was able to properly communicate with her in Janelle, states chest pain is not new or current. EKG and Troponin ordered.

## 2021-06-01 NOTE — DISCHARGE NOTE NURSING/CASE MANAGEMENT/SOCIAL WORK - PATIENT PORTAL LINK FT
You can access the FollowMyHealth Patient Portal offered by [facility] by registering at the following website: [email or web address]. By joining [employer]’s FollowMyHealth portal, you will also be able to view your health information using other applications (apps) compatible with our system.

## 2021-06-01 NOTE — DISCHARGE NOTE PROVIDER - PROVIDER TOKENS
FREE:[LAST:[Housseni],FIRST:[Marzena],PHONE:[(   )    -],FAX:[(   )    -],ADDRESS:[Your primary care provider]],PROVIDER:[TOKEN:[[id number]],FOLLOWUP:[1 month],ESTABLISHEDPATIENT:[T]]

## 2021-06-01 NOTE — PROGRESS NOTE ADULT - PROVIDER SPECIALTY LIST ADULT
Cardiology
Gastroenterology
Gastroenterology
Cardiology
Gastroenterology
Cardiology
Cardiology
Gastroenterology
Gastroenterology
Infectious Disease
Cardiology
Cardiology
Hospitalist
Infectious Disease
Infectious Disease
Cardiology
Surgery
Gastroenterology
Hospitalist

## 2021-06-01 NOTE — CHART NOTE - NSCHARTNOTEFT_GEN_A_CORE
Called by RN informing that patient is having mild left sided chest pain. Patient seen and examined at bedside. Patient states that she has been having this mild left sided chest pain for 1 days. clams that pain is 3-4/10 with radiation to her left shoulder further informs that she felt left sided discomfort and decided to inform the RN. Denies shortness of breath, palpitations, abdominal pain, dizziness, headache, or nausea/vomiting      T(C): 36.4 (05-31-21 @ 17:29), Max: 36.4 (05-31-21 @ 10:21)  HR: 58 (05-31-21 @ 17:29) (58 - 63)  BP: 129/63 (05-31-21 @ 17:29) (117/65 - 129/63)  RR: 18 (05-31-21 @ 17:29) (18 - 18)  SpO2: 93% (05-31-21 @ 17:29) (93% - 95%)  Wt(kg): --    Physical Exam:  Gen: well appearing luis miguel speaking female who is in NAD  HEENT: NCAT,   Cardio: regular rate and rhythm, +s1s2, no murmurs, Chest pain reproducible on palpation on the left anterior chest 3-4 inches above left breast. Deep inhalation also increases pain.  Pulm: CTA b/l, no wheezes,  Abdomen: soft, nontender, nondistended, +BS, no guarding  Extremities: no clubbing, cyanosis or edema,   Neuro: AAOx3, CNII-XII intact grossly, 5/5 strength all extremities,   Skin: warm and dry    Assessment/Plan  89 yo F PMHx T2DM, HTN, HLD, GERD, CAD (s/p LAD 2019 with stents placed on DAPT) admitted for sepsis likely due to intra-abdominal pathology, possible cholangitis given CBD dilation and elevated Tbili and LFTs.       - Patient s/p mechanical fall with X ray left elbow: Distal LEFT humerus T-shaped intra-articular, intercondylar fracture deformity  - ACS less likely in the setting of normal vitals with no SOB and diaphoresis.  - Pain seems to be musculoskeletal in nature as it is reproducible on examination.  - Will get STAT EKG and Cardiac enzymes  - Will continue to monitor  -RN to call with changes. Called by RN informing that patient is having mild left sided chest pain. Patient seen and examined at bedside. Patient states that she has been having this mild left sided chest pain for 1 days. clams that pain is 3-4/10 with radiation to her left shoulder, further informs that she felt sudden onset of left sided discomfort and decided to inform the RN. Denies shortness of breath, palpitations, abdominal pain, dizziness, headache, or nausea/vomiting      T(C): 36.4 (05-31-21 @ 17:29), Max: 36.4 (05-31-21 @ 10:21)  HR: 58 (05-31-21 @ 17:29) (58 - 63)  BP: 129/63 (05-31-21 @ 17:29) (117/65 - 129/63)  RR: 18 (05-31-21 @ 17:29) (18 - 18)  SpO2: 93% (05-31-21 @ 17:29) (93% - 95%)  Wt(kg): --    Physical Exam:  Gen: well appearing luis miguel speaking female who is in NAD  HEENT: NCAT,   Cardio: regular rate and rhythm, +s1s2, no murmurs, Chest pain reproducible on palpation on the left anterior chest 3-4 inches above left breast in mid clavicular line. Deep inhalation also increases pain.  Pulm: CTA b/l, no wheezes,  Abdomen: soft, nontender, nondistended, +BS, no guarding  Extremities: no clubbing, cyanosis or edema,   Neuro: AAOx3, CNII-XII intact grossly, 5/5 strength all extremities,   Skin: warm and dry    Assessment/Plan  89 yo F PMHx T2DM, HTN, HLD, GERD, CAD (s/p LAD 2019 with stents placed on DAPT) admitted for sepsis likely due to intra-abdominal pathology, possible cholangitis given CBD dilation and elevated Tbili and LFTs.       - Patient s/p mechanical fall with distal LEFT humerus T-shaped intra-articular, intercondylar fracture deformity  - ACS less likely in the setting of normal vitals with no SOB and diaphoresis.  - Pain seems to be musculoskeletal in nature as it is reproducible on examination.  - Will get STAT EKG and Cardiac enzymes given sudden onset of left sided chest discomfort and radiation to left shoulder.  - Will continue to monitor  - RN to call with changes.    Addendum:  - EKG: NSR with HR of 62, no ischemic changes noted.   - Cardiac enzymes:

## 2021-06-01 NOTE — PROGRESS NOTE ADULT - ATTENDING COMMENTS
Chart reviewed    Patient seen and examined    Agree with plan as outlined above    88 yo F PMHx T2DM, HTN, HLD, GERD, CAD (s/p LAD/D1 stents, placed on DAPT) admitted for sepsis likely due to intra-abdominal pathology, possible cholangitis given CBD dilation and elevated Tbili and LFTs. Cardiology consulted for medical clearance for possible ERCP.    #CAD s/p 2 stents placed in 2015 of the LAD/D1, on DAPT   - Patient is not complaining of any cardiac symptoms at this time.  - EKG: sinus bradycardia HR: 53, no signs of acute ischemia, no changes compared to previous EKG (EKG in 2019 was sinus bradycardia HR: 54)  - No clear evidence of acute ischemia, trops negative x 1.   - No meaningful evidence of volume overload.  - No previous echo on record.   - Recent cath in 2019-Nonobstructive CAD, patent stents, EF: 65%   - Discontinue Plavix, continue Aspirin as patient no longer needs dual antiplatelet therapy.      #HTN  - BP: 172/70 (26 May 2021 05:20) (160/72 - 189/79).  May be related to pain and not getting her home medications.    - Hydralazine 10mg IVP q6H prn for SBP >140.  Would give a dose now as pt is NPO for ERCP.   - BP now elevated given that BP meds were held due to hypotension on admission and pt currently being on fluids    - would restart BP medications as needed, and start with atenolol so as to avoid withdrawal tachycardia   - Monitor routine hemodynamics
Seen and examined - agree with above
I saw and examined the patient personally. Spoke with above provider regarding this case. I reviewed the above findings completely.  I agree with the above history, physical, and plan which I have edited where appropriate.    No signs of significant ischemia or volume overload. Monitor and replete electrolytes. Keep K>4.0 and Mg>2.0. cont current meds.  fu GI
I saw and examined the patient personally. Spoke with above provider regarding this case. I reviewed the above findings completely.  I agree with the above history, physical, and plan which I have edited where appropriate.    No signs of significant ischemia or volume overload. resume bb today
Seen/examined. agree with above.  restart home oral bp medications
Case discussed with PA this morning at which time HIDA pending.  Seen by me this afternoon.  Family member at bedside.  Labs and imaging personally reviewed.  No leukocytosis.  ALK elevated but only slightly.    GB distended with non obstructive stones.  CBD dilated.  MRCP without choledocholithiasis.  HIDA scan negative for acute cholecystitis.    Frail 89 year old woman admitted s/p fall with left humerus fracture.  Incidentally found to have gallstones.  Denies RUQ pain or colic.    Family gives history of recent hypotensive episodes associated with abdominal pain.    Given lack of leukocytosis, normal TB and LFT's (except ALK, which could otherwise be elevated by fracture), and Normal HIDA there is no indication for acute surgical intervention.  Patient may resume low fat diet as tolerated.  Management of Left humerus fracture as per Ortho.  Management of any other comorbidities as per medicine.    Reconsult as needed should condition change.

## 2021-06-01 NOTE — DISCHARGE NOTE PROVIDER - HOSPITAL COURSE
FROM ADMISSION H+P:   HPI:  88 yo F PMHx T2DM, HTN, HLD, GERD, CAD (s/p LAD 2019 with stents placed on DAPT) brought in by Daughter for fever, weakness. Patient states that she fell a week ago and since then she has been having pain in her left arm and left chest. Patient also complaining of constipation, feeling as if she cannot have a bowel movement.     History taken by the daughter who lives with her and is her primary caretaker. Patient had a mechanical fall May 12th in the grocery store.As per the daughter, patient was in the grocery store. Upon leaving the store, the patient seemed to trip and fall on her left side, did not strike her head. Daughter said she was in some pain at the time, and gave her Bengay cream for the left arm pain, no swelling noted at the time. The following day, she found that her mother had severe swelling from her shoulder to her hand, and bruising. Patient was also complaining of severe pain. Daughter brought her to an orthopedist, who she follows for chronic shoulder pain. X rays were done and daughter states that she was sent home. Patient was complaining of severe pain, and inability to  her arm and complaining of left sided rib pain, worse with inhalation. Daughter brought her to [facility] in [place] who prescribed her oxycodone and a five day course of prednisone for the pain. She completed the five day course and was taking oxycodone tablets once a day at bedtime everyday within adequate control of the pain. Yesterday, patient complaining of decreased appetite, appeared more lethargic than usual. Patient was tolerating small bites of crackers, toast. Last night, daughter took her blood pressure and it was 140/70. This morning, daughter checked her temperature which was 101. She gave her tyelenol X 1, which improved the fever. Patient had an appointment to the hand specialist referred to be orthopedist and upon arriving to the office, patient vomited and was not feeling well. Daughter then brought her to the ER instead.     ---  HOSPITAL COURSE:     ---  CONSULTANTS:     ---  TIME SPENT:  I, the attending physician, was physically present for the key portions of the evaluation and management (E/M) service provided. The total amount of time spent reviewing the hospital notes, laboratory values, imaging findings, assessing/counseling the patient, discussing with consultant physicians, social work, nursing staff was -- minutes    ---  Primary care provider was made aware of plan for discharge:      [  ] NO     [  ] YES    ---  T(C): 36.5 (06-01-21 @ 10:52), Max: 36.6 (06-01-21 @ 05:13)  HR: 55 (06-01-21 @ 10:52) (55 - 63)  BP: 94/49 (06-01-21 @ 10:52) (94/49 - 136/68)  RR: 18 (06-01-21 @ 10:52) (18 - 18)  SpO2: 93% (06-01-21 @ 10:52) (93% - 94%)    PHYSICAL EXAM:  GENERAL: patient appears comfortable, sitting in chair at bedside  ENMT: oropharynx clear without erythema, moist mucous membranes  LUNGS: good air entry bilaterally, clear to auscultation, symmetric breath sounds, no wheezing or rhonchi appreciated  HEART: soft S1/S2, regular rate and rhythm, no murmurs noted, no noted edema to b/l LE  GASTROINTESTINAL: abdomen is soft, nontender, nondistended, normoactive bowel sounds, no palpable masses  MUSCULOSKELETAL: L arm in sling  VASC: DP 2+ b/l  NEUROLOGIC: awake, alert, readily interactive, good muscle tone in 4 extremities FROM ADMISSION H+P:   HPI:  88 yo F PMHx T2DM, HTN, HLD, GERD, CAD (s/p LAD 2019 with stents placed on DAPT) brought in by Daughter for fever, weakness. Patient states that she fell a week ago and since then she has been having pain in her left arm and left chest. Patient also complaining of constipation, feeling as if she cannot have a bowel movement.     History taken by the daughter who lives with her and is her primary caretaker. Patient had a mechanical fall May 12th in the grocery store.As per the daughter, patient was in the grocery store. Upon leaving the store, the patient seemed to trip and fall on her left side, did not strike her head. Daughter said she was in some pain at the time, and gave her Bengay cream for the left arm pain, no swelling noted at the time. The following day, she found that her mother had severe swelling from her shoulder to her hand, and bruising. Patient was also complaining of severe pain. Daughter brought her to an orthopedist, who she follows for chronic shoulder pain. X rays were done and daughter states that she was sent home. Patient was complaining of severe pain, and inability to  her arm and complaining of left sided rib pain, worse with inhalation. Daughter brought her to [facility] in [place] who prescribed her oxycodone and a five day course of prednisone for the pain. She completed the five day course and was taking oxycodone tablets once a day at bedtime everyday within adequate control of the pain. Yesterday, patient complaining of decreased appetite, appeared more lethargic than usual. Patient was tolerating small bites of crackers, toast. Last night, daughter took her blood pressure and it was 140/70. This morning, daughter checked her temperature which was 101. She gave her tyelenol X 1, which improved the fever. Patient had an appointment to the hand specialist referred to be orthopedist and upon arriving to the office, patient vomited and was not feeling well. Daughter then brought her to the ER instead.     ---  HOSPITAL COURSE:   Pt admitted w/ abd pain and reduced po intake. She was found to have elevated LFT's and concern for choledocholithiasis and possibly acute cholecystitis. She likely passed a stone. Her blood cultures were positive likely from translocation from gut bacteria. She was started on meropenem and completed a 7d course. Her symptoms improved. LFT's improved. She developed intermittent CP but this was deemed likely musculoskeletal in nature and related to immobility while her LUE was in a sling. She was seen by orthopedics and recommended to continue w/ sling for comfort and to follow up with outside orthopedics for this issue that was already being followed as outpatient. No other acute events occurred during the hospitaliation. The patient is medically stable for dc home. [facility]      ---  CONSULTANTS:   Ortho (Trip)  Cardio (Savella)  GI ()  Surgery (Nikci)  ID (Sabino)    ---  TIME SPENT:  I, the attending physician, was physically present for the key portions of the evaluation and management (E/M) service provided. The total amount of time spent reviewing the hospital notes, laboratory values, imaging findings, assessing/counseling the patient, discussing with consultant physicians, social work, nursing staff was 49 minutes     ---  T(C): 36.5 (06-01-21 @ 10:52), Max: 36.6 (06-01-21 @ 05:13)  HR: 55 (06-01-21 @ 10:52) (55 - 63)  BP: 94/49 (06-01-21 @ 10:52) (94/49 - 136/68)  RR: 18 (06-01-21 @ 10:52) (18 - 18)  SpO2: 93% (06-01-21 @ 10:52) (93% - 94%)    PHYSICAL EXAM:  GENERAL: patient appears comfortable, sitting in chair at bedside  ENMT: oropharynx clear without erythema, moist mucous membranes  LUNGS: good air entry bilaterally, clear to auscultation, symmetric breath sounds, no wheezing or rhonchi appreciated  HEART: soft S1/S2, regular rate and rhythm, no murmurs noted, no noted edema to b/l LE  GASTROINTESTINAL: abdomen is soft, nontender, nondistended, normoactive bowel sounds, no palpable masses  MUSCULOSKELETAL: L arm in sling  VASC: DP 2+ b/l  NEUROLOGIC: awake, alert, readily interactive, good muscle tone in 4 extremities FROM ADMISSION H+P:   HPI:  89 yo F PMHx T2DM, HTN, HLD, GERD, CAD (s/p LAD 2019 with stents placed on DAPT) brought in by Daughter for fever, weakness. Patient states that she fell a week ago and since then she has been having pain in her left arm and left chest. Patient also complaining of constipation, feeling as if she cannot have a bowel movement.     History taken by the daughter who lives with her and is her primary caretaker. Patient had a mechanical fall May 12th in the grocery store.As per the daughter, patient was in the grocery store. Upon leaving the store, the patient seemed to trip and fall on her left side, did not strike her head. Daughter said she was in some pain at the time, and gave her Bengay cream for the left arm pain, no swelling noted at the time. The following day, she found that her mother had severe swelling from her shoulder to her hand, and bruising. Patient was also complaining of severe pain. Daughter brought her to an orthopedist, who she follows for chronic shoulder pain. X rays were done and daughter states that she was sent home. Patient was complaining of severe pain, and inability to  her arm and complaining of left sided rib pain, worse with inhalation. Daughter brought her to [facility] in [place] who prescribed her oxycodone and a five day course of prednisone for the pain. She completed the five day course and was taking oxycodone tablets once a day at bedtime everyday within adequate control of the pain. Yesterday, patient complaining of decreased appetite, appeared more lethargic than usual. Patient was tolerating small bites of crackers, toast. Last night, daughter took her blood pressure and it was 140/70. This morning, daughter checked her temperature which was 101. She gave her tyelenol x1, which improved the fever. Patient had an appointment to the hand specialist referred to be orthopedist and upon arriving to the office, patient vomited and was not feeling well. Daughter then brought her to the ER instead.     ---  HOSPITAL COURSE:   Pt admitted w/ abd pain and reduced po intake. She was found to have elevated LFT's and concern for choledocholithiasis and possibly acute cholecystitis. She likely passed a stone. Her blood cultures were positive likely from translocation from gut bacteria. She was started on meropenem and completed a 7d course. Her symptoms improved. LFT's improved. She developed intermittent CP but this was deemed likely musculoskeletal in nature and related to immobility while her LUE was in a sling. She was seen by orthopedics and recommended to continue w/ sling for comfort and to follow up with outside orthopedics for this issue that was already being followed as outpatient. No other acute events occurred during the hospitaliation. The patient is medically stable for dc home. [facility]      ---  CONSULTANTS:   Ortho (Trip)  Cardio (Savella)  GI ()  Surgery (Nicki)  ID (Sabino)    ---  TIME SPENT:  I, the attending physician, was physically present for the key portions of the evaluation and management (E/M) service provided. The total amount of time spent reviewing the hospital notes, laboratory values, imaging findings, assessing/counseling the patient, discussing with consultant physicians, social work, nursing staff was 49 minutes     ---  T(C): 36.5 (06-01-21 @ 10:52), Max: 36.6 (06-01-21 @ 05:13)  HR: 55 (06-01-21 @ 10:52) (55 - 63)  BP: 94/49 (06-01-21 @ 10:52) (94/49 - 136/68)  RR: 18 (06-01-21 @ 10:52) (18 - 18)  SpO2: 93% (06-01-21 @ 10:52) (93% - 94%)    PHYSICAL EXAM:  GENERAL: patient appears comfortable, sitting in chair at bedside  ENMT: oropharynx clear without erythema, moist mucous membranes  LUNGS: good air entry bilaterally, clear to auscultation, symmetric breath sounds, no wheezing or rhonchi appreciated  HEART: soft S1/S2, regular rate and rhythm, no murmurs noted, no noted edema to b/l LE  GASTROINTESTINAL: abdomen is soft, nontender, nondistended, normoactive bowel sounds, no palpable masses  MUSCULOSKELETAL: L arm in sling  VASC: DP 2+ b/l  NEUROLOGIC: awake, alert, readily interactive, good muscle tone in 4 extremities

## 2021-06-01 NOTE — PROGRESS NOTE ADULT - SUBJECTIVE AND OBJECTIVE BOX
INTERVAL HPI/OVERNIGHT EVENTS:  pt seen and examined   No acute events overnight    MEDICATIONS  (STANDING):  aspirin enteric coated 81 milliGRAM(s) Oral daily  dextrose 40% Gel 15 Gram(s) Oral once  dextrose 5%. 1000 milliLiter(s) (50 mL/Hr) IV Continuous <Continuous>  dextrose 5%. 1000 milliLiter(s) (100 mL/Hr) IV Continuous <Continuous>  dextrose 50% Injectable 25 Gram(s) IV Push once  dextrose 50% Injectable 12.5 Gram(s) IV Push once  dextrose 50% Injectable 25 Gram(s) IV Push once  glucagon  Injectable 1 milliGRAM(s) IntraMuscular once  heparin   Injectable 5000 Unit(s) SubCutaneous every 12 hours  insulin lispro (ADMELOG) corrective regimen sliding scale   SubCutaneous every 6 hours  melatonin 5 milliGRAM(s) Oral at bedtime  meropenem  IVPB 500 milliGRAM(s) IV Intermittent every 12 hours  pantoprazole    Tablet 40 milliGRAM(s) Oral before breakfast  senna 2 Tablet(s) Oral at bedtime  sucralfate 1 Gram(s) Oral every 12 hours    MEDICATIONS  (PRN):  acetaminophen   Tablet .. 650 milliGRAM(s) Oral every 6 hours PRN Mild Pain (1 - 3)  hydrALAZINE Injectable 10 milliGRAM(s) IV Push every 6 hours PRN For sbp>140  morphine  - Injectable 2 milliGRAM(s) IV Push every 6 hours PRN Severe Pain (7 - 10)  ondansetron Injectable 4 milliGRAM(s) IV Push every 8 hours PRN Nausea and/or Vomiting  polyethylene glycol 3350 17 Gram(s) Oral daily PRN Constipation      Allergies  No Known Allergies    Intolerances    ROS:  General:  No wt loss, + fevers, chills, night sweats, fatigue,   Eyes:  Good vision, no reported pain  ENT:  No sore throat, pain, runny nose, dysphagia  CV:  No pain, palpitations, hypo/hypertension  Resp:  No dyspnea, cough, tachypnea, wheezing  GI:  No pain, No nausea, No vomiting, No diarrhea, No constipation, No weight loss, No fever, No pruritis, No rectal bleeding, No tarry stools, No dysphagia,  :  No pain, bleeding, incontinence, nocturia  Muscle:  No pain, weakness  Neuro:  No weakness, tingling, memory problems  Psych:  No fatigue, insomnia, mood problems, depression  Endocrine:  No polyuria, polydipsia, cold/heat intolerance  Heme:  No petechiae, ecchymosis, easy bruisability  Skin:  No rash, tattoos, scars, edema      Vital Signs Last 24 Hrs  T(C): 36.6 (26 May 2021 08:10), Max: 36.8 (25 May 2021 13:40)  T(F): 97.8 (26 May 2021 08:10), Max: 98.3 (25 May 2021 13:40)  HR: 66 (26 May 2021 08:10) (59 - 66)  BP: 166/72 (26 May 2021 08:10) (160/72 - 189/79)  BP(mean): --  RR: 18 (26 May 2021 08:10) (17 - 18)  SpO2: 97% (26 May 2021 08:10) (94% - 97%)    GENERAL:  Appears stated age,   HEENT:  NC/AT,    CHEST:  Full & symmetric excursion,   HEART:  Regular rhythm  ABDOMEN:  Soft, non-tender, non-distended,   EXTEREMITIES:  no cyanosis,clubbing or edema  SKIN:  No rash  NEURO:  Alert,          LABS:                        10.6   5.58  )-----------( 308      ( 26 May 2021 06:55 )             32.4     05-    139  |  105  |  13  ----------------------------<  99  3.7   |  26  |  0.69    Ca    8.4<L>      26 May 2021 06:55    TPro  6.3  /  Alb  2.7<L>  /  TBili  0.8  /  DBili  x   /  AST  39<H>  /  ALT  44  /  AlkPhos  159<H>  05-    PT/INR - ( 24 May 2021 15:29 )   PT: 13.6 sec;   INR: 1.17 ratio         PTT - ( 24 May 2021 15:29 )  PTT:26.6 sec  Urinalysis Basic - ( 25 May 2021 20:55 )    Color: Yellow / Appearance: Clear / S.010 / pH: x  Gluc: x / Ketone: Negative  / Bili: Negative / Urobili: Negative   Blood: x / Protein: Negative / Nitrite: Negative   Leuk Esterase: Negative / RBC: x / WBC x   Sq Epi: x / Non Sq Epi: x / Bacteria: x      Culture - Blood (collected 24 May 2021 22:07)  Source: .Blood Blood-Peripheral  Gram Stain (25 May 2021 10:05):    Growth in aerobic bottle: Gram Negative Rods  Preliminary Report (25 May 2021 11:07):    Growth in aerobic bottle: Gram Negative Rods    MDRO detected in BCID PCR, resistance marker = CTX-M (ESBL)    ***Blood Panel PCR results on this specimen are available    approximately 3 hours after the Gram stain result.***    Gram stain, PCR, and/or culture results may not always    correspond due to difference in methodologies.    ************************************************************    This PCR assay was performed by multiplex PCR. This    Assay tests for 66 bacterial and resistance gene targets.    Please refer to the [facility] mycirQle test directory    at [email or web address] for details.  Organism: Blood Culture PCR (25 May 2021 11:07)  Organism: Blood Culture PCR (25 May 2021 11:07)    Culture - Blood (collected 24 May 2021 22:07)  Source: .Blood Blood-Peripheral  Preliminary Report (25 May 2021 23:01):    No growth to date.        RADIOLOGY & ADDITIONAL TESTS:  EXAM:  MR MRCP                        PROCEDURE DATE:  2021    INTERPRETATION:  CLINICAL INFORMATION: Transaminitis and sepsis. Dilated common bile duct.  COMPARISON: No prior abdominal MR is available for comparison. Reference is made with a previous abdominal/pelvic CT dated 2021  CONTRAST/COMPLICATIONS:  IV Contrast: NONE  Oral Contrast: NONE  Complications: None reported at time of study completion    PROCEDURE:  MRI/MRCP was performed. Radial and 3D MRCP sequences were obtained.    FINDINGS:  LOWER CHEST: Trace left pleural effusion.  LIVER: Within normal limits.  BILE DUCTS: The common bile duct is dilated measuring 1.4 cm in diameter with gradual tapering distally. No evidence for choledocholithiasis.  GALLBLADDER: Gallstones in the gallbladder. No evidence for thickened gallbladder wall. Small pericholecystic fluid. If there is a clinical suspicion for acute cholecystitis, HIDA scan may be pursued for further evaluation.  SPLEEN: Within normal limits.  PANCREAS: No pancreatic mass is identified. Pancreas divisum.  ADRENALS: Within normal limits.  KIDNEYS/URETERS: Small brightly T2 hyperintense lesions in the kidneys bilaterally, representing probable cysts.    VISUALIZED PORTIONS:  BOWEL: Within normal limits.  PERITONEUM: Small ascites.  VESSELS: Within normal limits.  RETROPERITONEUM/LYMPH NODES: No lymphadenopathy.  ABDOMINAL WALL: Within normal limits.  BONES: Degenerative spondylosis.    IMPRESSION:  Dilated common bile duct measuring 1.4 cm in diameter with gradual tapering distally. No evidence for choledocholithiasis.  Pancreas divisum.  Gallstones in the gallbladder. No evidence for thickened gallbladder wall. Small pericholecystic fluid. If there is a clinical suspicion for acute cholecystitis, HIDA scan may be pursued for further evaluation.  Small ascites.  Trace left pleural effusion.

## 2021-06-01 NOTE — PROGRESS NOTE ADULT - ASSESSMENT
89F PMH T2DM, HTN, HLD, GERD, CAD (s/p LAD/D1 stents, placed on DAPT) admitted for sepsis likely due to intra-abdominal pathology, possible cholangitis given CBD dilation and elevated Tbili and LFTs. No plans for ERCP as per GI    CAD s/p 2 stents placed in 2015 of the LAD/D1, on DAPT   - Patient is not complaining of any cardiac symptoms at this time.  - EKG: sinus bradycardia HR: 53, no signs of acute ischemia, no changes compared to previous EKG (EKG in 2019 was sinus bradycardia HR: 54)  - No clear evidence of acute ischemia, trops negative x 1.   - No meaningful evidence of volume overload.  - No previous echo on record.   - Recent cath in 2019-Nonobstructive CAD, patent stents, EF: 65%    - Continue Aspirin    HTN  - BP: 136/68 (06-01-21 @ 05:13) (129/63 - 136/68)  - Continue Atenolol 25 mg QD, Imdur 30 mg QD and losartan   - Monitor routine hemodynamics    HLD  - Resume home statin. LFTs improved    Dilated CBD  - MRCP with dilated CBD   - elevated LFTs now downtrending  - continue ABX as per ID for Bld CX + GNR  - Follow GI recommendations.     S/p Fall with Fractured L humerus  - Ortho following  - Pain control  - I/S     - Monitor and replete lytes, keep K>4, Mg>2.  - All other medical needs as per primary team.  - Other cardiovascular workup will depend on clinical course.  - Will continue to follow.    Qiana Javed, MS FNP, [facility]  Nurse Practitioner- Cardiology   Spectra #[phone]

## 2021-06-01 NOTE — DISCHARGE NOTE PROVIDER - NSDCMRMEDTOKEN_GEN_ALL_CORE_FT
aspirin 81 mg oral tablet: 1 tab(s) orally once a day  atenolol 25 mg oral tablet: 1 tab(s) orally once a day  clopidogrel 75 mg oral tablet: 1 tab(s) orally once a day  famotidine 40 mg oral tablet: orally once a day  isosorbide mononitrate 30 mg oral tablet, extended release: 1 tab(s) orally once a day (in the morning)  losartan 50 mg oral tablet: 1 tab(s) orally once a day  metFORMIN 500 mg oral tablet: 1 tab(s) orally 2 times a day, hold for 2 days then resume on 7/12/19.  multivitamin: orally once a day  omeprazole 20 mg oral delayed release capsule: 1 cap(s) orally once a day  oxycodone-acetaminophen 5 mg-325 mg oral tablet: 1 tab(s) orally every 6 hours  predniSONE 20 mg oral tablet: 1 tab(s) orally once a day  simvastatin 40 mg oral tablet: 1 tab(s) orally once a day (at bedtime)  sucralfate 1 g oral tablet: 1 tab(s) orally 4 times a day (before meals and at bedtime)   aspirin 81 mg oral tablet: 1 tab(s) orally once a day  atenolol 25 mg oral tablet: 1 tab(s) orally once a day  famotidine 40 mg oral tablet: orally once a day  isosorbide mononitrate 30 mg oral tablet, extended release: 1 tab(s) orally once a day (in the morning)  losartan 50 mg oral tablet: 1 tab(s) orally once a day  metFORMIN 500 mg oral tablet: 1 tab(s) orally 2 times a day, hold for 2 days then resume on 7/12/19.  multivitamin: orally once a day  omeprazole 20 mg oral delayed release capsule: 1 cap(s) orally once a day  oxycodone-acetaminophen 5 mg-325 mg oral tablet: 1 tab(s) orally every 6 hours  predniSONE 20 mg oral tablet: 1 tab(s) orally once a day  simvastatin 40 mg oral tablet: 1 tab(s) orally once a day (at bedtime)  sucralfate 1 g oral tablet: 1 tab(s) orally 4 times a day (before meals and at bedtime)   aspirin 81 mg oral tablet: 1 tab(s) orally once a day  atenolol 25 mg oral tablet: 1 tab(s) orally once a day  famotidine 40 mg oral tablet: orally once a day  isosorbide mononitrate 30 mg oral tablet, extended release: 1 tab(s) orally once a day (in the morning)  losartan 50 mg oral tablet: 1 tab(s) orally once a day  metFORMIN 500 mg oral tablet: 1 tab(s) orally 2 times a day, hold for 2 days then resume on 7/12/19.  multivitamin: orally once a day  omeprazole 20 mg oral delayed release capsule: 1 cap(s) orally once a day  oxycodone-acetaminophen 5 mg-325 mg oral tablet: 1 tab(s) orally every 6 hours  simvastatin 40 mg oral tablet: 1 tab(s) orally once a day (at bedtime)  sucralfate 1 g oral tablet: 1 tab(s) orally 4 times a day (before meals and at bedtime)   aspirin 81 mg oral tablet: 1 tab(s) orally once a day  atenolol 25 mg oral tablet: 1 tab(s) orally once a day  famotidine 40 mg oral tablet: orally once a day  isosorbide mononitrate 30 mg oral tablet, extended release: 1 tab(s) orally once a day (in the morning)  losartan 50 mg oral tablet: 1 tab(s) orally once a day  metFORMIN 500 mg oral tablet: 1 tab(s) orally 2 times a day  multivitamin: orally once a day  omeprazole 20 mg oral delayed release capsule: 1 cap(s) orally once a day  oxycodone-acetaminophen 5 mg-325 mg oral tablet: 1 tab(s) orally every 6 hours  simvastatin 40 mg oral tablet: 1 tab(s) orally once a day (at bedtime)  sucralfate 1 g oral tablet: 1 tab(s) orally 4 times a day (before meals and at bedtime)

## 2021-06-01 NOTE — DISCHARGE NOTE PROVIDER - NSDCCPCAREPLAN_GEN_ALL_CORE_FT
PRINCIPAL DISCHARGE DIAGNOSIS  Diagnosis: Transaminitis  Assessment and Plan of Treatment: Improved after bassing the stone. No further treatment is warranted at this time. Supportive care. Recommend following up with your primary care provider and routinely seeing gastroenterologist for further managment and monitoring of your liver function testing.      SECONDARY DISCHARGE DIAGNOSES  Diagnosis: Cholelithiasis with obstruction  Assessment and Plan of Treatment: Resolved    Diagnosis: Elbow fracture, left  Assessment and Plan of Treatment: Recommend following up with your orthopedist in 1 week. Recommend continuing sling until seeing orthopedics.    Diagnosis: Hypertension  Assessment and Plan of Treatment: Continue home meds    Diagnosis: Hyperlipidemia  Assessment and Plan of Treatment: Continue home meds    Diagnosis: CAD (coronary artery disease)  Assessment and Plan of Treatment: Can discontinue plavix. Continue taking aspirin    Diagnosis: Type 2 diabetes mellitus  Assessment and Plan of Treatment: Continue home meds    Diagnosis: Rib fracture  Assessment and Plan of Treatment: Continue supportive care. Tylenol is okay to take as per instructions on the bottle for mild pain.    Diagnosis: GERD (gastroesophageal reflux disease)  Assessment and Plan of Treatment: Continue home meds

## 2021-06-01 NOTE — CHART NOTE - NSCHARTNOTEFT_GEN_A_CORE
Assessment: patient seen for follow up admitted with sepsis cholelithiasis . spoke to patient using  services # [id number] patient reports with good PO intake. no N/V reported . 5/30 +BM food from home noted at bedside . no difficulties chewing swallowing on soft diet per patient   Factors impacting intake: [ ] none [ ] nausea  [ ] vomiting [ ] diarrhea [ ] constipation  [x ]chewing problems [ ] swallowing issues  [ ] other: poor dentition noted cut up soft diet ordered     Diet Presciption: Diet, Soft:   Consistent Carbohydrate {No Snacks}  Fiber/Residue Restricted  DASH/TLC {Sodium & Cholesterol Restricted} (05-27-21 @ 10:36)    Intake: %     Current Weight: 6/1 139.7# 5/31 137.7# 142# admit wt  no edema  % Weight Change    Pertinent Medications: MEDICATIONS  (STANDING):  aspirin enteric coated 81 milliGRAM(s) Oral daily  ATENolol  Tablet 25 milliGRAM(s) Oral daily  heparin   Injectable 5000 Unit(s) SubCutaneous every 12 hours  isosorbide   mononitrate ER Tablet (IMDUR) 30 milliGRAM(s) Oral daily  losartan 50 milliGRAM(s) Oral daily  melatonin 5 milliGRAM(s) Oral at bedtime  meropenem  IVPB 1000 milliGRAM(s) IV Intermittent every 8 hours  pantoprazole    Tablet 40 milliGRAM(s) Oral before breakfast  senna 2 Tablet(s) Oral at bedtime  sucralfate 1 Gram(s) Oral every 12 hours    MEDICATIONS  (PRN):  acetaminophen   Tablet .. 650 milliGRAM(s) Oral every 6 hours PRN Mild Pain (1 - 3)  ondansetron Injectable 4 milliGRAM(s) IV Push every 8 hours PRN Nausea and/or Vomiting  polyethylene glycol 3350 17 Gram(s) Oral daily PRN Constipation    Pertinent Labs: 06-01 Na143 mmol/L Glu 98 mg/dL K+ 3.8 mmol/L Cr  0.66 mg/dL BUN 24 mg/dL<H> 06-01 Alb 2.9 g/dL<        Skin: no pressure injury    Estimated Needs:   [x ] no change since previous assessment  [ ] recalculated:     Previous Nutrition Diagnosis:   [ ] Inadequate Energy Intake [ ]Inadequate Oral Intake [ ] Excessive Energy Intake   [ ] Underweight [ ] Increased Nutrient Needs [ ] Overweight/Obesity   [x ] Altered GI Function [ ] Unintended Weight Loss [ ] Food & Nutrition Related Knowledge Deficit [ ] Malnutrition     Nutrition Diagnosis is [x ] ongoing tolerating diet at this time (resolving)   [ ] resolved [ ] not applicable     New Nutrition Diagnosis: [x ] not applicable       Interventions:   Recommend  [ ] Change Diet To:  [ ] Nutrition Supplement  [ ] Nutrition Support  [ x] Other: continue diet as ordered, will follow labs, weights    Monitoring and Evaluation:   [x ] PO intake [ x ] Tolerance to diet prescription [ x ] weights [ x ] labs[ x ] follow up per protocol  [ ] other:

## 2021-08-30 ENCOUNTER — TRANSCRIPTION ENCOUNTER (OUTPATIENT)
Age: 86
End: 2021-08-30

## 2021-08-30 ENCOUNTER — INPATIENT (INPATIENT)
Facility: HOSPITAL | Age: 86
LOS: 8 days | Discharge: HOME | DRG: 481 | End: 2021-09-08
Attending: SURGERY | Admitting: SURGERY
Payer: MEDICARE

## 2021-08-30 VITALS
HEART RATE: 63 BPM | OXYGEN SATURATION: 98 % | DIASTOLIC BLOOD PRESSURE: 67 MMHG | SYSTOLIC BLOOD PRESSURE: 184 MMHG | TEMPERATURE: 98 F | RESPIRATION RATE: 20 BRPM

## 2021-08-30 DIAGNOSIS — H26.9 UNSPECIFIED CATARACT: Chronic | ICD-10-CM

## 2021-08-30 DIAGNOSIS — S72.001A FRACTURE OF UNSPECIFIED PART OF NECK OF RIGHT FEMUR, INITIAL ENCOUNTER FOR CLOSED FRACTURE: ICD-10-CM

## 2021-08-30 LAB
ALBUMIN SERPL ELPH-MCNC: 3.8 G/DL — SIGNIFICANT CHANGE UP (ref 3.3–5.2)
ALP SERPL-CCNC: 103 U/L — SIGNIFICANT CHANGE UP (ref 40–120)
ALT FLD-CCNC: 14 U/L — SIGNIFICANT CHANGE UP
ANION GAP SERPL CALC-SCNC: 13 MMOL/L — SIGNIFICANT CHANGE UP (ref 5–17)
APTT BLD: 24.7 SEC — LOW (ref 27.5–35.5)
AST SERPL-CCNC: 32 U/L — HIGH
BASOPHILS # BLD AUTO: 0.04 K/UL — SIGNIFICANT CHANGE UP (ref 0–0.2)
BASOPHILS NFR BLD AUTO: 0.4 % — SIGNIFICANT CHANGE UP (ref 0–2)
BILIRUB SERPL-MCNC: 0.2 MG/DL — LOW (ref 0.4–2)
BLD GP AB SCN SERPL QL: SIGNIFICANT CHANGE UP
BUN SERPL-MCNC: 23.9 MG/DL — HIGH (ref 8–20)
CALCIUM SERPL-MCNC: 9.1 MG/DL — SIGNIFICANT CHANGE UP (ref 8.6–10.2)
CHLORIDE SERPL-SCNC: 104 MMOL/L — SIGNIFICANT CHANGE UP (ref 98–107)
CO2 SERPL-SCNC: 22 MMOL/L — SIGNIFICANT CHANGE UP (ref 22–29)
CREAT SERPL-MCNC: 1.18 MG/DL — SIGNIFICANT CHANGE UP (ref 0.5–1.3)
EOSINOPHIL # BLD AUTO: 0.2 K/UL — SIGNIFICANT CHANGE UP (ref 0–0.5)
EOSINOPHIL NFR BLD AUTO: 1.8 % — SIGNIFICANT CHANGE UP (ref 0–6)
GLUCOSE BLDC GLUCOMTR-MCNC: 138 MG/DL — HIGH (ref 70–99)
GLUCOSE SERPL-MCNC: 136 MG/DL — HIGH (ref 70–99)
HCT VFR BLD CALC: 35.7 % — SIGNIFICANT CHANGE UP (ref 34.5–45)
HGB BLD-MCNC: 11.2 G/DL — LOW (ref 11.5–15.5)
IMM GRANULOCYTES NFR BLD AUTO: 0.7 % — SIGNIFICANT CHANGE UP (ref 0–1.5)
INR BLD: 1.05 RATIO — SIGNIFICANT CHANGE UP (ref 0.88–1.16)
LYMPHOCYTES # BLD AUTO: 1.45 K/UL — SIGNIFICANT CHANGE UP (ref 1–3.3)
LYMPHOCYTES # BLD AUTO: 13 % — SIGNIFICANT CHANGE UP (ref 13–44)
MCHC RBC-ENTMCNC: 29.9 PG — SIGNIFICANT CHANGE UP (ref 27–34)
MCHC RBC-ENTMCNC: 31.4 GM/DL — LOW (ref 32–36)
MCV RBC AUTO: 95.2 FL — SIGNIFICANT CHANGE UP (ref 80–100)
MONOCYTES # BLD AUTO: 0.88 K/UL — SIGNIFICANT CHANGE UP (ref 0–0.9)
MONOCYTES NFR BLD AUTO: 7.9 % — SIGNIFICANT CHANGE UP (ref 2–14)
NEUTROPHILS # BLD AUTO: 8.53 K/UL — HIGH (ref 1.8–7.4)
NEUTROPHILS NFR BLD AUTO: 76.2 % — SIGNIFICANT CHANGE UP (ref 43–77)
PLATELET # BLD AUTO: 304 K/UL — SIGNIFICANT CHANGE UP (ref 150–400)
POTASSIUM SERPL-MCNC: 5.1 MMOL/L — SIGNIFICANT CHANGE UP (ref 3.5–5.3)
POTASSIUM SERPL-SCNC: 5.1 MMOL/L — SIGNIFICANT CHANGE UP (ref 3.5–5.3)
PROT SERPL-MCNC: 6.9 G/DL — SIGNIFICANT CHANGE UP (ref 6.6–8.7)
PROTHROM AB SERPL-ACNC: 12.1 SEC — SIGNIFICANT CHANGE UP (ref 10.6–13.6)
RBC # BLD: 3.75 M/UL — LOW (ref 3.8–5.2)
RBC # FLD: 13.2 % — SIGNIFICANT CHANGE UP (ref 10.3–14.5)
SARS-COV-2 RNA SPEC QL NAA+PROBE: SIGNIFICANT CHANGE UP
SODIUM SERPL-SCNC: 139 MMOL/L — SIGNIFICANT CHANGE UP (ref 135–145)
WBC # BLD: 11.18 K/UL — HIGH (ref 3.8–10.5)
WBC # FLD AUTO: 11.18 K/UL — HIGH (ref 3.8–10.5)

## 2021-08-30 PROCEDURE — 73552 X-RAY EXAM OF FEMUR 2/>: CPT | Mod: 26,RT

## 2021-08-30 PROCEDURE — 73502 X-RAY EXAM HIP UNI 2-3 VIEWS: CPT | Mod: 26,RT

## 2021-08-30 PROCEDURE — 71045 X-RAY EXAM CHEST 1 VIEW: CPT | Mod: 26

## 2021-08-30 PROCEDURE — 93010 ELECTROCARDIOGRAM REPORT: CPT

## 2021-08-30 PROCEDURE — 99285 EMERGENCY DEPT VISIT HI MDM: CPT

## 2021-08-30 PROCEDURE — 99221 1ST HOSP IP/OBS SF/LOW 40: CPT | Mod: 57

## 2021-08-30 PROCEDURE — 99221 1ST HOSP IP/OBS SF/LOW 40: CPT

## 2021-08-30 RX ORDER — SODIUM CHLORIDE 9 MG/ML
1000 INJECTION, SOLUTION INTRAVENOUS
Refills: 0 | Status: DISCONTINUED | OUTPATIENT
Start: 2021-08-30 | End: 2021-09-01

## 2021-08-30 RX ORDER — DEXTROSE 50 % IN WATER 50 %
12.5 SYRINGE (ML) INTRAVENOUS ONCE
Refills: 0 | Status: DISCONTINUED | OUTPATIENT
Start: 2021-08-30 | End: 2021-09-08

## 2021-08-30 RX ORDER — SENNA PLUS 8.6 MG/1
1 TABLET ORAL AT BEDTIME
Refills: 0 | Status: DISCONTINUED | OUTPATIENT
Start: 2021-08-30 | End: 2021-09-08

## 2021-08-30 RX ORDER — MORPHINE SULFATE 50 MG/1
2 CAPSULE, EXTENDED RELEASE ORAL ONCE
Refills: 0 | Status: DISCONTINUED | OUTPATIENT
Start: 2021-08-30 | End: 2021-08-30

## 2021-08-30 RX ORDER — SODIUM CHLORIDE 9 MG/ML
1000 INJECTION, SOLUTION INTRAVENOUS
Refills: 0 | Status: DISCONTINUED | OUTPATIENT
Start: 2021-08-30 | End: 2021-09-08

## 2021-08-30 RX ORDER — DEXTROSE 50 % IN WATER 50 %
25 SYRINGE (ML) INTRAVENOUS ONCE
Refills: 0 | Status: DISCONTINUED | OUTPATIENT
Start: 2021-08-30 | End: 2021-09-08

## 2021-08-30 RX ORDER — GLUCAGON INJECTION, SOLUTION 0.5 MG/.1ML
1 INJECTION, SOLUTION SUBCUTANEOUS ONCE
Refills: 0 | Status: DISCONTINUED | OUTPATIENT
Start: 2021-08-30 | End: 2021-09-08

## 2021-08-30 RX ORDER — ACETAMINOPHEN 500 MG
650 TABLET ORAL EVERY 6 HOURS
Refills: 0 | Status: DISCONTINUED | OUTPATIENT
Start: 2021-08-30 | End: 2021-09-08

## 2021-08-30 RX ORDER — LIDOCAINE 4 G/100G
1 CREAM TOPICAL DAILY
Refills: 0 | Status: DISCONTINUED | OUTPATIENT
Start: 2021-08-30 | End: 2021-09-08

## 2021-08-30 RX ORDER — TRAMADOL HYDROCHLORIDE 50 MG/1
25 TABLET ORAL EVERY 6 HOURS
Refills: 0 | Status: DISCONTINUED | OUTPATIENT
Start: 2021-08-30 | End: 2021-09-06

## 2021-08-30 RX ORDER — INSULIN LISPRO 100/ML
VIAL (ML) SUBCUTANEOUS
Refills: 0 | Status: DISCONTINUED | OUTPATIENT
Start: 2021-08-30 | End: 2021-09-08

## 2021-08-30 RX ORDER — FENTANYL CITRATE 50 UG/ML
25 INJECTION INTRAVENOUS ONCE
Refills: 0 | Status: DISCONTINUED | OUTPATIENT
Start: 2021-08-30 | End: 2021-08-30

## 2021-08-30 RX ORDER — DEXTROSE 50 % IN WATER 50 %
15 SYRINGE (ML) INTRAVENOUS ONCE
Refills: 0 | Status: DISCONTINUED | OUTPATIENT
Start: 2021-08-30 | End: 2021-09-08

## 2021-08-30 RX ORDER — ENOXAPARIN SODIUM 100 MG/ML
40 INJECTION SUBCUTANEOUS ONCE
Refills: 0 | Status: DISCONTINUED | OUTPATIENT
Start: 2021-08-30 | End: 2021-08-30

## 2021-08-30 RX ORDER — TRAMADOL HYDROCHLORIDE 50 MG/1
50 TABLET ORAL EVERY 6 HOURS
Refills: 0 | Status: DISCONTINUED | OUTPATIENT
Start: 2021-08-30 | End: 2021-09-04

## 2021-08-30 RX ORDER — ENOXAPARIN SODIUM 100 MG/ML
30 INJECTION SUBCUTANEOUS EVERY 12 HOURS
Refills: 0 | Status: DISCONTINUED | OUTPATIENT
Start: 2021-08-30 | End: 2021-08-31

## 2021-08-30 RX ORDER — ACETAMINOPHEN 500 MG
1000 TABLET ORAL ONCE
Refills: 0 | Status: COMPLETED | OUTPATIENT
Start: 2021-08-30 | End: 2021-08-30

## 2021-08-30 RX ADMIN — MORPHINE SULFATE 2 MILLIGRAM(S): 50 CAPSULE, EXTENDED RELEASE ORAL at 17:49

## 2021-08-30 RX ADMIN — MORPHINE SULFATE 2 MILLIGRAM(S): 50 CAPSULE, EXTENDED RELEASE ORAL at 18:16

## 2021-08-30 RX ADMIN — FENTANYL CITRATE 25 MICROGRAM(S): 50 INJECTION INTRAVENOUS at 21:40

## 2021-08-30 RX ADMIN — Medication 400 MILLIGRAM(S): at 23:06

## 2021-08-30 RX ADMIN — LIDOCAINE 1 PATCH: 4 CREAM TOPICAL at 23:10

## 2021-08-30 NOTE — CONSULT NOTE ADULT - ATTENDING COMMENTS
Orthopaedic Trauma Surgeon Addendum:    I have personally performed a face-to-face diagnostic evaluation on this patient.  I have reviewed the physician assistant note and agree with the history, exam, and plan of care, except as noted.    Orthopedic Surgery is ready to proceed with surgery pending medical optimization and adequate Operating Room availability. Risks of surgical delay discussed with other providers and staff. Please call with any questions or concerns.     Henry Leach MD  Orthopaedic Trauma Surgeon  [facility]

## 2021-08-30 NOTE — ED PROVIDER NOTE - NSICDXPASTMEDICALHX_GEN_ALL_CORE_FT
PAST MEDICAL HISTORY:  No pertinent past medical history PAST MEDICAL HISTORY:  DM (diabetes mellitus) diet-controlled    HTN (hypertension)

## 2021-08-30 NOTE — CONSULT NOTE ADULT - SUBJECTIVE AND OBJECTIVE BOX
Pt Name: KADY SUAREZ    MRN: [id number]      Patient is a 89y Female presenting to the emergency department with a chief complaint of right hip pain. Patient accompanied by daughter at bedside. History received by daughter. States that patient was walking on stairs earlier today and fell. Patient unable to get up after fall - was helped up after a while and was unable to bear weight on right lower extremity. Xray taken in ED shows right IT fx. Patient typically ambulates on own. Patient lives at home with daughter.     PMHx htn, cardiac stents. Patient daughter states that patient was on plavix for cardiac stents but was stopped after a fall in may. Patient daughter states that she has not been taking plavix since May.       REVIEW OF SYSTEMS    General: Alert, responsive, in NAD    Skin/Breast: No rashes, no pruritis     Respiratory and Thorax: No difficulty breathing. No cough.  	   Cardiovascular:	No chest pain. No palpitations.    Gastrointestinal:	 No abdominal pain. No diarrhea.     Musculoskeletal: SEE HPI.    Neurological: No sensory or motor changes.     Hematology/Lymphatics: No swelling.    ROS is otherwise negative.      PAST MEDICAL & SURGICAL HISTORY:  HTN (hypertension)    DM (diabetes mellitus)  diet-controlled        Allergies: No Known Allergies      Medications: acetaminophen  IVPB .. 1000 milliGRAM(s) IV Intermittent Once  fentaNYL    Injectable 25 MICROGram(s) IV Push Once      FAMILY HISTORY:  : non-contributory    Social History:                             11.2   11.18 )-----------( 304      ( 30 Aug 2021 17:45 )             35.7       08-30    139  |  104  |  23.9<H>  ----------------------------<  136<H>  5.1   |  22.0  |  1.18    Ca    9.1      30 Aug 2021 17:45    TPro  6.9  /  Alb  3.8  /  TBili  0.2<L>  /  DBili  x   /  AST  32<H>  /  ALT  14  /  AlkPhos  103  08-30      Vital Signs Last 24 Hrs  T(C): 36.6 (30 Aug 2021 16:38), Max: 36.6 (30 Aug 2021 16:38)  T(F): 97.8 (30 Aug 2021 16:38), Max: 97.8 (30 Aug 2021 16:38)  HR: 63 (30 Aug 2021 16:38) (63 - 63)  BP: 184/67 (30 Aug 2021 16:38) (184/67 - 184/67)  BP(mean): --  RR: 20 (30 Aug 2021 16:38) (20 - 20)  SpO2: 98% (30 Aug 2021 16:38) (98% - 98%)    Daily     Daily       PHYSICAL EXAM:    Appearance: Alert, responsive, in no acute distress.    Musculoskeletal:       Left Lower Extremity: No obvious deformity. NROM. Atraumatic. Nontender. Skin is clean, dry, and intact. No abrasions, ecchymosis, or erythema. No bleeding. Sensation is grossly intact to light touch. Hip flexion 5/5, KF/KE 5/5, Dorsi/plantarflexion 5/5, + dorsi/plantarflexion/EHL/FHL. PT/DP pulse 2+. Cap refill < 2 seconds. No cyanosis. No signs of venous insufficiency or stasis.        Right Lower Extremity: + TTP to right hip. No open wounds noted. No bleeding. Skin clean, dry, intact. +DF/PF/EHL/FHL. Sensation grossly intact to light touch distally. 2+ DP pulse.       Imaging Studies:  Xray - right hip fx        A/P:  Pt is a  89y Female with right IT fx     PLAN:   * Plan and images discussed with Dr. Leach  * NPO for OR tomorrow  * IV fluids ordered and to start once NPO  * Pre-operative ABX ordered  * Single dose anticoaguation ordered  * Medical clearance requested for procedure  * Bed rest   * anesthesia made aware   * Continue care as per primary team

## 2021-08-30 NOTE — H&P ADULT - HISTORY OF PRESENT ILLNESS
88yo F w/ PMH of CAD s/p stentsx2, HTN, DM, and cholelithiasis who presents after a mechanical fall down 3 steps at home. Daughter who is at bedside helping provide history was home, but fall was unwitnessed. Reports that patient lost balance while trying to go up steps. Denies hitting head or loss of consciousness, but does mention that patient had a hair clip that was found broken. Complaining pain mostly at right hip. Also reports some loss of sensation at left leg, though this comes and goes and was present before fall. No nausea, vomiting, fever, chills, chest pain, or SOB.

## 2021-08-30 NOTE — H&P ADULT - NSHPPHYSICALEXAM_GEN_ALL_CORE
Constitutional: Elderly female in no acute distress  HEENT: Head is normocephalic and atraumatic, maxillofacial structures stable, no blood or discharge from nares or oral cavity, no abrams sign / raccoon eyes, EOMI b/l, no active drainage or redness  Neck: Full ROM, supple, trachea midline  Respiratory: Respirations are unlabored, no accessory muscle use, no conversational dyspnea  Cardiovascular: Regular rate & rhythm, +S1, S2  Chest: Chest wall is mildly tender to palpation on left, no subQ emphysema or crepitus palpated  Gastrointestinal: Abdomen soft, non-tender, non-distended, no rebound tenderness / guarding,   Extremities: moving all extremities spontaneously, mild point tenderness of left shoulder. RLE shortened and externally rotated, tender with manipulation.  Pelvis: stable  Vascular: 2+ radial and DP pulses b/l  Neurological: GCS: 15 (4/5/6). no gross sensory / motor / coordination deficits  Back: no C/T/LS spine tenderness to palpation, no step-offs or signs of external trauma to the back

## 2021-08-30 NOTE — ED ADULT NURSE NOTE - OBJECTIVE STATEMENT
Patient presents to ER C/O pain to right hip, patient sustained fall this afternoon "about 3 steps), denies LOC, no blood thinners, no obvious deformities noted, resp even/unlabored, lungs CTAB.

## 2021-08-30 NOTE — ED ADULT TRIAGE NOTE - CHIEF COMPLAINT QUOTE
Patient BIBA to ED from home with c/o fall down about 3 steps injuring her right leg.  No LOC as per EMS via family and no blood thinner use.

## 2021-08-30 NOTE — PRE-ANESTHESIA EVALUATION ADULT - NSANTHADDINFOFT_GEN_ALL_CORE
given the patient's need for urgent repair of this hip fracture she is tentatively cleared from an anesthetic perspective; further recommendations regarding her cardiac status would be appreciated

## 2021-08-30 NOTE — H&P ADULT - ATTENDING COMMENTS
Patient seen and examined with resident. Pt c/o slight TTP lateral L chest, will obtain xrays to r/o rib fractures (AP obtained, will get lateral as well). Will admit.

## 2021-08-30 NOTE — ED PROVIDER NOTE - ATTENDING CONTRIBUTION TO CARE
seen with res  s/p fall down 6 steps and opain R femur  pe as doc  xray sig intertroch fx admit  agree   lab xray ortho trauma

## 2021-08-30 NOTE — H&P ADULT - NSICDXPASTMEDICALHX_GEN_ALL_CORE_FT
PAST MEDICAL HISTORY:  CAD (coronary artery disease)     DM (diabetes mellitus) diet-controlled    History of cholelithiasis     HTN (hypertension)     Hypercholesteremia

## 2021-08-30 NOTE — ED PROVIDER NOTE - OBJECTIVE STATEMENT
88 yo female with above cc. Pt fell down approx 6 steps this afternoon and has pain to the R thigh  Pt states she cannot bear weight on her R LE  She denies pain to any other parts of her body  Med hx unknown  Daughter called  for information  Hx obtained from  who speaks Janelle

## 2021-08-30 NOTE — PATIENT PROFILE ADULT - FUNCTIONAL SCREEN CURRENT LEVEL: COMMUNICATION, MLM
Visit Information Date & Time Provider Department Dept. Phone Encounter #  
 3/17/2017  8:45 AM Misti Lucero, [address] [phone] [id number] Follow-up Instructions Return if symptoms worsen or fail to improve. Upcoming Health Maintenance Date Due DTaP/Tdap/Td series (1 - Tdap) 12/29/1992 PAP AKA CERVICAL CYTOLOGY 12/29/1992 INFLUENZA AGE 9 TO ADULT 8/1/2016 Allergies as of 3/17/2017  Review Complete On: 3/17/2017 By: Misti Lucero NP Severity Noted Reaction Type Reactions Banana  06/07/2013    Hives Vomiting Nsaids (Non-steroidal Anti-inflammatory Drug)  12/26/2013    Other (comments) Exacerbation of Ulcerative Colitis Sulfa (Sulfonamide Antibiotics)  10/16/2011    Nausea and Vomiting  
 Sulfa (Sulfonamide Antibiotics)  06/07/2013    Hives Current Immunizations  Never Reviewed No immunizations on file. Not reviewed this visit You Were Diagnosed With   
  
 Codes Comments Mild intermittent asthma without complication    -  Primary ICD-10-CM: J45.20 ICD-9-CM: 493.90 Gastroesophageal reflux disease with esophagitis     ICD-10-CM: K21.0 ICD-9-CM: 530.11 Vitals BP Pulse Temp Resp Height(growth percentile) Weight(growth percentile) 125/89 (BP 1 Location: Right arm, BP Patient Position: Sitting) 89 97.1 °F (36.2 °C) (Oral) 22 5' 6\" (1.676 m) 175 lb (79.4 kg) LMP SpO2 BMI OB Status Smoking Status 03/01/2017 (Exact Date) 95% 28.25 kg/m2 Having regular periods Never Smoker BMI and BSA Data Body Mass Index Body Surface Area  
 28.25 kg/m 2 1.92 m 2 Preferred Pharmacy Pharmacy Name Phone [facility] [phone] Your Updated Medication List  
  
   
This list is accurate as of: 3/17/17  9:25 AM.  Always use your most recent med list.  
  
  
  
  
 albuterol 90 mcg/actuation inhaler Commonly known as:  PROVENTIL HFA, VENTOLIN HFA, PROAIR HFA Take 2 Puffs by inhalation every six (6) hours as needed for Wheezing. clindinium-chlordiazePOXIDE 5-2.5 mg per capsule Commonly known as:  Rande Mary TAKE ONE CAPSULE BY MOUTH BEFORE MEALS AND AT BEDTIME prn Dexlansoprazole 60 mg Cpdb Commonly known as:  DEXILANT Take 1 Cap by mouth Daily (before breakfast). REMICADE IV  
412 mg by IntraVENous route every two (2) months. sucralfate 100 mg/mL suspension Commonly known as:  Danney Raúl Take 10 mL by mouth four (4) times daily. Prescriptions Sent to Pharmacy Refills Dexlansoprazole (DEXILANT) 60 mg CpDB 3 Sig: Take 1 Cap by mouth Daily (before breakfast). Class: Normal  
 [facility] [phone] Route: Oral  
 albuterol (PROVENTIL HFA, VENTOLIN HFA, PROAIR HFA) 90 mcg/actuation inhaler 3 Sig: Take 2 Puffs by inhalation every six (6) hours as needed for Wheezing. Class: Normal  
 Pharmacy: [facility] [employer] [facility] #: [phone] Route: Inhalation Follow-up Instructions Return if symptoms worsen or fail to improve. Patient Instructions Asthma Action Plan: After Your Visit Your Care Instructions An asthma action plan is based on peak flow and asthma symptoms. Sorting symptoms and peak flow into red, yellow, and green \"zones\" can help you know how bad your asthma is and what actions you should take. Work with your doctor to make your plan. An action plan may include: · The peak flow readings and symptoms for each zone. · What medicines to take in each zone. · When to call a doctor. · A list of emergency contact numbers. · A list of your asthma triggers. Follow-up care is a key part of your treatment and safety.  Be sure to make and go to all appointments, and call your doctor if you are having problems. It's also a good idea to know your test results and keep a list of the medicines you take. How can you care for yourself at home? · Take your daily medicines to help minimize long-term damage and avoid asthma attacks. · Check your peak flow every morning and evening. This is the best way to know how well your lungs are working. · Check your action plan to see what zone you are in. 
¨ If you are in the green zone, keep taking your daily asthma medicines as prescribed. ¨ If you are in the yellow zone, you may be having or will soon have an asthma attack. You may not have any symptoms, but your lungs are not working as well as they should. Take the medicines listed in your action plan. If you stay in the yellow zone, your doctor may need to increase the dose or add a medicine. ¨ If you are in the red zone, follow your action plan. If your symptoms or peak flow don't improve soon, you may need to go to the emergency room or be admitted to the hospital. 
· Use an asthma diary. Write down your peak flow readings in the asthma diary. If you have an attack, write down what caused it (if you know), the symptoms, and what medicine you took. · Make sure you know how and when to call your doctor or go to the hospital. 
· Take both the asthma action plan and the asthma diaryalong with your peak flow meter and medicineswhen you see your doctor. Tell your doctor if you are having trouble following your action plan. When should you call for help? Call 911 anytime you think you may need emergency care. For example, call if: 
· You have severe trouble breathing. Call your doctor now or seek immediate medical care if: 
· Your symptoms do not get better after you have followed your asthma action plan. · You cough up yellow, dark brown, or bloody mucus (sputum). Watch closely for changes in your health, and be sure to contact your doctor if: 
· Your coughing and wheezing get worse. · You need to use quick-relief medicine on more than 2 days a week (unless it is just for exercise). · You need help figuring out what is triggering your asthma attacks. Where can you learn more? Go to [employer] Enter [phone] in the search box to learn more about \"Asthma Action Plan: After Your Visit. \"  
© 6827-0947 Healthwise, Incorporated. Care instructions adapted under license by Marga Scott (which disclaims liability or warranty for this information). This care instruction is for use with your licensed healthcare professional. If you have questions about a medical condition or this instruction, always ask your healthcare professional. [address] any warranty or liability for your use of this information. Content Version: 15.1.674751; Last Revised: March 9, 2012 Introducing [facility] SERVICES! Marga Scott introduces [employer] patient portal. Now you can access parts of your medical record, email your doctor's office, and request medication refills online. 1. In your internet browser, go to [email or web address]://[email or web address]. Tier 3/[employer] 2. Click on the First Time User? Click Here link in the Sign In box. You will see the New Member Sign Up page. 3. Enter your [employer] Access Code exactly as it appears below. You will not need to use this code after youve completed the sign-up process. If you do not sign up before the expiration date, you must request a new code. · [employer] Access Code: [email or web address] Expires: 6/15/2017  8:50 AM 
 
4. Enter the last four digits of your Social Security Number (xxxx) and Date of Birth (mm/dd/yyyy) as indicated and click Submit. You will be taken to the next sign-up page. 5. Create a [employer] ID. This will be your [employer] login ID and cannot be changed, so think of one that is secure and easy to remember. 6. Create a [employer] password. You can change your password at any time. 7. Enter your Password Reset Question and Answer. This can be used at a later time if you forget your password. 8. Enter your e-mail address. You will receive e-mail notification when new information is available in [address] 9. Click Sign Up. You can now view and download portions of your medical record. 10. Click the Download Summary menu link to download a portable copy of your medical information. If you have questions, please visit the Frequently Asked Questions section of the [employer] website. Remember, [employer] is NOT to be used for urgent needs. For medical emergencies, dial 911. Now available from your iPhone and Android! Please provide this summary of care documentation to your next provider. Your primary care clinician is listed as Namrata Wiley. If you have any questions after today's visit, please call [phone]. 0 = understands/communicates without difficulty

## 2021-08-30 NOTE — ED PROVIDER NOTE - EXTREMITY EXAM
prox to mid thigh ttp no obvious deformity distal p 2+ able to move knee ankle foot toes/right lower extremity findings

## 2021-08-30 NOTE — ED PROVIDER NOTE - CLINICAL SUMMARY MEDICAL DECISION MAKING FREE TEXT BOX
89 yo female fell down 6 steps and has pain to RLE at prox to mid thigh  pe as doc  plan iv iv meds labs xrays reassess  all info from Janelle  over video chat  Pt aware of treatment plan and agreed

## 2021-08-30 NOTE — H&P ADULT - NSHPLABSRESULTS_GEN_ALL_CORE
Vital Signs Last 24 Hrs  T(C): 36.1 (30 Aug 2021 21:54), Max: 36.6 (30 Aug 2021 16:38)  T(F): 97 (30 Aug 2021 21:54), Max: 97.8 (30 Aug 2021 16:38)  HR: 78 (30 Aug 2021 21:54) (63 - 78)  BP: 167/73 (30 Aug 2021 21:54) (167/73 - 184/67)  BP(mean): --  RR: 17 (30 Aug 2021 21:54) (17 - 20)  SpO2: 98% (30 Aug 2021 21:54) (98% - 98%)        LABS:                        11.2   11.18 )-----------( 304      ( 30 Aug 2021 17:45 )             35.7     08-30    139  |  104  |  23.9<H>  ----------------------------<  136<H>  5.1   |  22.0  |  1.18    Ca    9.1      30 Aug 2021 17:45    TPro  6.9  /  Alb  3.8  /  TBili  0.2<L>  /  DBili  x   /  AST  32<H>  /  ALT  14  /  AlkPhos  103  08-30    PT/INR - ( 30 Aug 2021 17:45 )   PT: 12.1 sec;   INR: 1.05 ratio       PTT - ( 30 Aug 2021 17:45 )  PTT:24.7 sec

## 2021-08-31 ENCOUNTER — TRANSCRIPTION ENCOUNTER (OUTPATIENT)
Age: 86
End: 2021-08-31

## 2021-08-31 DIAGNOSIS — Z95.5 PRESENCE OF CORONARY ANGIOPLASTY IMPLANT AND GRAFT: Chronic | ICD-10-CM

## 2021-08-31 DIAGNOSIS — S72.001A FRACTURE OF UNSPECIFIED PART OF NECK OF RIGHT FEMUR, INITIAL ENCOUNTER FOR CLOSED FRACTURE: ICD-10-CM

## 2021-08-31 DIAGNOSIS — S72.141A DISPLACED INTERTROCHANTERIC FRACTURE OF RIGHT FEMUR, INITIAL ENCOUNTER FOR CLOSED FRACTURE: ICD-10-CM

## 2021-08-31 LAB
A1C WITH ESTIMATED AVERAGE GLUCOSE RESULT: 5.9 % — HIGH (ref 4–5.6)
ANION GAP SERPL CALC-SCNC: 13 MMOL/L — SIGNIFICANT CHANGE UP (ref 5–17)
APTT BLD: 27.8 SEC — SIGNIFICANT CHANGE UP (ref 27.5–35.5)
BASOPHILS # BLD AUTO: 0.01 K/UL — SIGNIFICANT CHANGE UP (ref 0–0.2)
BASOPHILS NFR BLD AUTO: 0.1 % — SIGNIFICANT CHANGE UP (ref 0–2)
BUN SERPL-MCNC: 25.2 MG/DL — HIGH (ref 8–20)
CALCIUM SERPL-MCNC: 9.2 MG/DL — SIGNIFICANT CHANGE UP (ref 8.6–10.2)
CHLORIDE SERPL-SCNC: 104 MMOL/L — SIGNIFICANT CHANGE UP (ref 98–107)
CO2 SERPL-SCNC: 22 MMOL/L — SIGNIFICANT CHANGE UP (ref 22–29)
CREAT SERPL-MCNC: 1.01 MG/DL — SIGNIFICANT CHANGE UP (ref 0.5–1.3)
EOSINOPHIL # BLD AUTO: 0.03 K/UL — SIGNIFICANT CHANGE UP (ref 0–0.5)
EOSINOPHIL NFR BLD AUTO: 0.4 % — SIGNIFICANT CHANGE UP (ref 0–6)
ESTIMATED AVERAGE GLUCOSE: 123 MG/DL — HIGH (ref 68–114)
GLUCOSE BLDC GLUCOMTR-MCNC: 131 MG/DL — HIGH (ref 70–99)
GLUCOSE BLDC GLUCOMTR-MCNC: 134 MG/DL — HIGH (ref 70–99)
GLUCOSE BLDC GLUCOMTR-MCNC: 138 MG/DL — HIGH (ref 70–99)
GLUCOSE SERPL-MCNC: 137 MG/DL — HIGH (ref 70–99)
HCT VFR BLD CALC: 33.4 % — LOW (ref 34.5–45)
HGB BLD-MCNC: 10.3 G/DL — LOW (ref 11.5–15.5)
IMM GRANULOCYTES NFR BLD AUTO: 0.4 % — SIGNIFICANT CHANGE UP (ref 0–1.5)
INR BLD: 1.16 RATIO — SIGNIFICANT CHANGE UP (ref 0.88–1.16)
LYMPHOCYTES # BLD AUTO: 1.2 K/UL — SIGNIFICANT CHANGE UP (ref 1–3.3)
LYMPHOCYTES # BLD AUTO: 17.1 % — SIGNIFICANT CHANGE UP (ref 13–44)
MAGNESIUM SERPL-MCNC: 2 MG/DL — SIGNIFICANT CHANGE UP (ref 1.6–2.6)
MCHC RBC-ENTMCNC: 29.7 PG — SIGNIFICANT CHANGE UP (ref 27–34)
MCHC RBC-ENTMCNC: 30.8 GM/DL — LOW (ref 32–36)
MCV RBC AUTO: 96.3 FL — SIGNIFICANT CHANGE UP (ref 80–100)
MONOCYTES # BLD AUTO: 0.67 K/UL — SIGNIFICANT CHANGE UP (ref 0–0.9)
MONOCYTES NFR BLD AUTO: 9.6 % — SIGNIFICANT CHANGE UP (ref 2–14)
NEUTROPHILS # BLD AUTO: 5.07 K/UL — SIGNIFICANT CHANGE UP (ref 1.8–7.4)
NEUTROPHILS NFR BLD AUTO: 72.4 % — SIGNIFICANT CHANGE UP (ref 43–77)
PHOSPHATE SERPL-MCNC: 3.9 MG/DL — SIGNIFICANT CHANGE UP (ref 2.4–4.7)
PLATELET # BLD AUTO: 266 K/UL — SIGNIFICANT CHANGE UP (ref 150–400)
POTASSIUM SERPL-MCNC: 4.6 MMOL/L — SIGNIFICANT CHANGE UP (ref 3.5–5.3)
POTASSIUM SERPL-SCNC: 4.6 MMOL/L — SIGNIFICANT CHANGE UP (ref 3.5–5.3)
PROTHROM AB SERPL-ACNC: 13.4 SEC — SIGNIFICANT CHANGE UP (ref 10.6–13.6)
RBC # BLD: 3.47 M/UL — LOW (ref 3.8–5.2)
RBC # FLD: 13.4 % — SIGNIFICANT CHANGE UP (ref 10.3–14.5)
SODIUM SERPL-SCNC: 139 MMOL/L — SIGNIFICANT CHANGE UP (ref 135–145)
WBC # BLD: 7.01 K/UL — SIGNIFICANT CHANGE UP (ref 3.8–10.5)
WBC # FLD AUTO: 7.01 K/UL — SIGNIFICANT CHANGE UP (ref 3.8–10.5)

## 2021-08-31 PROCEDURE — 27245 TREAT THIGH FRACTURE: CPT | Mod: RT

## 2021-08-31 PROCEDURE — 73502 X-RAY EXAM HIP UNI 2-3 VIEWS: CPT | Mod: 26,RT

## 2021-08-31 PROCEDURE — 99231 SBSQ HOSP IP/OBS SF/LOW 25: CPT | Mod: GC

## 2021-08-31 PROCEDURE — 27095 INJECTION FOR HIP X-RAY: CPT | Mod: RT

## 2021-08-31 PROCEDURE — 71046 X-RAY EXAM CHEST 2 VIEWS: CPT | Mod: 26

## 2021-08-31 PROCEDURE — 73030 X-RAY EXAM OF SHOULDER: CPT | Mod: 26,LT

## 2021-08-31 RX ORDER — FAMOTIDINE 10 MG/ML
40 INJECTION INTRAVENOUS AT BEDTIME
Refills: 0 | Status: DISCONTINUED | OUTPATIENT
Start: 2021-08-31 | End: 2021-09-08

## 2021-08-31 RX ORDER — SODIUM CHLORIDE 9 MG/ML
1000 INJECTION INTRAMUSCULAR; INTRAVENOUS; SUBCUTANEOUS
Refills: 0 | Status: DISCONTINUED | OUTPATIENT
Start: 2021-08-31 | End: 2021-09-01

## 2021-08-31 RX ORDER — LOSARTAN POTASSIUM 100 MG/1
12.5 TABLET, FILM COATED ORAL
Refills: 0 | Status: DISCONTINUED | OUTPATIENT
Start: 2021-08-31 | End: 2021-09-08

## 2021-08-31 RX ORDER — CEFAZOLIN SODIUM 1 G
2000 VIAL (EA) INJECTION
Refills: 0 | Status: COMPLETED | OUTPATIENT
Start: 2021-08-31 | End: 2021-08-31

## 2021-08-31 RX ORDER — SODIUM CHLORIDE 9 MG/ML
1000 INJECTION, SOLUTION INTRAVENOUS
Refills: 0 | Status: DISCONTINUED | OUTPATIENT
Start: 2021-08-31 | End: 2021-08-31

## 2021-08-31 RX ORDER — PANTOPRAZOLE SODIUM 20 MG/1
40 TABLET, DELAYED RELEASE ORAL
Refills: 0 | Status: DISCONTINUED | OUTPATIENT
Start: 2021-08-31 | End: 2021-09-08

## 2021-08-31 RX ORDER — ONDANSETRON 8 MG/1
4 TABLET, FILM COATED ORAL EVERY 6 HOURS
Refills: 0 | Status: DISCONTINUED | OUTPATIENT
Start: 2021-08-31 | End: 2021-08-31

## 2021-08-31 RX ORDER — CEFAZOLIN SODIUM 1 G
2000 VIAL (EA) INJECTION ONCE
Refills: 0 | Status: COMPLETED | OUTPATIENT
Start: 2021-08-31 | End: 2021-08-31

## 2021-08-31 RX ORDER — SIMVASTATIN 20 MG/1
40 TABLET, FILM COATED ORAL AT BEDTIME
Refills: 0 | Status: DISCONTINUED | OUTPATIENT
Start: 2021-08-31 | End: 2021-09-08

## 2021-08-31 RX ORDER — ATENOLOL 25 MG/1
25 TABLET ORAL DAILY
Refills: 0 | Status: DISCONTINUED | OUTPATIENT
Start: 2021-08-31 | End: 2021-09-08

## 2021-08-31 RX ORDER — FENTANYL CITRATE 50 UG/ML
25 INJECTION INTRAVENOUS
Refills: 0 | Status: DISCONTINUED | OUTPATIENT
Start: 2021-08-31 | End: 2021-08-31

## 2021-08-31 RX ORDER — ASPIRIN/CALCIUM CARB/MAGNESIUM 324 MG
81 TABLET ORAL DAILY
Refills: 0 | Status: DISCONTINUED | OUTPATIENT
Start: 2021-08-31 | End: 2021-08-31

## 2021-08-31 RX ORDER — ENOXAPARIN SODIUM 100 MG/ML
40 INJECTION SUBCUTANEOUS DAILY
Refills: 0 | Status: DISCONTINUED | OUTPATIENT
Start: 2021-09-01 | End: 2021-09-08

## 2021-08-31 RX ADMIN — LOSARTAN POTASSIUM 12.5 MILLIGRAM(S): 100 TABLET, FILM COATED ORAL at 05:25

## 2021-08-31 RX ADMIN — Medication 650 MILLIGRAM(S): at 18:09

## 2021-08-31 RX ADMIN — TRAMADOL HYDROCHLORIDE 50 MILLIGRAM(S): 50 TABLET ORAL at 17:16

## 2021-08-31 RX ADMIN — LIDOCAINE 1 PATCH: 4 CREAM TOPICAL at 18:10

## 2021-08-31 RX ADMIN — PANTOPRAZOLE SODIUM 40 MILLIGRAM(S): 20 TABLET, DELAYED RELEASE ORAL at 05:26

## 2021-08-31 RX ADMIN — Medication 100 MILLIGRAM(S): at 19:49

## 2021-08-31 RX ADMIN — SODIUM CHLORIDE 75 MILLILITER(S): 9 INJECTION INTRAMUSCULAR; INTRAVENOUS; SUBCUTANEOUS at 21:30

## 2021-08-31 RX ADMIN — FAMOTIDINE 40 MILLIGRAM(S): 10 INJECTION INTRAVENOUS at 21:30

## 2021-08-31 RX ADMIN — SIMVASTATIN 40 MILLIGRAM(S): 20 TABLET, FILM COATED ORAL at 23:03

## 2021-08-31 RX ADMIN — TRAMADOL HYDROCHLORIDE 50 MILLIGRAM(S): 50 TABLET ORAL at 16:46

## 2021-08-31 RX ADMIN — SODIUM CHLORIDE 125 MILLILITER(S): 9 INJECTION, SOLUTION INTRAVENOUS at 00:01

## 2021-08-31 RX ADMIN — ATENOLOL 25 MILLIGRAM(S): 25 TABLET ORAL at 05:26

## 2021-08-31 RX ADMIN — LIDOCAINE 1 PATCH: 4 CREAM TOPICAL at 19:00

## 2021-08-31 RX ADMIN — Medication 650 MILLIGRAM(S): at 05:26

## 2021-08-31 RX ADMIN — Medication 650 MILLIGRAM(S): at 18:37

## 2021-08-31 RX ADMIN — TRAMADOL HYDROCHLORIDE 50 MILLIGRAM(S): 50 TABLET ORAL at 09:18

## 2021-08-31 RX ADMIN — LOSARTAN POTASSIUM 12.5 MILLIGRAM(S): 100 TABLET, FILM COATED ORAL at 18:10

## 2021-08-31 RX ADMIN — SENNA PLUS 1 TABLET(S): 8.6 TABLET ORAL at 21:30

## 2021-08-31 NOTE — PROGRESS NOTE ADULT - SUBJECTIVE AND OBJECTIVE BOX
Post-op Check    Subjective:  Pt offers no acute complaints at this time. Pain well controlled on current regiment. Denies chest pain, SOB, palpitations.     STATUS POST:  right im nail for intertrochanteric fracture     POST OPERATIVE DAY #: 0    MEDICATIONS  (STANDING):  acetaminophen   Tablet .. 650 milliGRAM(s) Oral every 6 hours  ATENolol  Tablet 25 milliGRAM(s) Oral daily  dextrose 40% Gel 15 Gram(s) Oral once  dextrose 5%. 1000 milliLiter(s) (50 mL/Hr) IV Continuous <Continuous>  dextrose 5%. 1000 milliLiter(s) (100 mL/Hr) IV Continuous <Continuous>  dextrose 50% Injectable 25 Gram(s) IV Push once  dextrose 50% Injectable 12.5 Gram(s) IV Push once  dextrose 50% Injectable 25 Gram(s) IV Push once  famotidine    Tablet 40 milliGRAM(s) Oral at bedtime  glucagon  Injectable 1 milliGRAM(s) IntraMuscular once  insulin lispro (ADMELOG) corrective regimen sliding scale   SubCutaneous three times a day before meals  lactated ringers. 1000 milliLiter(s) (125 mL/Hr) IV Continuous <Continuous>  lidocaine   4% Patch 1 Patch Transdermal daily  losartan 12.5 milliGRAM(s) Oral two times a day  pantoprazole    Tablet 40 milliGRAM(s) Oral before breakfast  senna 1 Tablet(s) Oral at bedtime  simvastatin 40 milliGRAM(s) Oral at bedtime  sodium chloride 0.9%. 1000 milliLiter(s) (75 mL/Hr) IV Continuous <Continuous>    MEDICATIONS  (PRN):  traMADol 25 milliGRAM(s) Oral every 6 hours PRN Moderate Pain (4 - 6)  traMADol 50 milliGRAM(s) Oral every 6 hours PRN Severe Pain (7 - 10)      Vital Signs Last 24 Hrs  T(C): 37.1 (31 Aug 2021 20:46), Max: 37.1 (31 Aug 2021 20:46)  T(F): 98.8 (31 Aug 2021 20:46), Max: 98.8 (31 Aug 2021 20:46)  HR: 69 (31 Aug 2021 20:46) (55 - 73)  BP: 179/69 (31 Aug 2021 20:46) (109/50 - 179/69)  BP(mean): 64 (31 Aug 2021 15:30) (64 - 80)  RR: 18 (31 Aug 2021 20:46) (14 - 18)  SpO2: 91% (31 Aug 2021 20:46) (91% - 100%)    Physical Exam:    Constitutional: NAD  HEENT: PERRL, EOMI  Neck: No JVD, FROM without pain  Respiratory: no accessory muscle use, respirations non-labored  GI:   Neurological: A&O x 3; without gross deficit    A:     P:  Continue current care  neurovascular checks to rle  continue home medications   needs PT/OT  Pain control  OOB as tolerated  Encourage IS  DVT ppx

## 2021-08-31 NOTE — CHART NOTE - NSCHARTNOTEFT_GEN_A_CORE
Tertiary Trauma Survey (TTS)    Date of TTS: 8/31/2021                            Time: 11:43  Admit Date:  8/30/2021                            Trauma Activation:  Admit GCS: E-     V-     M-     HPI:  88yo F w/ PMH of CAD s/p stentsx2, HTN, DM, and cholelithiasis who presents after a mechanical fall down 3 steps at home. Daughter who is at bedside helping provide history was home, but fall was unwitnessed. Reports that patient lost balance while trying to go up steps. Denies hitting head or loss of consciousness, but does mention that patient had a hair clip that was found broken. Complaining pain mostly at right hip. Also reports some loss of sensation at left leg, though this comes and goes and was present before fall. No nausea, vomiting, fever, chills, chest pain, or SOB.        PAST MEDICAL & SURGICAL HISTORY:  HTN (hypertension)    DM (diabetes mellitus)  diet-controlled    CAD (coronary artery disease)    History of cholelithiasis    Hypercholesteremia    S/P coronary artery stent placement      [  ] No significant past history as reviewed with the patient and family    FAMILY HISTORY:    [  ] Family history not pertinent as reviewed with the patient and family    SOCIAL HISTORY:    Medications (inpatient): acetaminophen   Tablet .. 650 milliGRAM(s) Oral every 6 hours  ATENolol  Tablet 25 milliGRAM(s) Oral daily  dextrose 40% Gel 15 Gram(s) Oral once  dextrose 5%. 1000 milliLiter(s) IV Continuous <Continuous>  dextrose 5%. 1000 milliLiter(s) IV Continuous <Continuous>  dextrose 50% Injectable 25 Gram(s) IV Push once  dextrose 50% Injectable 12.5 Gram(s) IV Push once  dextrose 50% Injectable 25 Gram(s) IV Push once  famotidine    Tablet 40 milliGRAM(s) Oral at bedtime  glucagon  Injectable 1 milliGRAM(s) IntraMuscular once  insulin lispro (ADMELOG) corrective regimen sliding scale   SubCutaneous three times a day before meals  lactated ringers. 1000 milliLiter(s) IV Continuous <Continuous>  lidocaine   4% Patch 1 Patch Transdermal daily  losartan 12.5 milliGRAM(s) Oral two times a day  pantoprazole    Tablet 40 milliGRAM(s) Oral before breakfast  senna 1 Tablet(s) Oral at bedtime  simvastatin 40 milliGRAM(s) Oral at bedtime    Medications (PRN):traMADol 25 milliGRAM(s) Oral every 6 hours PRN  traMADol 50 milliGRAM(s) Oral every 6 hours PRN    Allergies: No Known Allergies  (Intolerances: )    Vital Signs Last 24 Hrs  T(C): 37 (31 Aug 2021 10:02), Max: 37 (31 Aug 2021 10:02)  T(F): 98.6 (31 Aug 2021 10:02), Max: 98.6 (31 Aug 2021 10:02)  HR: 64 (31 Aug 2021 10:02) (57 - 78)  BP: 169/91 (31 Aug 2021 10:02) (120/73 - 184/67)  BP(mean): --  RR: 16 (31 Aug 2021 10:02) (16 - 20)  SpO2: 100% (31 Aug 2021 10:02) (95% - 100%)  Drug Dosing Weight    Weight (kg): 63.5 (31 Aug 2021 11:21)    Exam:  Head: NCAT, MMM  Eyes: normal visual acuity, EOMI  Nose: no septal hematoma  Neuro: CN 2-12 intact, normal activity, normal strength, SILT bilaterally  Neck: soft, supple  Chest: normal work of breathing, chest expansion  Pulm: comfortable, no accessory muscle use  Abd: soft, nt, nd  Back: no e/o trauma or abrasions, no midline tenderness  Ext: left hip atraumatic, nontender. Right hip tender to palpation, shortened, externally rotated. 2+ peripheral pulses                           10.3   7.01  )-----------( 266      ( 31 Aug 2021 09:24 )             33.4     08-31    139  |  104  |  25.2<H>  ----------------------------<  137<H>  4.6   |  22.0  |  1.01    Ca    9.2      31 Aug 2021 09:24  Phos  3.9     08-31  Mg     2.0     08-31    TPro  6.9  /  Alb  3.8  /  TBili  0.2<L>  /  DBili  x   /  AST  32<H>  /  ALT  14  /  AlkPhos  103  08-30    PT/INR - ( 31 Aug 2021 09:24 )   PT: 13.4 sec;   INR: 1.16 ratio         PTT - ( 31 Aug 2021 09:24 )  PTT:27.8 sec      List Injuries Identified to Date:    List Operative and Interventional Radiological Procedures:     Consults (Date):  [  ] Orthopedics      RADIOLOGICAL FINDINGS REVIEW:   Hip X-ray: Right IT fracture       A/P:  - NPO for OR with ortho tomorrow   - Cefazolin, IVF  - Tylenol, Toradol PRN for pain. Fentanyl, morphine IV boluses. 4% lidocaine patch.

## 2021-08-31 NOTE — DISCHARGE NOTE PROVIDER - NS AS DC PROVIDER CONTACT Y/N MULTI
"Occupational Therapy Evaluation completed.   Functional Status:  Pt admitted for L tib/fib fx. Pt also had a posterior malleolus fx. Pt s/p IM jina. Pt is TTWB of LLE in boot. Pt able to get OOB with SPV. Pt transferred with SPV. Pt toilet transferred Mod I with crutches. Pt able to ambulate 500'. Pt dressed self with Independent. Pt has no further acute OT needs.   Plan of Care: Patient with no further skilled OT needs in the acute care setting at this time  Discharge Recommendations:  Equipment: No Equipment Needed. Post-acute therapy Currently anticipate no further skilled therapy needs once patient is discharged from the inpatient setting.    See \"Rehab Therapy-Acute\" Patient Summary Report for complete documentation.    "
"Physical Therapy Evaluation completed.   Bed Mobility:  Supine to Sit: NT, up with nursing upon arrival  Transfers: Sit to Stand: Supervised  Gait: Level Of Assist: Supervised with Crutches       Plan of Care: Patient with no further skilled PT needs in the acute care setting and demonstrates safety with mobility in this environment at this time.   Discharge Recommendations: Equipment: Crutches. Recommend outpatient physical therapy services to address higher level deficits when medically cleared.    See \"Rehab Therapy-Acute\" Patient Summary Report for complete documentation.     Pt is a 41yo male s/p L tibia IMN and screw fixation of L posterior malleolus fx on 11/30 after injuring LLE during [employer] practice. LLE is TTWB in a walking boot. Received pt ambulating in halls with RN after walking down to lobby. Trained pt on use of axillary crutches and stair negotiation. Pt able to ambulate > 500ft with crutches while maintaining L TTWB and negotiate steps with B rails. Reports has B rails at home. Pt will need crutches for DC home, recommend outpatient PT when medically cleared. Patient will not be actively followed for physical therapy services at this time, however may be seen if requested by physician for 1 more visit within 30 days to address any discharge or equipment needs.  "
Yes

## 2021-08-31 NOTE — DISCHARGE NOTE PROVIDER - NSDCCPTREATMENT_GEN_ALL_CORE_FT
PRINCIPAL PROCEDURE  Procedure: Nail fixation of intertrochanteric fracture  Findings and Treatment:

## 2021-08-31 NOTE — PROGRESS NOTE ADULT - SUBJECTIVE AND OBJECTIVE BOX
ACS Surgery Progress Note     Subjective/24hour Events: Patient is a 88 yo F PMHx CAD s/p 2 stents, HTN, DM found to have a R hip fracture after fall. VSS, patient stable. Complaining of R hip pain since she fell down the stairs at her home. Unable to bear weight on her R leg. No headache, n/v, chest pain, SOB, abdominal pain, dysuria, hematuria, leg swelling, or any other complaints.     MEDICATIONS  (STANDING):  acetaminophen   Tablet .. 650 milliGRAM(s) Oral every 6 hours  ATENolol  Tablet 25 milliGRAM(s) Oral daily  dextrose 40% Gel 15 Gram(s) Oral once  dextrose 5%. 1000 milliLiter(s) (50 mL/Hr) IV Continuous <Continuous>  dextrose 5%. 1000 milliLiter(s) (100 mL/Hr) IV Continuous <Continuous>  dextrose 50% Injectable 25 Gram(s) IV Push once  dextrose 50% Injectable 12.5 Gram(s) IV Push once  dextrose 50% Injectable 25 Gram(s) IV Push once  famotidine    Tablet 40 milliGRAM(s) Oral at bedtime  glucagon  Injectable 1 milliGRAM(s) IntraMuscular once  insulin lispro (ADMELOG) corrective regimen sliding scale   SubCutaneous three times a day before meals  lactated ringers. 1000 milliLiter(s) (125 mL/Hr) IV Continuous <Continuous>  lidocaine   4% Patch 1 Patch Transdermal daily  losartan 12.5 milliGRAM(s) Oral two times a day  pantoprazole    Tablet 40 milliGRAM(s) Oral before breakfast  senna 1 Tablet(s) Oral at bedtime  simvastatin 40 milliGRAM(s) Oral at bedtime    MEDICATIONS  (PRN):  traMADol 25 milliGRAM(s) Oral every 6 hours PRN Moderate Pain (4 - 6)  traMADol 50 milliGRAM(s) Oral every 6 hours PRN Severe Pain (7 - 10)      Vital Signs:  Vital Signs Last 24 Hrs  T(C): 37 (31 Aug 2021 10:02), Max: 37 (31 Aug 2021 10:02)  T(F): 98.6 (31 Aug 2021 10:02), Max: 98.6 (31 Aug 2021 10:02)  HR: 64 (31 Aug 2021 10:02) (57 - 78)  BP: 169/91 (31 Aug 2021 10:02) (120/73 - 184/67)  BP(mean): --  RR: 16 (31 Aug 2021 10:02) (16 - 20)  SpO2: 100% (31 Aug 2021 10:02) (95% - 100%)    CAPILLARY BLOOD GLUCOSE      POCT Blood Glucose.: 131 mg/dL (31 Aug 2021 09:01)  POCT Blood Glucose.: 138 mg/dL (31 Aug 2021 07:00)  POCT Blood Glucose.: 138 mg/dL (30 Aug 2021 22:15)      I&O's Detail      Physical Exam:  General: NAD, Pleasant  Neurology: Patient is AA&Ox4, follows commands, and speech fluent. EOMI intact, PERRLA, 3mm--2mm. Sensation in the Trigeminal distribution is wnl and symmetrical. Facial muscles intact and symmetrical. Hearing appropriate. Uvula rises equally upon phonation and tongue protrudes symmetrically. SCM/Trapezius 5/5 power.  Neck: Neck supple, trachea midline, No JVD  Respiratory: CTA B/L, (-)rales, rhonchi  CV: S1S2, r/r/r, (-)m/r/g  Abdomen: S/NT/ND, BSx4  Extremities: 2+ peripheral pulses bilat throughout; (-)edema appreciated    LE: R hip tender to palpation; shortened, externally rotated with limited ROM. Sensation intact. 2+ pulses. No obvious deformity. able to move knee ankle foot toes. L hip non tender.    UE: full ROM. 5/5 strength, sensation and reflexes intact. 2+ pulses.   Skin: No Rashes, Hematoma, Ecchymosis    Labs:    08-31    139  |  104  |  25.2<H>  ----------------------------<  137<H>  4.6   |  22.0  |  1.01    Ca    9.2      31 Aug 2021 09:24  Phos  3.9     08-31  Mg     2.0     08-31    TPro  6.9  /  Alb  3.8  /  TBili  0.2<L>  /  DBili  x   /  AST  32<H>  /  ALT  14  /  AlkPhos  103  08-30    LIVER FUNCTIONS - ( 30 Aug 2021 17:45 )  Alb: 3.8 g/dL / Pro: 6.9 g/dL / ALK PHOS: 103 U/L / ALT: 14 U/L / AST: 32 U/L / GGT: x                                 10.3   7.01  )-----------( 266      ( 31 Aug 2021 09:24 )             33.4     PT/INR - ( 31 Aug 2021 09:24 )   PT: 13.4 sec;   INR: 1.16 ratio         PTT - ( 31 Aug 2021 09:24 )  PTT:27.8 sec    Imaging:  Hip X-ray: Right IT fracture   CXR: slightly enlarged heart. Mild pulmonary congestion

## 2021-08-31 NOTE — DISCHARGE NOTE PROVIDER - NSDCMRMEDTOKEN_GEN_ALL_CORE_FT
aspirin 81 mg oral delayed release tablet: 1 tab(s) orally once a day  aspirin 81 mg oral tablet: 1 tab(s) orally once a day  atenolol 25 mg oral tablet: 1 tab(s) orally once a day  atenolol 25 mg oral tablet: 1 tab(s) orally once a day  famotidine 40 mg oral tablet: orally once a day  famotidine 40 mg oral tablet: 1 tab(s) orally once a day (at bedtime)  isosorbide mononitrate 30 mg oral tablet, extended release: 1 tab(s) orally once a day (in the morning)  losartan 25 mg oral tablet: 0.5 tab(s) orally 2 times a day  losartan 50 mg oral tablet: 1 tab(s) orally once a day  Melatonin 3 mg oral tablet: 1 tab(s) orally once a day (at bedtime)  metFORMIN 500 mg oral tablet: 1 tab(s) orally 2 times a day  multivitamin: orally once a day  omeprazole 20 mg oral delayed release capsule: 1 cap(s) orally once a day  omeprazole 20 mg oral delayed release tablet: 1 tab(s) orally once a day  oxycodone-acetaminophen 5 mg-325 mg oral tablet: 1 tab(s) orally every 6 hours  Senna 8.6 mg oral tablet: 2 tab(s) orally once a day (at bedtime)  simvastatin 40 mg oral tablet: 1 tab(s) orally once a day (at bedtime)  simvastatin 40 mg oral tablet: 1 tab(s) orally once a day (at bedtime)  sucralfate 1 g oral tablet: 1 tab(s) orally 4 times a day (before meals and at bedtime)   acetaminophen 325 mg oral tablet: 2 tab(s) orally every 6 hours  aspirin 81 mg oral tablet: 1 tab(s) orally once a day  atenolol 25 mg oral tablet: 1 tab(s) orally once a day  enoxaparin: 40 milligram(s) subcutaneous once a day  famotidine 40 mg oral tablet: 1 tab(s) orally once a day (at bedtime)  isosorbide mononitrate 30 mg oral tablet, extended release: 1 tab(s) orally once a day (in the morning)  lidocaine 4% topical film: Apply topically to affected area once a day  losartan 25 mg oral tablet: 0.5 tab(s) orally 2 times a day  losartan 50 mg oral tablet: 1 tab(s) orally once a day  Melatonin 3 mg oral tablet: 1 tab(s) orally once a day (at bedtime)  metFORMIN 500 mg oral tablet: 1 tab(s) orally 2 times a day  multivitamin: orally once a day  omeprazole 20 mg oral delayed release tablet: 1 tab(s) orally once a day  oxycodone-acetaminophen 5 mg-325 mg oral tablet: 1 tab(s) orally every 6 hours  Senna 8.6 mg oral tablet: 2 tab(s) orally once a day (at bedtime)  simvastatin 40 mg oral tablet: 1 tab(s) orally once a day (at bedtime)  sucralfate 1 g oral tablet: 1 tab(s) orally 4 times a day (before meals and at bedtime)

## 2021-08-31 NOTE — BRIEF OPERATIVE NOTE - COMMENTS
post-op plan: WBAT, PT/OT, ancef per protocol, contralateral SCD, LMWH (or equivalent) x 4 weeks post-op, likely f/u prn due to health issues and COVID 19 pandemic

## 2021-08-31 NOTE — DISCHARGE NOTE PROVIDER - NSDCCPCAREPLAN_GEN_ALL_CORE_FT
PRINCIPAL DISCHARGE DIAGNOSIS  Diagnosis: Closed right hip fracture  Assessment and Plan of Treatment: ORTHOPEDIC RECOMMENDATIONS:   The patient will be seen in the office between 2-3 weeks for wound check. Sutures/Staples/Tape will be removed at that time. Patient may shower after post-op day #3(9/3). The dressing is to be removed on post-op day #7(9/7). The patient will contact the office if the wound becomes red, has increasing pain, develops bleeding or discharge, an injury occurs, or has other concerns. The patient will continue PT for gait training. The patient will continue LOVENOX for 4 weeks for DVTP. pain control. The emir

## 2021-08-31 NOTE — PROGRESS NOTE ADULT - SUBJECTIVE AND OBJECTIVE BOX
Pt Name: KADY SUAREZ    MRN: [id number]      Patient is a 89y female s/p right hip IMN POD #0. Patient seen and examined at bedside. Patient attending bedside by daughter. Patient does not speak english - daughter interpreted. Pain is controlled with prescribed pain medication. Patient has not yet worked with PT at time of examination. Patient denies CP, SOB, numbness, tingling, fever/chills.      PAST MEDICAL & SURGICAL HISTORY:  Diabetes mellitus    CAD (coronary artery disease)  2 cardiac stents placed    Hypertension    Hyperlipidemia    HTN (hypertension)    DM (diabetes mellitus)  diet-controlled    CAD (coronary artery disease)    History of cholelithiasis    Hypercholesteremia    Bilateral cataracts    S/P coronary artery stent placement        Allergies: No Known Allergies                                  10.3   7.01  )-----------( 266      ( 31 Aug 2021 09:24 )             33.4     08-31    139  |  104  |  25.2<H>  ----------------------------<  137<H>  4.6   |  22.0  |  1.01    Ca    9.2      31 Aug 2021 09:24  Phos  3.9     08-31  Mg     2.0     08-31    TPro  6.9  /  Alb  3.8  /  TBili  0.2<L>  /  DBili  x   /  AST  32<H>  /  ALT  14  /  AlkPhos  103  08-30      PHYSICAL EXAM:    Vital Signs Last 24 Hrs  T(C): 37.1 (31 Aug 2021 20:46), Max: 37.1 (31 Aug 2021 20:46)  T(F): 98.8 (31 Aug 2021 20:46), Max: 98.8 (31 Aug 2021 20:46)  HR: 69 (31 Aug 2021 20:46) (55 - 73)  BP: 179/69 (31 Aug 2021 20:46) (109/50 - 179/69)  BP(mean): 64 (31 Aug 2021 15:30) (64 - 80)  RR: 18 (31 Aug 2021 20:46) (14 - 18)  SpO2: 91% (31 Aug 2021 20:46) (91% - 100%)  Daily     Daily     Appearance: Alert, responsive, in no acute distress.  Musculoskeletal:       Right Lower Extremity: + dressings clean, dry, intact with no bleeding or discharge noted. +DF/PF/EHL/FHL. Sensation grossly intact to light touch distally. 2+ DP pulse. BCR. Calf soft NT.       A/P:  Pt is a  89y Female with right hip IMN POD #0    PLAN:   - WBAT RLE   - Pain control  - PT   - DVTp   - Incentive spirometer encouraged   - Continue care per primary team

## 2021-08-31 NOTE — DISCHARGE NOTE PROVIDER - HOSPITAL COURSE
ORTHOPEDIC RECOMMENDATIONS:   The patient will be seen in the office between 2-3 weeks for wound check. Sutures/Staples/Tape will be removed at that time. Patient may shower after post-op day #3(9/3). The dressing is to be removed on post-op day #7(9/7). The patient will contact the office if the wound becomes red, has increasing pain, develops bleeding or discharge, an injury occurs, or has other concerns. The patient will continue PT for gait training. The patient will continue LOVENOX for 4 weeks for DVTP. pain control. The patient is FULL weight bearing.   HPI:  88yo F w/ PMH of CAD s/p stentsx2, HTN, DM, and cholelithiasis who presents after a mechanical fall down 3 steps at home. Daughter who is at bedside helping provide history was home, but fall was unwitnessed. Reports that patient lost balance while trying to go up steps. Denies hitting head or loss of consciousness, but does mention that patient had a hair clip that was found broken. Complaining pain mostly at right hip. Also reports some loss of sensation at left leg, though this comes and goes and was present before fall. No nausea, vomiting, fever, chills, chest pain, or SOB.  (30 Aug 2021 21:30)    Hospital Course:  Patient admitted to the Acute care surgery service with an acute right hip fracture. Patient evaluated by the Orthopedics team and taken to the OR for an ORIF. Patient went to medical/surgical floors. Patient was seen and evaluated by PT/OT and recommended for DEBBIE. At time of discharge patient is tolerating diet, pain is controlled and patient able to functionally complete ADLs.       ORTHOPEDIC RECOMMENDATIONS:   The patient will be seen in the office between 2-3 weeks for wound check. Sutures/Staples/Tape will be removed at that time. Patient may shower after post-op day #3(9/3). The dressing is to be removed on post-op day #7(9/7). The patient will contact the office if the wound becomes red, has increasing pain, develops bleeding or discharge, an injury occurs, or has other concerns. The patient will continue PT for gait training. The patient will continue LOVENOX for 4 weeks for DVTP. pain control. The patient is FULL weight bearing.

## 2021-08-31 NOTE — ASU PREOP CHECKLIST - COMMENTS
pt refuses language line  daughter interpeted pt refuses language line  daughter interpeted  received pt iv infiltratedv restarted left hand pt refuses language line  daughter interpeted  received pt iv infiltratedv restarted left hand    left ear noted  to be bruised from fall

## 2021-08-31 NOTE — PROGRESS NOTE ADULT - PROBLEM SELECTOR PLAN 1
- NPO for OR with ortho   - Cefazolin, IVF  - Tylenol, Toradol PRN for pain. Fentanyl, morphine IV boluses. 4% lidocaine patch.  - DVT ppx

## 2021-09-01 LAB
ABO RH CONFIRMATION: SIGNIFICANT CHANGE UP
ANION GAP SERPL CALC-SCNC: 8 MMOL/L — SIGNIFICANT CHANGE UP (ref 5–17)
BASOPHILS # BLD AUTO: 0.02 K/UL — SIGNIFICANT CHANGE UP (ref 0–0.2)
BASOPHILS NFR BLD AUTO: 0.3 % — SIGNIFICANT CHANGE UP (ref 0–2)
BUN SERPL-MCNC: 20.2 MG/DL — HIGH (ref 8–20)
CALCIUM SERPL-MCNC: 8.4 MG/DL — LOW (ref 8.6–10.2)
CHLORIDE SERPL-SCNC: 102 MMOL/L — SIGNIFICANT CHANGE UP (ref 98–107)
CO2 SERPL-SCNC: 26 MMOL/L — SIGNIFICANT CHANGE UP (ref 22–29)
COVID-19 SPIKE DOMAIN AB INTERP: POSITIVE
COVID-19 SPIKE DOMAIN ANTIBODY RESULT: >250 U/ML — HIGH
CREAT SERPL-MCNC: 0.83 MG/DL — SIGNIFICANT CHANGE UP (ref 0.5–1.3)
EOSINOPHIL # BLD AUTO: 0.1 K/UL — SIGNIFICANT CHANGE UP (ref 0–0.5)
EOSINOPHIL NFR BLD AUTO: 1.5 % — SIGNIFICANT CHANGE UP (ref 0–6)
GLUCOSE BLDC GLUCOMTR-MCNC: 115 MG/DL — HIGH (ref 70–99)
GLUCOSE BLDC GLUCOMTR-MCNC: 139 MG/DL — HIGH (ref 70–99)
GLUCOSE BLDC GLUCOMTR-MCNC: 147 MG/DL — HIGH (ref 70–99)
GLUCOSE BLDC GLUCOMTR-MCNC: 187 MG/DL — HIGH (ref 70–99)
GLUCOSE SERPL-MCNC: 162 MG/DL — HIGH (ref 70–99)
HCT VFR BLD CALC: 27.3 % — LOW (ref 34.5–45)
HGB BLD-MCNC: 8.4 G/DL — LOW (ref 11.5–15.5)
IMM GRANULOCYTES NFR BLD AUTO: 0.5 % — SIGNIFICANT CHANGE UP (ref 0–1.5)
LYMPHOCYTES # BLD AUTO: 0.45 K/UL — LOW (ref 1–3.3)
LYMPHOCYTES # BLD AUTO: 6.8 % — LOW (ref 13–44)
MAGNESIUM SERPL-MCNC: 1.9 MG/DL — SIGNIFICANT CHANGE UP (ref 1.6–2.6)
MCHC RBC-ENTMCNC: 29.7 PG — SIGNIFICANT CHANGE UP (ref 27–34)
MCHC RBC-ENTMCNC: 30.8 GM/DL — LOW (ref 32–36)
MCV RBC AUTO: 96.5 FL — SIGNIFICANT CHANGE UP (ref 80–100)
MONOCYTES # BLD AUTO: 0.53 K/UL — SIGNIFICANT CHANGE UP (ref 0–0.9)
MONOCYTES NFR BLD AUTO: 8 % — SIGNIFICANT CHANGE UP (ref 2–14)
NEUTROPHILS # BLD AUTO: 5.46 K/UL — SIGNIFICANT CHANGE UP (ref 1.8–7.4)
NEUTROPHILS NFR BLD AUTO: 82.9 % — HIGH (ref 43–77)
PHOSPHATE SERPL-MCNC: 3.4 MG/DL — SIGNIFICANT CHANGE UP (ref 2.4–4.7)
PLATELET # BLD AUTO: 218 K/UL — SIGNIFICANT CHANGE UP (ref 150–400)
POTASSIUM SERPL-MCNC: 4.4 MMOL/L — SIGNIFICANT CHANGE UP (ref 3.5–5.3)
POTASSIUM SERPL-SCNC: 4.4 MMOL/L — SIGNIFICANT CHANGE UP (ref 3.5–5.3)
RBC # BLD: 2.83 M/UL — LOW (ref 3.8–5.2)
RBC # FLD: 13.6 % — SIGNIFICANT CHANGE UP (ref 10.3–14.5)
SARS-COV-2 IGG+IGM SERPL QL IA: >250 U/ML — HIGH
SARS-COV-2 IGG+IGM SERPL QL IA: POSITIVE
SODIUM SERPL-SCNC: 136 MMOL/L — SIGNIFICANT CHANGE UP (ref 135–145)
WBC # BLD: 6.59 K/UL — SIGNIFICANT CHANGE UP (ref 3.8–10.5)
WBC # FLD AUTO: 6.59 K/UL — SIGNIFICANT CHANGE UP (ref 3.8–10.5)

## 2021-09-01 PROCEDURE — 99231 SBSQ HOSP IP/OBS SF/LOW 25: CPT

## 2021-09-01 RX ORDER — MAGNESIUM SULFATE 500 MG/ML
2 VIAL (ML) INJECTION ONCE
Refills: 0 | Status: COMPLETED | OUTPATIENT
Start: 2021-09-01 | End: 2021-09-01

## 2021-09-01 RX ORDER — MORPHINE SULFATE 50 MG/1
2 CAPSULE, EXTENDED RELEASE ORAL EVERY 4 HOURS
Refills: 0 | Status: DISCONTINUED | OUTPATIENT
Start: 2021-09-01 | End: 2021-09-06

## 2021-09-01 RX ORDER — CEFAZOLIN SODIUM 1 G
2000 VIAL (EA) INJECTION ONCE
Refills: 0 | Status: COMPLETED | OUTPATIENT
Start: 2021-09-01 | End: 2021-09-01

## 2021-09-01 RX ORDER — SODIUM CHLORIDE 9 MG/ML
1000 INJECTION INTRAMUSCULAR; INTRAVENOUS; SUBCUTANEOUS
Refills: 0 | Status: DISCONTINUED | OUTPATIENT
Start: 2021-09-01 | End: 2021-09-02

## 2021-09-01 RX ADMIN — Medication 650 MILLIGRAM(S): at 06:13

## 2021-09-01 RX ADMIN — SIMVASTATIN 40 MILLIGRAM(S): 20 TABLET, FILM COATED ORAL at 21:19

## 2021-09-01 RX ADMIN — TRAMADOL HYDROCHLORIDE 50 MILLIGRAM(S): 50 TABLET ORAL at 06:35

## 2021-09-01 RX ADMIN — Medication 650 MILLIGRAM(S): at 11:03

## 2021-09-01 RX ADMIN — PANTOPRAZOLE SODIUM 40 MILLIGRAM(S): 20 TABLET, DELAYED RELEASE ORAL at 06:17

## 2021-09-01 RX ADMIN — SODIUM CHLORIDE 75 MILLILITER(S): 9 INJECTION INTRAMUSCULAR; INTRAVENOUS; SUBCUTANEOUS at 21:19

## 2021-09-01 RX ADMIN — Medication 650 MILLIGRAM(S): at 23:29

## 2021-09-01 RX ADMIN — Medication 650 MILLIGRAM(S): at 17:20

## 2021-09-01 RX ADMIN — ATENOLOL 25 MILLIGRAM(S): 25 TABLET ORAL at 05:42

## 2021-09-01 RX ADMIN — Medication 100 MILLIGRAM(S): at 09:35

## 2021-09-01 RX ADMIN — LOSARTAN POTASSIUM 12.5 MILLIGRAM(S): 100 TABLET, FILM COATED ORAL at 05:42

## 2021-09-01 RX ADMIN — MORPHINE SULFATE 2 MILLIGRAM(S): 50 CAPSULE, EXTENDED RELEASE ORAL at 08:21

## 2021-09-01 RX ADMIN — TRAMADOL HYDROCHLORIDE 50 MILLIGRAM(S): 50 TABLET ORAL at 18:21

## 2021-09-01 RX ADMIN — MORPHINE SULFATE 2 MILLIGRAM(S): 50 CAPSULE, EXTENDED RELEASE ORAL at 08:35

## 2021-09-01 RX ADMIN — TRAMADOL HYDROCHLORIDE 50 MILLIGRAM(S): 50 TABLET ORAL at 17:19

## 2021-09-01 RX ADMIN — Medication 50 GRAM(S): at 11:03

## 2021-09-01 RX ADMIN — SENNA PLUS 1 TABLET(S): 8.6 TABLET ORAL at 21:18

## 2021-09-01 RX ADMIN — LIDOCAINE 1 PATCH: 4 CREAM TOPICAL at 05:45

## 2021-09-01 RX ADMIN — LOSARTAN POTASSIUM 12.5 MILLIGRAM(S): 100 TABLET, FILM COATED ORAL at 17:20

## 2021-09-01 RX ADMIN — SODIUM CHLORIDE 75 MILLILITER(S): 9 INJECTION, SOLUTION INTRAVENOUS at 05:42

## 2021-09-01 RX ADMIN — LIDOCAINE 1 PATCH: 4 CREAM TOPICAL at 11:04

## 2021-09-01 RX ADMIN — LIDOCAINE 1 PATCH: 4 CREAM TOPICAL at 23:27

## 2021-09-01 RX ADMIN — TRAMADOL HYDROCHLORIDE 50 MILLIGRAM(S): 50 TABLET ORAL at 00:30

## 2021-09-01 RX ADMIN — LIDOCAINE 1 PATCH: 4 CREAM TOPICAL at 19:14

## 2021-09-01 RX ADMIN — ENOXAPARIN SODIUM 40 MILLIGRAM(S): 100 INJECTION SUBCUTANEOUS at 11:03

## 2021-09-01 RX ADMIN — FAMOTIDINE 40 MILLIGRAM(S): 10 INJECTION INTRAVENOUS at 21:19

## 2021-09-01 RX ADMIN — TRAMADOL HYDROCHLORIDE 50 MILLIGRAM(S): 50 TABLET ORAL at 06:18

## 2021-09-01 RX ADMIN — TRAMADOL HYDROCHLORIDE 50 MILLIGRAM(S): 50 TABLET ORAL at 01:00

## 2021-09-01 RX ADMIN — Medication 650 MILLIGRAM(S): at 05:43

## 2021-09-01 NOTE — OCCUPATIONAL THERAPY INITIAL EVALUATION ADULT - MANUAL MUSCLE TESTING RESULTS, REHAB EVAL
not following commands for MMT, strength b/l  >3/5 with exception of left shoulder 2/5/grossly assessed due to

## 2021-09-01 NOTE — OCCUPATIONAL THERAPY INITIAL EVALUATION ADULT - OTHER, REHAB EVAL
as per pt, has hx left upper arm injury, painful with shoulder movement >90* shoulder flexion and external rotation, reportedly was having therapy for left UE prior to this admittance

## 2021-09-01 NOTE — PROGRESS NOTE ADULT - SUBJECTIVE AND OBJECTIVE BOX
Ortho Post Op Check    Name: KADY SUAREZ    MR #: [id number]    Procedure: Right hip IM nail  Surgeon: Dr Leach    Patient communicated with via language services Janelle  [id number] Santosh    Patient complaining of a great deal of pain in her right thigh/groin region   Denies CP, SOB, N/V, numbness/tingling             PAST MEDICAL & SURGICAL HISTORY:  Diabetes mellitus    CAD (coronary artery disease)  2 cardiac stents placed    Hypertension    Hyperlipidemia    HTN (hypertension)    DM (diabetes mellitus)  diet-controlled    CAD (coronary artery disease)    History of cholelithiasis    Hypercholesteremia    Bilateral cataracts    S/P coronary artery stent placement      General Exam:  Vital Signs Last 24 Hrs  T(C): 36.5 (09-01-21 @ 05:00), Max: 36.5 (09-01-21 @ 05:00)  T(F): 97.7 (09-01-21 @ 05:00), Max: 97.7 (09-01-21 @ 05:00)  HR: 70 (09-01-21 @ 05:00) (70 - 70)  BP: 163/84 (09-01-21 @ 05:00) (163/84 - 163/84)  BP(mean): --  RR: 18 (09-01-21 @ 05:00) (18 - 18)  SpO2: 94% (09-01-21 @ 05:00) (94% - 94%)    General: Pt Alert and oriented, NAD, controlled pain.  Right thigh dressings remain C/D/I   Pulses: 2+ dorsalis pedis pulse. Cap refill < 2 sec.  Sensation: Grossly intact to light touch without deficit.  Motor: + EHL/FHL/TA/GS  Calves soft, supple and nontender                             8.4    6.59  )-----------( 218      ( 01 Sep 2021 07:49 )             27.3       08-31    139  |  104  |  25.2<H>  ----------------------------<  137<H>  4.6   |  22.0  |  1.01    Ca    9.2      31 Aug 2021 09:24  Phos  3.9     08-31  Mg     2.0     08-31    TPro  6.9  /  Alb  3.8  /  TBili  0.2<L>  /  DBili  x   /  AST  32<H>  /  ALT  14  /  AlkPhos  103  08-30      MEDICATIONS  (STANDING):  acetaminophen   Tablet .. 650 milliGRAM(s) Oral every 6 hours  ATENolol  Tablet 25 milliGRAM(s) Oral daily  dextrose 40% Gel 15 Gram(s) Oral once  dextrose 5%. 1000 milliLiter(s) (50 mL/Hr) IV Continuous <Continuous>  dextrose 5%. 1000 milliLiter(s) (100 mL/Hr) IV Continuous <Continuous>  dextrose 50% Injectable 25 Gram(s) IV Push once  dextrose 50% Injectable 12.5 Gram(s) IV Push once  dextrose 50% Injectable 25 Gram(s) IV Push once  enoxaparin Injectable 40 milliGRAM(s) SubCutaneous daily  famotidine    Tablet 40 milliGRAM(s) Oral at bedtime  glucagon  Injectable 1 milliGRAM(s) IntraMuscular once  insulin lispro (ADMELOG) corrective regimen sliding scale   SubCutaneous three times a day before meals  lidocaine   4% Patch 1 Patch Transdermal daily  losartan 12.5 milliGRAM(s) Oral two times a day  pantoprazole    Tablet 40 milliGRAM(s) Oral before breakfast  senna 1 Tablet(s) Oral at bedtime  simvastatin 40 milliGRAM(s) Oral at bedtime  sodium chloride 0.9%. 1000 milliLiter(s) (75 mL/Hr) IV Continuous <Continuous>    MEDICATIONS  (PRN):  morphine  - Injectable 2 milliGRAM(s) IV Push every 4 hours PRN Severe Pain (7 - 10)/breakthrough pain  traMADol 25 milliGRAM(s) Oral every 6 hours PRN Moderate Pain (4 - 6)  traMADol 50 milliGRAM(s) Oral every 6 hours PRN Severe Pain (7 - 10)          Post-op X-Ray: reveals IM nail w cement       A/P: 89yFemale POD#1s/p right hip IM nail   - Stable  - Pain Control  - DVT ppx: Lovenox   - Post op abx: Ancef- 1 dose pending   - PT eval pending  - Weight bearing status: WBAT  - Pian medication for breakthrough pain/severe pain ordered  - Drop in H&H as expected will continue to monitor

## 2021-09-01 NOTE — PROGRESS NOTE ADULT - SUBJECTIVE AND OBJECTIVE BOX
SUBJECTIVE/24 hour events:  Patient is a 89yFemale presenting after unwitnessed fall sustaining a right intertrochanteric fracture, now pod#1 of right IMN. Patient with no acute events overnight, pain controlled on current regimen, VSS, RLE NVI, on IV hydration with regular diet likely can be iv locked after am labs result. Patient has PT/OT evaluation for dispo      Vital Signs Last 24 Hrs  T(C): 37.1 (31 Aug 2021 20:46), Max: 37.1 (31 Aug 2021 20:46)  T(F): 98.8 (31 Aug 2021 20:46), Max: 98.8 (31 Aug 2021 20:46)  HR: 69 (31 Aug 2021 20:46) (55 - 73)  BP: 179/69 (31 Aug 2021 20:46) (109/50 - 179/69)  BP(mean): 64 (31 Aug 2021 15:30) (64 - 80)  RR: 18 (31 Aug 2021 20:46) (14 - 18)  SpO2: 91% (31 Aug 2021 20:46) (91% - 100%)  Drug Dosing Weight  Height (cm): 304.8 (24 May 2021 14:51)  Weight (kg): 63.5 (31 Aug 2021 11:21)  BMI (kg/m2): 6.8 (31 Aug 2021 11:21)  BSA (m2): 2.65 (31 Aug 2021 11:21)  I&O's Detail    Allergies    No Known Allergies    Intolerances                              10.3   7.01  )-----------( 266      ( 31 Aug 2021 09:24 )             33.4   08-31    139  |  104  |  25.2<H>  ----------------------------<  137<H>  4.6   |  22.0  |  1.01    Ca    9.2      31 Aug 2021 09:24  Phos  3.9     08-31  Mg     2.0     08-31    TPro  6.9  /  Alb  3.8  /  TBili  0.2<L>  /  DBili  x   /  AST  32<H>  /  ALT  14  /  AlkPhos  103  08-30  PT/INR - ( 31 Aug 2021 09:24 )   PT: 13.4 sec;   INR: 1.16 ratio         PTT - ( 31 Aug 2021 09:24 )  PTT:27.8 sec    ROS:    PHYSICAL EXAM:  Constitutional: resting comfortably     Respiratory: no respiratory distress, no dyspnea, supplemental o2 via nasal canula 2 liters      Gastrointestinal: abdomen soft, obese, atraumatic     Genitourinary: voiding spontaneously     Extremities: rle edematous but compartments soft, nvi, warm to touch, wbat    Neurological: GCS 15    Skin: warm, dry and no rashes           MEDICATIONS  (STANDING):  acetaminophen   Tablet .. 650 milliGRAM(s) Oral every 6 hours  ATENolol  Tablet 25 milliGRAM(s) Oral daily  dextrose 40% Gel 15 Gram(s) Oral once  dextrose 5%. 1000 milliLiter(s) (50 mL/Hr) IV Continuous <Continuous>  dextrose 5%. 1000 milliLiter(s) (100 mL/Hr) IV Continuous <Continuous>  dextrose 50% Injectable 25 Gram(s) IV Push once  dextrose 50% Injectable 12.5 Gram(s) IV Push once  dextrose 50% Injectable 25 Gram(s) IV Push once  enoxaparin Injectable 40 milliGRAM(s) SubCutaneous daily  famotidine    Tablet 40 milliGRAM(s) Oral at bedtime  glucagon  Injectable 1 milliGRAM(s) IntraMuscular once  insulin lispro (ADMELOG) corrective regimen sliding scale   SubCutaneous three times a day before meals  lactated ringers. 1000 milliLiter(s) (125 mL/Hr) IV Continuous <Continuous>  lidocaine   4% Patch 1 Patch Transdermal daily  losartan 12.5 milliGRAM(s) Oral two times a day  pantoprazole    Tablet 40 milliGRAM(s) Oral before breakfast  senna 1 Tablet(s) Oral at bedtime  simvastatin 40 milliGRAM(s) Oral at bedtime  sodium chloride 0.9%. 1000 milliLiter(s) (75 mL/Hr) IV Continuous <Continuous>    MEDICATIONS  (PRN):  traMADol 25 milliGRAM(s) Oral every 6 hours PRN Moderate Pain (4 - 6)  traMADol 50 milliGRAM(s) Oral every 6 hours PRN Severe Pain (7 - 10)      RADIOLOGY STUDIES:    CULTURES:

## 2021-09-01 NOTE — OCCUPATIONAL THERAPY INITIAL EVALUATION ADULT - ADDITIONAL COMMENTS
pt PLOF unknown and pt is poor and unreliable historian despite use of language line  as per pt lives with daughter in house,

## 2021-09-01 NOTE — OCCUPATIONAL THERAPY INITIAL EVALUATION ADULT - LEVEL OF INDEPENDENCE: SIT/STAND, REHAB EVAL
attempted STS with two person, pt screaming upon attempt and refusing due to c/o pain despite max encouragement/unable to perform

## 2021-09-01 NOTE — PHYSICAL THERAPY INITIAL EVALUATION ADULT - ACTIVE RANGE OF MOTION EXAMINATION, REHAB EVAL
pt states she does not want to move LE's 2/2 pain/bilateral upper extremity Active ROM was WFL (within functional limits)/deficits as listed below

## 2021-09-01 NOTE — OCCUPATIONAL THERAPY INITIAL EVALUATION ADULT - SHORT TERM MEMORY, REHAB EVAL
further assess, cognitive vs language barrier, pt poor historian and unable to obtain PLOF and social hx

## 2021-09-01 NOTE — PHYSICAL THERAPY INITIAL EVALUATION ADULT - ADDITIONAL COMMENTS
EVELYN Barrett pt is a poor historian despite use of Janelle . States she lives in a house with her daughter and that there are 5 bedrooms upstairs. There is no Care coordination note at this time.

## 2021-09-01 NOTE — OCCUPATIONAL THERAPY INITIAL EVALUATION ADULT - PERTINENT HX OF CURRENT PROBLEM, REHAB EVAL
pt fell down stairs at home resulting in right IT fx, now s/p right hipIM nailing pt fell down stairs at home resulting in right IT fx, now s/p right hip IM nailing

## 2021-09-02 LAB
ANION GAP SERPL CALC-SCNC: 11 MMOL/L — SIGNIFICANT CHANGE UP (ref 5–17)
BUN SERPL-MCNC: 25.6 MG/DL — HIGH (ref 8–20)
CALCIUM SERPL-MCNC: 8.2 MG/DL — LOW (ref 8.6–10.2)
CHLORIDE SERPL-SCNC: 105 MMOL/L — SIGNIFICANT CHANGE UP (ref 98–107)
CO2 SERPL-SCNC: 22 MMOL/L — SIGNIFICANT CHANGE UP (ref 22–29)
CREAT SERPL-MCNC: 0.91 MG/DL — SIGNIFICANT CHANGE UP (ref 0.5–1.3)
GLUCOSE BLDC GLUCOMTR-MCNC: 129 MG/DL — HIGH (ref 70–99)
GLUCOSE BLDC GLUCOMTR-MCNC: 152 MG/DL — HIGH (ref 70–99)
GLUCOSE BLDC GLUCOMTR-MCNC: 158 MG/DL — HIGH (ref 70–99)
GLUCOSE BLDC GLUCOMTR-MCNC: 315 MG/DL — HIGH (ref 70–99)
GLUCOSE SERPL-MCNC: 136 MG/DL — HIGH (ref 70–99)
HCT VFR BLD CALC: 24 % — LOW (ref 34.5–45)
HGB BLD-MCNC: 7.6 G/DL — LOW (ref 11.5–15.5)
MAGNESIUM SERPL-MCNC: 2.4 MG/DL — SIGNIFICANT CHANGE UP (ref 1.8–2.6)
MCHC RBC-ENTMCNC: 30.3 PG — SIGNIFICANT CHANGE UP (ref 27–34)
MCHC RBC-ENTMCNC: 31.7 GM/DL — LOW (ref 32–36)
MCV RBC AUTO: 95.6 FL — SIGNIFICANT CHANGE UP (ref 80–100)
PLATELET # BLD AUTO: 193 K/UL — SIGNIFICANT CHANGE UP (ref 150–400)
POTASSIUM SERPL-MCNC: 4.7 MMOL/L — SIGNIFICANT CHANGE UP (ref 3.5–5.3)
POTASSIUM SERPL-SCNC: 4.7 MMOL/L — SIGNIFICANT CHANGE UP (ref 3.5–5.3)
RBC # BLD: 2.51 M/UL — LOW (ref 3.8–5.2)
RBC # FLD: 13.5 % — SIGNIFICANT CHANGE UP (ref 10.3–14.5)
SODIUM SERPL-SCNC: 138 MMOL/L — SIGNIFICANT CHANGE UP (ref 135–145)
WBC # BLD: 5.94 K/UL — SIGNIFICANT CHANGE UP (ref 3.8–10.5)
WBC # FLD AUTO: 5.94 K/UL — SIGNIFICANT CHANGE UP (ref 3.8–10.5)

## 2021-09-02 PROCEDURE — 99231 SBSQ HOSP IP/OBS SF/LOW 25: CPT

## 2021-09-02 RX ADMIN — Medication 650 MILLIGRAM(S): at 10:59

## 2021-09-02 RX ADMIN — SIMVASTATIN 40 MILLIGRAM(S): 20 TABLET, FILM COATED ORAL at 21:23

## 2021-09-02 RX ADMIN — LIDOCAINE 1 PATCH: 4 CREAM TOPICAL at 10:59

## 2021-09-02 RX ADMIN — Medication 650 MILLIGRAM(S): at 00:14

## 2021-09-02 RX ADMIN — ENOXAPARIN SODIUM 40 MILLIGRAM(S): 100 INJECTION SUBCUTANEOUS at 10:59

## 2021-09-02 RX ADMIN — FAMOTIDINE 40 MILLIGRAM(S): 10 INJECTION INTRAVENOUS at 21:23

## 2021-09-02 RX ADMIN — PANTOPRAZOLE SODIUM 40 MILLIGRAM(S): 20 TABLET, DELAYED RELEASE ORAL at 05:34

## 2021-09-02 RX ADMIN — Medication 4: at 12:21

## 2021-09-02 RX ADMIN — Medication 650 MILLIGRAM(S): at 06:15

## 2021-09-02 RX ADMIN — SENNA PLUS 1 TABLET(S): 8.6 TABLET ORAL at 21:23

## 2021-09-02 RX ADMIN — Medication 650 MILLIGRAM(S): at 11:51

## 2021-09-02 RX ADMIN — LIDOCAINE 1 PATCH: 4 CREAM TOPICAL at 19:56

## 2021-09-02 RX ADMIN — Medication 650 MILLIGRAM(S): at 05:34

## 2021-09-02 NOTE — PROGRESS NOTE ADULT - SUBJECTIVE AND OBJECTIVE BOX
Ortho Post Op Check    Name: KADY SUAREZ    MR #: [id number]    Procedure: right hip IMN  Surgeon: Haylee    Pt comfortable without complaints, pain controlled  Denies CP, SOB, N/V, numbness/tingling     General Exam:  Vital Signs Last 24 Hrs  T(C): 37.2 (09-02-21 @ 05:23), Max: 37.2 (09-02-21 @ 05:23)  T(F): 98.9 (09-02-21 @ 05:23), Max: 98.9 (09-02-21 @ 05:23)  HR: 74 (09-02-21 @ 05:23) (74 - 74)  BP: 105/65 (09-02-21 @ 05:23) (105/65 - 105/65)  BP(mean): --  RR: 18 (09-02-21 @ 05:23) (18 - 18)  SpO2: 94% (09-02-21 @ 05:23) (94% - 94%)    General: Pt Alert and oriented, NAD, controlled pain.  Dressings C/D/I. No bleeding. dressings removed, incisions c/d/i, no bleeding or drainage. no wound erythema, new dry dressing placed  Pulses: 2+ dorsalis pedis pulse. Cap refill < 2 sec.  Sensation: Grossly intact to light touch without deficit.  Motor: + EHL/FHL/TA/GS    Post-op X-Ray:    A/P: 89yFemale POD#2 s/p right hip IMN  - Stable  - Pain Control  - DVT ppx  - WBAT

## 2021-09-02 NOTE — PROGRESS NOTE ADULT - SUBJECTIVE AND OBJECTIVE BOX
SUBJECTIVE/24 hour events:    Patient is a 89yFemale presenting after unwitnessed fall sustaining a right intertrochanteric fracture, now pod#2 of right IMN. Patient with no acute events overnight, pain controlled on current regimen, VSS, RLE NVI, now IV locked with regular diet, hemoglobin stable. Patient was seen by PT/OT and deemed appropriate for placement in [facility]           Vital Signs Last 24 Hrs  T(C): 36.9 (01 Sep 2021 21:24), Max: 38.2 (01 Sep 2021 17:05)  T(F): 98.4 (01 Sep 2021 21:24), Max: 100.8 (01 Sep 2021 17:05)  HR: 69 (01 Sep 2021 21:24) (61 - 99)  BP: 99/52 (01 Sep 2021 21:24) (92/55 - 163/84)  BP(mean): --  RR: 18 (01 Sep 2021 21:24) (18 - 18)  SpO2: 92% (01 Sep 2021 21:24) (92% - 100%)  Drug Dosing Weight  Height (cm): 304.8 (24 May 2021 14:51)  Weight (kg): 63.5 (31 Aug 2021 11:21)  BMI (kg/m2): 6.8 (31 Aug 2021 11:21)  BSA (m2): 2.65 (31 Aug 2021 11:21)  I&O's Detail    31 Aug 2021 07:01  -  01 Sep 2021 07:00  --------------------------------------------------------  IN:    Lactated Ringers: 150 mL    sodium chloride 0.9%: 600 mL  Total IN: 750 mL    OUT:    Voided (mL): 1100 mL  Total OUT: 1100 mL    Total NET: -350 mL      01 Sep 2021 07:01  -  02 Sep 2021 03:29  --------------------------------------------------------  IN:  Total IN: 0 mL    OUT:    Voided (mL): 400 mL  Total OUT: 400 mL    Total NET: -400 mL        Allergies    No Known Allergies    Intolerances                              8.4    6.59  )-----------( 218      ( 01 Sep 2021 07:49 )             27.3   09-01    136  |  102  |  20.2<H>  ----------------------------<  162<H>  4.4   |  26.0  |  0.83    Ca    8.4<L>      01 Sep 2021 07:49  Phos  3.4     09-01  Mg     1.9     09-01    PT/INR - ( 31 Aug 2021 09:24 )   PT: 13.4 sec;   INR: 1.16 ratio         PTT - ( 31 Aug 2021 09:24 )  PTT:27.8 sec    ROS:    PHYSICAL EXAM:    Constitutional: resting comfortably     Respiratory: no respiratory distress, no dyspnea, supplemental o2 via nasal canula 2 liters      Gastrointestinal: abdomen soft, obese, atraumatic     Genitourinary: voiding spontaneously     Extremities: rle edematous but compartments soft, nvi, warm to touch, wbat    Neurological: GCS 15    Skin: warm, dry and no rashes     MEDICATIONS  (STANDING):  acetaminophen   Tablet .. 650 milliGRAM(s) Oral every 6 hours  ATENolol  Tablet 25 milliGRAM(s) Oral daily  dextrose 40% Gel 15 Gram(s) Oral once  dextrose 5%. 1000 milliLiter(s) (50 mL/Hr) IV Continuous <Continuous>  dextrose 5%. 1000 milliLiter(s) (100 mL/Hr) IV Continuous <Continuous>  dextrose 50% Injectable 25 Gram(s) IV Push once  dextrose 50% Injectable 12.5 Gram(s) IV Push once  dextrose 50% Injectable 25 Gram(s) IV Push once  enoxaparin Injectable 40 milliGRAM(s) SubCutaneous daily  famotidine    Tablet 40 milliGRAM(s) Oral at bedtime  glucagon  Injectable 1 milliGRAM(s) IntraMuscular once  insulin lispro (ADMELOG) corrective regimen sliding scale   SubCutaneous three times a day before meals  lidocaine   4% Patch 1 Patch Transdermal daily  losartan 12.5 milliGRAM(s) Oral two times a day  pantoprazole    Tablet 40 milliGRAM(s) Oral before breakfast  senna 1 Tablet(s) Oral at bedtime  simvastatin 40 milliGRAM(s) Oral at bedtime  sodium chloride 0.9%. 1000 milliLiter(s) (75 mL/Hr) IV Continuous <Continuous>    MEDICATIONS  (PRN):  morphine  - Injectable 2 milliGRAM(s) IV Push every 4 hours PRN Severe Pain (7 - 10)/breakthrough pain  traMADol 25 milliGRAM(s) Oral every 6 hours PRN Moderate Pain (4 - 6)  traMADol 50 milliGRAM(s) Oral every 6 hours PRN Severe Pain (7 - 10)      RADIOLOGY STUDIES:    CULTURES:         SUBJECTIVE/24 hour events:    Patient is a 89yFemale presenting after unwitnessed fall sustaining a right intertrochanteric fracture, now pod#2 of right IMN. Patient with no acute events overnight, pain controlled on current regimen, VSS, RLE NVI, now IV locked with regular diet, hemoglobin dropped from 10 to 8 but asymptomatic will f/u am cbc.  Patient was seen by PT/OT and deemed appropriate for placement in [facility]           Vital Signs Last 24 Hrs  T(C): 36.9 (01 Sep 2021 21:24), Max: 38.2 (01 Sep 2021 17:05)  T(F): 98.4 (01 Sep 2021 21:24), Max: 100.8 (01 Sep 2021 17:05)  HR: 69 (01 Sep 2021 21:24) (61 - 99)  BP: 99/52 (01 Sep 2021 21:24) (92/55 - 163/84)  BP(mean): --  RR: 18 (01 Sep 2021 21:24) (18 - 18)  SpO2: 92% (01 Sep 2021 21:24) (92% - 100%)  Drug Dosing Weight  Height (cm): 304.8 (24 May 2021 14:51)  Weight (kg): 63.5 (31 Aug 2021 11:21)  BMI (kg/m2): 6.8 (31 Aug 2021 11:21)  BSA (m2): 2.65 (31 Aug 2021 11:21)  I&O's Detail    31 Aug 2021 07:01  -  01 Sep 2021 07:00  --------------------------------------------------------  IN:    Lactated Ringers: 150 mL    sodium chloride 0.9%: 600 mL  Total IN: 750 mL    OUT:    Voided (mL): 1100 mL  Total OUT: 1100 mL    Total NET: -350 mL      01 Sep 2021 07:01  -  02 Sep 2021 03:29  --------------------------------------------------------  IN:  Total IN: 0 mL    OUT:    Voided (mL): 400 mL  Total OUT: 400 mL    Total NET: -400 mL        Allergies    No Known Allergies    Intolerances                              8.4    6.59  )-----------( 218      ( 01 Sep 2021 07:49 )             27.3   09-01    136  |  102  |  20.2<H>  ----------------------------<  162<H>  4.4   |  26.0  |  0.83    Ca    8.4<L>      01 Sep 2021 07:49  Phos  3.4     09-01  Mg     1.9     09-01    PT/INR - ( 31 Aug 2021 09:24 )   PT: 13.4 sec;   INR: 1.16 ratio         PTT - ( 31 Aug 2021 09:24 )  PTT:27.8 sec    ROS:    PHYSICAL EXAM:    Constitutional: resting comfortably     Respiratory: no respiratory distress, no dyspnea, supplemental o2 via nasal canula 2 liters      Gastrointestinal: abdomen soft, obese, atraumatic     Genitourinary: voiding spontaneously     Extremities: rle edematous but compartments soft, nvi, warm to touch, wbat    Neurological: GCS 15    Skin: warm, dry and no rashes     MEDICATIONS  (STANDING):  acetaminophen   Tablet .. 650 milliGRAM(s) Oral every 6 hours  ATENolol  Tablet 25 milliGRAM(s) Oral daily  dextrose 40% Gel 15 Gram(s) Oral once  dextrose 5%. 1000 milliLiter(s) (50 mL/Hr) IV Continuous <Continuous>  dextrose 5%. 1000 milliLiter(s) (100 mL/Hr) IV Continuous <Continuous>  dextrose 50% Injectable 25 Gram(s) IV Push once  dextrose 50% Injectable 12.5 Gram(s) IV Push once  dextrose 50% Injectable 25 Gram(s) IV Push once  enoxaparin Injectable 40 milliGRAM(s) SubCutaneous daily  famotidine    Tablet 40 milliGRAM(s) Oral at bedtime  glucagon  Injectable 1 milliGRAM(s) IntraMuscular once  insulin lispro (ADMELOG) corrective regimen sliding scale   SubCutaneous three times a day before meals  lidocaine   4% Patch 1 Patch Transdermal daily  losartan 12.5 milliGRAM(s) Oral two times a day  pantoprazole    Tablet 40 milliGRAM(s) Oral before breakfast  senna 1 Tablet(s) Oral at bedtime  simvastatin 40 milliGRAM(s) Oral at bedtime  sodium chloride 0.9%. 1000 milliLiter(s) (75 mL/Hr) IV Continuous <Continuous>    MEDICATIONS  (PRN):  morphine  - Injectable 2 milliGRAM(s) IV Push every 4 hours PRN Severe Pain (7 - 10)/breakthrough pain  traMADol 25 milliGRAM(s) Oral every 6 hours PRN Moderate Pain (4 - 6)  traMADol 50 milliGRAM(s) Oral every 6 hours PRN Severe Pain (7 - 10)      RADIOLOGY STUDIES:    CULTURES:

## 2021-09-03 LAB
ANION GAP SERPL CALC-SCNC: 9 MMOL/L — SIGNIFICANT CHANGE UP (ref 5–17)
BUN SERPL-MCNC: 27.5 MG/DL — HIGH (ref 8–20)
CALCIUM SERPL-MCNC: 8.3 MG/DL — LOW (ref 8.6–10.2)
CHLORIDE SERPL-SCNC: 103 MMOL/L — SIGNIFICANT CHANGE UP (ref 98–107)
CO2 SERPL-SCNC: 25 MMOL/L — SIGNIFICANT CHANGE UP (ref 22–29)
CREAT SERPL-MCNC: 0.87 MG/DL — SIGNIFICANT CHANGE UP (ref 0.5–1.3)
GLUCOSE BLDC GLUCOMTR-MCNC: 133 MG/DL — HIGH (ref 70–99)
GLUCOSE BLDC GLUCOMTR-MCNC: 136 MG/DL — HIGH (ref 70–99)
GLUCOSE BLDC GLUCOMTR-MCNC: 138 MG/DL — HIGH (ref 70–99)
GLUCOSE BLDC GLUCOMTR-MCNC: 143 MG/DL — HIGH (ref 70–99)
GLUCOSE SERPL-MCNC: 138 MG/DL — HIGH (ref 70–99)
HCT VFR BLD CALC: 23.8 % — LOW (ref 34.5–45)
HGB BLD-MCNC: 7.5 G/DL — LOW (ref 11.5–15.5)
MAGNESIUM SERPL-MCNC: 2.2 MG/DL — SIGNIFICANT CHANGE UP (ref 1.6–2.6)
MCHC RBC-ENTMCNC: 30 PG — SIGNIFICANT CHANGE UP (ref 27–34)
MCHC RBC-ENTMCNC: 31.5 GM/DL — LOW (ref 32–36)
MCV RBC AUTO: 95.2 FL — SIGNIFICANT CHANGE UP (ref 80–100)
PHOSPHATE SERPL-MCNC: 2.2 MG/DL — LOW (ref 2.4–4.7)
PLATELET # BLD AUTO: 212 K/UL — SIGNIFICANT CHANGE UP (ref 150–400)
POTASSIUM SERPL-MCNC: 4.1 MMOL/L — SIGNIFICANT CHANGE UP (ref 3.5–5.3)
POTASSIUM SERPL-SCNC: 4.1 MMOL/L — SIGNIFICANT CHANGE UP (ref 3.5–5.3)
RBC # BLD: 2.5 M/UL — LOW (ref 3.8–5.2)
RBC # FLD: 13.5 % — SIGNIFICANT CHANGE UP (ref 10.3–14.5)
SARS-COV-2 RNA SPEC QL NAA+PROBE: SIGNIFICANT CHANGE UP
SODIUM SERPL-SCNC: 137 MMOL/L — SIGNIFICANT CHANGE UP (ref 135–145)
WBC # BLD: 6.57 K/UL — SIGNIFICANT CHANGE UP (ref 3.8–10.5)
WBC # FLD AUTO: 6.57 K/UL — SIGNIFICANT CHANGE UP (ref 3.8–10.5)

## 2021-09-03 PROCEDURE — 99024 POSTOP FOLLOW-UP VISIT: CPT

## 2021-09-03 RX ORDER — POLYETHYLENE GLYCOL 3350 17 G/17G
17 POWDER, FOR SOLUTION ORAL ONCE
Refills: 0 | Status: COMPLETED | OUTPATIENT
Start: 2021-09-03 | End: 2021-09-03

## 2021-09-03 RX ADMIN — LOSARTAN POTASSIUM 12.5 MILLIGRAM(S): 100 TABLET, FILM COATED ORAL at 05:03

## 2021-09-03 RX ADMIN — SIMVASTATIN 40 MILLIGRAM(S): 20 TABLET, FILM COATED ORAL at 21:34

## 2021-09-03 RX ADMIN — Medication 650 MILLIGRAM(S): at 18:10

## 2021-09-03 RX ADMIN — LIDOCAINE 1 PATCH: 4 CREAM TOPICAL at 11:17

## 2021-09-03 RX ADMIN — Medication 650 MILLIGRAM(S): at 11:16

## 2021-09-03 RX ADMIN — Medication 650 MILLIGRAM(S): at 05:48

## 2021-09-03 RX ADMIN — Medication 650 MILLIGRAM(S): at 01:00

## 2021-09-03 RX ADMIN — PANTOPRAZOLE SODIUM 40 MILLIGRAM(S): 20 TABLET, DELAYED RELEASE ORAL at 05:03

## 2021-09-03 RX ADMIN — LIDOCAINE 1 PATCH: 4 CREAM TOPICAL at 00:07

## 2021-09-03 RX ADMIN — SENNA PLUS 1 TABLET(S): 8.6 TABLET ORAL at 21:34

## 2021-09-03 RX ADMIN — ENOXAPARIN SODIUM 40 MILLIGRAM(S): 100 INJECTION SUBCUTANEOUS at 11:16

## 2021-09-03 RX ADMIN — LIDOCAINE 1 PATCH: 4 CREAM TOPICAL at 19:10

## 2021-09-03 RX ADMIN — Medication 650 MILLIGRAM(S): at 23:54

## 2021-09-03 RX ADMIN — Medication 650 MILLIGRAM(S): at 05:03

## 2021-09-03 RX ADMIN — Medication 650 MILLIGRAM(S): at 00:25

## 2021-09-03 RX ADMIN — Medication 650 MILLIGRAM(S): at 17:11

## 2021-09-03 RX ADMIN — LIDOCAINE 1 PATCH: 4 CREAM TOPICAL at 23:54

## 2021-09-03 RX ADMIN — FAMOTIDINE 40 MILLIGRAM(S): 10 INJECTION INTRAVENOUS at 21:34

## 2021-09-03 RX ADMIN — ATENOLOL 25 MILLIGRAM(S): 25 TABLET ORAL at 05:03

## 2021-09-03 RX ADMIN — POLYETHYLENE GLYCOL 3350 17 GRAM(S): 17 POWDER, FOR SOLUTION ORAL at 23:54

## 2021-09-03 RX ADMIN — Medication 650 MILLIGRAM(S): at 12:16

## 2021-09-03 NOTE — PROGRESS NOTE ADULT - SUBJECTIVE AND OBJECTIVE BOX
ACS Surgery Progress Note     Subjective/24hour Events: No acute events overnight. Pain controlled on current regimen, VSS. Regular diet, hemoglobin stable to 7.5 this morning  but asymptomatic.  Patient was seen by PT/OT and deemed appropriate for placement in [facility]       MEDICATIONS  (STANDING):  acetaminophen   Tablet .. 650 milliGRAM(s) Oral every 6 hours  ATENolol  Tablet 25 milliGRAM(s) Oral daily  dextrose 40% Gel 15 Gram(s) Oral once  dextrose 5%. 1000 milliLiter(s) (50 mL/Hr) IV Continuous <Continuous>  dextrose 5%. 1000 milliLiter(s) (100 mL/Hr) IV Continuous <Continuous>  dextrose 50% Injectable 25 Gram(s) IV Push once  dextrose 50% Injectable 12.5 Gram(s) IV Push once  dextrose 50% Injectable 25 Gram(s) IV Push once  enoxaparin Injectable 40 milliGRAM(s) SubCutaneous daily  famotidine    Tablet 40 milliGRAM(s) Oral at bedtime  glucagon  Injectable 1 milliGRAM(s) IntraMuscular once  insulin lispro (ADMELOG) corrective regimen sliding scale   SubCutaneous three times a day before meals  lidocaine   4% Patch 1 Patch Transdermal daily  losartan 12.5 milliGRAM(s) Oral two times a day  pantoprazole    Tablet 40 milliGRAM(s) Oral before breakfast  senna 1 Tablet(s) Oral at bedtime  simvastatin 40 milliGRAM(s) Oral at bedtime    MEDICATIONS  (PRN):  morphine  - Injectable 2 milliGRAM(s) IV Push every 4 hours PRN Severe Pain (7 - 10)/breakthrough pain  traMADol 25 milliGRAM(s) Oral every 6 hours PRN Moderate Pain (4 - 6)  traMADol 50 milliGRAM(s) Oral every 6 hours PRN Severe Pain (7 - 10)      Vital Signs:  Vital Signs Last 24 Hrs  T(C): 36.9 (03 Sep 2021 04:09), Max: 37.4 (02 Sep 2021 16:32)  T(F): 98.5 (03 Sep 2021 04:09), Max: 99.3 (02 Sep 2021 16:32)  HR: 71 (03 Sep 2021 04:09) (70 - 89)  BP: 122/72 (03 Sep 2021 04:09) (93/50 - 122/72)  BP(mean): --  RR: 18 (03 Sep 2021 04:09) (18 - 18)  SpO2: 93% (03 Sep 2021 04:09) (92% - 95%)    CAPILLARY BLOOD GLUCOSE      POCT Blood Glucose.: 158 mg/dL (02 Sep 2021 21:29)  POCT Blood Glucose.: 152 mg/dL (02 Sep 2021 19:11)  POCT Blood Glucose.: 315 mg/dL (02 Sep 2021 12:07)  POCT Blood Glucose.: 129 mg/dL (02 Sep 2021 08:52)      I&O's Detail    02 Sep 2021 07:01  -  03 Sep 2021 07:00  --------------------------------------------------------  IN:  Total IN: 0 mL    OUT:    Voided (mL): 1000 mL  Total OUT: 1000 mL    Total NET: -1000 mL          Physical Exam:  General: NAD, Pleasant  Neurology: Patient is AA&Ox4, follows commands, and speech fluent. EOMI intact, PERRLA, 3mm--2mm. Sensation in the Trigeminal distribution is wnl and symmetrical. Facial muscles intact and symmetrical. Hearing appropriate. Uvula rises equally upon phonation and tongue protrudes symmetrically. SCM/Trapezius 5/5 power.  Neck: Neck supple, trachea midline, No JVD  Respiratory: CTA B/L, (-)rales, rhonchi  CV: S1S2, r/r/r, (-)m/r/g  Abdomen: S/NT/ND, BSx4  Extremities: rle edematous but compartments soft, nvi, warm to touch, wbat  Skin: No Rashes, Hematoma, Ecchymosis    Labs:    09-03    137  |  103  |  27.5<H>  ----------------------------<  138<H>  4.1   |  25.0  |  0.87    Ca    8.3<L>      03 Sep 2021 06:01  Phos  2.2     09-03  Mg     2.2     09-03                              7.5    6.57  )-----------( 212      ( 03 Sep 2021 06:01 )             23.8         Imaging:

## 2021-09-03 NOTE — PROGRESS NOTE ADULT - SUBJECTIVE AND OBJECTIVE BOX
Ortho Progress note    Name: KADY SUAREZ    MR #: [id number]    Procedure: right hip IMN  Surgeon: Haylee    Pt seen resting comfortably in bed in NAD  Denies CP, SOB, N/V, numbness/tingling     General Exam:  Vital Signs Last 24 Hrs  T(C): 36.9 (03 Sep 2021 04:09), Max: 37.4 (02 Sep 2021 16:32)  T(F): 98.5 (03 Sep 2021 04:09), Max: 99.3 (02 Sep 2021 16:32)  HR: 71 (03 Sep 2021 04:09) (70 - 89)  BP: 122/72 (03 Sep 2021 04:09) (93/50 - 122/72)  BP(mean): --  RR: 18 (03 Sep 2021 04:09) (18 - 18)  SpO2: 93% (03 Sep 2021 04:09) (92% - 95%)                        7.5    6.57  )-----------( 212      ( 03 Sep 2021 06:01 )             23.8     General: Pt Alert and oriented, NAD, controlled pain.  Right hip dressings C/D/I.   Pulses: 2+ dorsalis pedis pulse. Cap refill < 2 sec.  Sensation: Grossly intact to light touch without deficit.  Motor: + EHL/FHL/TA/GS    A/P: 89yFemale POD#3 s/p right hip IMN    - Pain Control as clinically indicated  - DVT ppx as prescribed with use of compression devices  - Continue PT, WBAT  - Continue care per primary team  - DC planning

## 2021-09-04 LAB
ANION GAP SERPL CALC-SCNC: 9 MMOL/L — SIGNIFICANT CHANGE UP (ref 5–17)
BUN SERPL-MCNC: 31.4 MG/DL — HIGH (ref 8–20)
CALCIUM SERPL-MCNC: 8.5 MG/DL — LOW (ref 8.6–10.2)
CHLORIDE SERPL-SCNC: 105 MMOL/L — SIGNIFICANT CHANGE UP (ref 98–107)
CO2 SERPL-SCNC: 25 MMOL/L — SIGNIFICANT CHANGE UP (ref 22–29)
CREAT SERPL-MCNC: 0.87 MG/DL — SIGNIFICANT CHANGE UP (ref 0.5–1.3)
GLUCOSE BLDC GLUCOMTR-MCNC: 123 MG/DL — HIGH (ref 70–99)
GLUCOSE BLDC GLUCOMTR-MCNC: 129 MG/DL — HIGH (ref 70–99)
GLUCOSE BLDC GLUCOMTR-MCNC: 140 MG/DL — HIGH (ref 70–99)
GLUCOSE SERPL-MCNC: 135 MG/DL — HIGH (ref 70–99)
MAGNESIUM SERPL-MCNC: 2.1 MG/DL — SIGNIFICANT CHANGE UP (ref 1.6–2.6)
PHOSPHATE SERPL-MCNC: 2.5 MG/DL — SIGNIFICANT CHANGE UP (ref 2.4–4.7)
POTASSIUM SERPL-MCNC: 4.2 MMOL/L — SIGNIFICANT CHANGE UP (ref 3.5–5.3)
POTASSIUM SERPL-SCNC: 4.2 MMOL/L — SIGNIFICANT CHANGE UP (ref 3.5–5.3)
SODIUM SERPL-SCNC: 139 MMOL/L — SIGNIFICANT CHANGE UP (ref 135–145)

## 2021-09-04 PROCEDURE — 99231 SBSQ HOSP IP/OBS SF/LOW 25: CPT | Mod: GC

## 2021-09-04 RX ORDER — SODIUM,POTASSIUM PHOSPHATES 278-250MG
1 POWDER IN PACKET (EA) ORAL ONCE
Refills: 0 | Status: COMPLETED | OUTPATIENT
Start: 2021-09-04 | End: 2021-09-04

## 2021-09-04 RX ADMIN — Medication 650 MILLIGRAM(S): at 17:11

## 2021-09-04 RX ADMIN — Medication 650 MILLIGRAM(S): at 23:07

## 2021-09-04 RX ADMIN — SENNA PLUS 1 TABLET(S): 8.6 TABLET ORAL at 20:33

## 2021-09-04 RX ADMIN — SIMVASTATIN 40 MILLIGRAM(S): 20 TABLET, FILM COATED ORAL at 20:33

## 2021-09-04 RX ADMIN — LIDOCAINE 1 PATCH: 4 CREAM TOPICAL at 19:11

## 2021-09-04 RX ADMIN — FAMOTIDINE 40 MILLIGRAM(S): 10 INJECTION INTRAVENOUS at 20:33

## 2021-09-04 RX ADMIN — PANTOPRAZOLE SODIUM 40 MILLIGRAM(S): 20 TABLET, DELAYED RELEASE ORAL at 05:34

## 2021-09-04 RX ADMIN — Medication 1 PACKET(S): at 11:22

## 2021-09-04 RX ADMIN — LIDOCAINE 1 PATCH: 4 CREAM TOPICAL at 23:08

## 2021-09-04 RX ADMIN — LOSARTAN POTASSIUM 12.5 MILLIGRAM(S): 100 TABLET, FILM COATED ORAL at 05:34

## 2021-09-04 RX ADMIN — ATENOLOL 25 MILLIGRAM(S): 25 TABLET ORAL at 05:34

## 2021-09-04 RX ADMIN — Medication 650 MILLIGRAM(S): at 05:34

## 2021-09-04 RX ADMIN — Medication 650 MILLIGRAM(S): at 11:52

## 2021-09-04 RX ADMIN — ENOXAPARIN SODIUM 40 MILLIGRAM(S): 100 INJECTION SUBCUTANEOUS at 11:22

## 2021-09-04 RX ADMIN — Medication 650 MILLIGRAM(S): at 06:20

## 2021-09-04 RX ADMIN — LIDOCAINE 1 PATCH: 4 CREAM TOPICAL at 11:22

## 2021-09-04 RX ADMIN — Medication 650 MILLIGRAM(S): at 00:30

## 2021-09-04 RX ADMIN — TRAMADOL HYDROCHLORIDE 50 MILLIGRAM(S): 50 TABLET ORAL at 11:52

## 2021-09-04 RX ADMIN — TRAMADOL HYDROCHLORIDE 50 MILLIGRAM(S): 50 TABLET ORAL at 11:22

## 2021-09-04 RX ADMIN — LOSARTAN POTASSIUM 12.5 MILLIGRAM(S): 100 TABLET, FILM COATED ORAL at 17:11

## 2021-09-04 RX ADMIN — Medication 650 MILLIGRAM(S): at 11:22

## 2021-09-04 NOTE — PROGRESS NOTE ADULT - SUBJECTIVE AND OBJECTIVE BOX
HPI/OVERNIGHT EVENTS: Patient seen and examined at bedside this AM. stable, pending DC, oending familly deicision for facility    Vital Signs Last 24 Hrs  T(C): 36.7 (03 Sep 2021 21:31), Max: 36.9 (03 Sep 2021 08:07)  T(F): 98.1 (03 Sep 2021 21:31), Max: 98.4 (03 Sep 2021 08:07)  HR: 70 (03 Sep 2021 21:31) (62 - 70)  BP: 117/68 (03 Sep 2021 21:31) (103/59 - 117/71)  BP(mean): --  RR: 18 (03 Sep 2021 21:31) (18 - 18)  SpO2: 94% (03 Sep 2021 21:31) (92% - 98%)    I&O's Detail    02 Sep 2021 07:01  -  03 Sep 2021 07:00  --------------------------------------------------------  IN:  Total IN: 0 mL    OUT:    Voided (mL): 1000 mL  Total OUT: 1000 mL    Total NET: -1000 mL      03 Sep 2021 07:01  -  04 Sep 2021 04:17  --------------------------------------------------------  IN:  Total IN: 0 mL    OUT:    Voided (mL): 500 mL  Total OUT: 500 mL    Total NET: -500 mL      Physical Exam:  General: NAD, Pleasant  Neurology: Patient is AA&Ox4, follows commands, and speech fluent. EOMI intact, PERRLA, 3mm--2mm. Sensation in the Trigeminal distribution is wnl and symmetrical. Facial muscles intact and symmetrical. Hearing appropriate. Uvula rises equally upon phonation and tongue protrudes symmetrically. SCM/Trapezius 5/5 power.  Neck: Neck supple, trachea midline, No JVD  Respiratory: CTA B/L, (-)rales, rhonchi  CV: S1S2, r/r/r, (-)m/r/g  Abdomen: S/NT/ND, BSx4  Extremities: rle edematous but compartments soft, nvi, warm to touch, wbat  Skin: No Rashes, Hematoma, Ecchymosis    LABS:                        7.5    6.57  )-----------( 212      ( 03 Sep 2021 06:01 )             23.8     09-03    137  |  103  |  27.5<H>  ----------------------------<  138<H>  4.1   |  25.0  |  0.87    Ca    8.3<L>      03 Sep 2021 06:01  Phos  2.2     09-03  Mg     2.2     09-03            MEDICATIONS  (STANDING):  acetaminophen   Tablet .. 650 milliGRAM(s) Oral every 6 hours  ATENolol  Tablet 25 milliGRAM(s) Oral daily  dextrose 40% Gel 15 Gram(s) Oral once  dextrose 5%. 1000 milliLiter(s) (50 mL/Hr) IV Continuous <Continuous>  dextrose 5%. 1000 milliLiter(s) (100 mL/Hr) IV Continuous <Continuous>  dextrose 50% Injectable 25 Gram(s) IV Push once  dextrose 50% Injectable 12.5 Gram(s) IV Push once  dextrose 50% Injectable 25 Gram(s) IV Push once  enoxaparin Injectable 40 milliGRAM(s) SubCutaneous daily  famotidine    Tablet 40 milliGRAM(s) Oral at bedtime  glucagon  Injectable 1 milliGRAM(s) IntraMuscular once  insulin lispro (ADMELOG) corrective regimen sliding scale   SubCutaneous three times a day before meals  lidocaine   4% Patch 1 Patch Transdermal daily  losartan 12.5 milliGRAM(s) Oral two times a day  pantoprazole    Tablet 40 milliGRAM(s) Oral before breakfast  senna 1 Tablet(s) Oral at bedtime  simvastatin 40 milliGRAM(s) Oral at bedtime    MEDICATIONS  (PRN):  morphine  - Injectable 2 milliGRAM(s) IV Push every 4 hours PRN Severe Pain (7 - 10)/breakthrough pain  traMADol 25 milliGRAM(s) Oral every 6 hours PRN Moderate Pain (4 - 6)  traMADol 50 milliGRAM(s) Oral every 6 hours PRN Severe Pain (7 - 10)      MICRO:   Cultures     STUDIES:   EKG, CXR, U/S, CT, MRI

## 2021-09-05 LAB
GLUCOSE BLDC GLUCOMTR-MCNC: 116 MG/DL — HIGH (ref 70–99)
GLUCOSE BLDC GLUCOMTR-MCNC: 118 MG/DL — HIGH (ref 70–99)
GLUCOSE BLDC GLUCOMTR-MCNC: 135 MG/DL — HIGH (ref 70–99)
GLUCOSE BLDC GLUCOMTR-MCNC: 139 MG/DL — HIGH (ref 70–99)
GLUCOSE BLDC GLUCOMTR-MCNC: 168 MG/DL — HIGH (ref 70–99)

## 2021-09-05 PROCEDURE — 99231 SBSQ HOSP IP/OBS SF/LOW 25: CPT | Mod: GC

## 2021-09-05 RX ADMIN — Medication 10 MILLIGRAM(S): at 17:09

## 2021-09-05 RX ADMIN — TRAMADOL HYDROCHLORIDE 25 MILLIGRAM(S): 50 TABLET ORAL at 21:32

## 2021-09-05 RX ADMIN — Medication 650 MILLIGRAM(S): at 05:12

## 2021-09-05 RX ADMIN — Medication 650 MILLIGRAM(S): at 17:09

## 2021-09-05 RX ADMIN — ENOXAPARIN SODIUM 40 MILLIGRAM(S): 100 INJECTION SUBCUTANEOUS at 11:42

## 2021-09-05 RX ADMIN — Medication 650 MILLIGRAM(S): at 00:00

## 2021-09-05 RX ADMIN — LIDOCAINE 1 PATCH: 4 CREAM TOPICAL at 19:00

## 2021-09-05 RX ADMIN — LIDOCAINE 1 PATCH: 4 CREAM TOPICAL at 11:42

## 2021-09-05 RX ADMIN — ATENOLOL 25 MILLIGRAM(S): 25 TABLET ORAL at 05:12

## 2021-09-05 RX ADMIN — TRAMADOL HYDROCHLORIDE 25 MILLIGRAM(S): 50 TABLET ORAL at 22:32

## 2021-09-05 RX ADMIN — Medication 650 MILLIGRAM(S): at 18:05

## 2021-09-05 RX ADMIN — FAMOTIDINE 40 MILLIGRAM(S): 10 INJECTION INTRAVENOUS at 21:29

## 2021-09-05 RX ADMIN — LIDOCAINE 1 PATCH: 4 CREAM TOPICAL at 22:57

## 2021-09-05 RX ADMIN — SENNA PLUS 1 TABLET(S): 8.6 TABLET ORAL at 21:28

## 2021-09-05 RX ADMIN — LOSARTAN POTASSIUM 12.5 MILLIGRAM(S): 100 TABLET, FILM COATED ORAL at 05:11

## 2021-09-05 RX ADMIN — SIMVASTATIN 40 MILLIGRAM(S): 20 TABLET, FILM COATED ORAL at 21:28

## 2021-09-05 RX ADMIN — Medication 650 MILLIGRAM(S): at 06:06

## 2021-09-05 RX ADMIN — Medication 650 MILLIGRAM(S): at 11:42

## 2021-09-05 RX ADMIN — PANTOPRAZOLE SODIUM 40 MILLIGRAM(S): 20 TABLET, DELAYED RELEASE ORAL at 05:12

## 2021-09-05 RX ADMIN — Medication 650 MILLIGRAM(S): at 12:40

## 2021-09-05 RX ADMIN — LOSARTAN POTASSIUM 12.5 MILLIGRAM(S): 100 TABLET, FILM COATED ORAL at 17:08

## 2021-09-05 NOTE — PROGRESS NOTE ADULT - SUBJECTIVE AND OBJECTIVE BOX
INTERVAL HPI/OVERNIGHT EVENTS:    No acute events overnight.     MEDICATIONS  (STANDING):  acetaminophen   Tablet .. 650 milliGRAM(s) Oral every 6 hours  ATENolol  Tablet 25 milliGRAM(s) Oral daily  dextrose 40% Gel 15 Gram(s) Oral once  dextrose 5%. 1000 milliLiter(s) (50 mL/Hr) IV Continuous <Continuous>  dextrose 5%. 1000 milliLiter(s) (100 mL/Hr) IV Continuous <Continuous>  dextrose 50% Injectable 25 Gram(s) IV Push once  dextrose 50% Injectable 12.5 Gram(s) IV Push once  dextrose 50% Injectable 25 Gram(s) IV Push once  enoxaparin Injectable 40 milliGRAM(s) SubCutaneous daily  famotidine    Tablet 40 milliGRAM(s) Oral at bedtime  glucagon  Injectable 1 milliGRAM(s) IntraMuscular once  insulin lispro (ADMELOG) corrective regimen sliding scale   SubCutaneous three times a day before meals  lidocaine   4% Patch 1 Patch Transdermal daily  losartan 12.5 milliGRAM(s) Oral two times a day  pantoprazole    Tablet 40 milliGRAM(s) Oral before breakfast  senna 1 Tablet(s) Oral at bedtime  simvastatin 40 milliGRAM(s) Oral at bedtime    MEDICATIONS  (PRN):  morphine  - Injectable 2 milliGRAM(s) IV Push every 4 hours PRN Severe Pain (7 - 10)/breakthrough pain  traMADol 25 milliGRAM(s) Oral every 6 hours PRN Moderate Pain (4 - 6)  traMADol 50 milliGRAM(s) Oral every 6 hours PRN Severe Pain (7 - 10)      Vital Signs Last 24 Hrs  T(C): 36.4 (04 Sep 2021 22:33), Max: 37.1 (04 Sep 2021 05:00)  T(F): 97.6 (04 Sep 2021 22:33), Max: 98.8 (04 Sep 2021 05:00)  HR: 60 (04 Sep 2021 22:33) (58 - 79)  BP: 115/58 (04 Sep 2021 22:33) (111/69 - 150/66)  BP(mean): --  RR: 18 (04 Sep 2021 22:33) (16 - 18)  SpO2: 98% (04 Sep 2021 22:33) (91% - 98%)    Physical Exam:  General: NAD  Respiratory: No increased WOB  CV: +s1/s2  Abdomen: soft  Extremities: rle edematous but compartments soft, nvi, warm to touch, wbat        I&O's Detail    03 Sep 2021 07:01  -  04 Sep 2021 07:00  --------------------------------------------------------  IN:  Total IN: 0 mL    OUT:    Voided (mL): 700 mL  Total OUT: 700 mL    Total NET: -700 mL      04 Sep 2021 07:01  -  05 Sep 2021 01:52  --------------------------------------------------------  IN:  Total IN: 0 mL    OUT:    Voided (mL): 450 mL  Total OUT: 450 mL    Total NET: -450 mL          LABS:                        7.5    6.57  )-----------( 212      ( 03 Sep 2021 06:01 )             23.8     09-04    139  |  105  |  31.4<H>  ----------------------------<  135<H>  4.2   |  25.0  |  0.87    Ca    8.5<L>      04 Sep 2021 07:59  Phos  2.5     09-04  Mg     2.1     09-04            RADIOLOGY & ADDITIONAL STUDIES:

## 2021-09-06 LAB
GLUCOSE BLDC GLUCOMTR-MCNC: 108 MG/DL — HIGH (ref 70–99)
GLUCOSE BLDC GLUCOMTR-MCNC: 121 MG/DL — HIGH (ref 70–99)
GLUCOSE BLDC GLUCOMTR-MCNC: 154 MG/DL — HIGH (ref 70–99)
GLUCOSE BLDC GLUCOMTR-MCNC: 160 MG/DL — HIGH (ref 70–99)

## 2021-09-06 PROCEDURE — 99231 SBSQ HOSP IP/OBS SF/LOW 25: CPT | Mod: GC

## 2021-09-06 RX ORDER — IPRATROPIUM/ALBUTEROL SULFATE 18-103MCG
3 AEROSOL WITH ADAPTER (GRAM) INHALATION ONCE
Refills: 0 | Status: COMPLETED | OUTPATIENT
Start: 2021-09-06 | End: 2021-09-06

## 2021-09-06 RX ADMIN — TRAMADOL HYDROCHLORIDE 25 MILLIGRAM(S): 50 TABLET ORAL at 03:56

## 2021-09-06 RX ADMIN — ENOXAPARIN SODIUM 40 MILLIGRAM(S): 100 INJECTION SUBCUTANEOUS at 11:36

## 2021-09-06 RX ADMIN — Medication 1: at 11:37

## 2021-09-06 RX ADMIN — Medication 650 MILLIGRAM(S): at 11:36

## 2021-09-06 RX ADMIN — LIDOCAINE 1 PATCH: 4 CREAM TOPICAL at 19:52

## 2021-09-06 RX ADMIN — Medication 650 MILLIGRAM(S): at 21:31

## 2021-09-06 RX ADMIN — Medication 650 MILLIGRAM(S): at 22:30

## 2021-09-06 RX ADMIN — Medication 650 MILLIGRAM(S): at 18:00

## 2021-09-06 RX ADMIN — Medication 650 MILLIGRAM(S): at 06:33

## 2021-09-06 RX ADMIN — TRAMADOL HYDROCHLORIDE 25 MILLIGRAM(S): 50 TABLET ORAL at 04:56

## 2021-09-06 RX ADMIN — ATENOLOL 25 MILLIGRAM(S): 25 TABLET ORAL at 05:33

## 2021-09-06 RX ADMIN — Medication 650 MILLIGRAM(S): at 05:33

## 2021-09-06 RX ADMIN — PANTOPRAZOLE SODIUM 40 MILLIGRAM(S): 20 TABLET, DELAYED RELEASE ORAL at 05:33

## 2021-09-06 RX ADMIN — Medication 650 MILLIGRAM(S): at 19:00

## 2021-09-06 RX ADMIN — Medication 650 MILLIGRAM(S): at 12:30

## 2021-09-06 RX ADMIN — SIMVASTATIN 40 MILLIGRAM(S): 20 TABLET, FILM COATED ORAL at 21:31

## 2021-09-06 RX ADMIN — FAMOTIDINE 40 MILLIGRAM(S): 10 INJECTION INTRAVENOUS at 21:32

## 2021-09-06 RX ADMIN — Medication 3 MILLILITER(S): at 23:57

## 2021-09-06 RX ADMIN — SENNA PLUS 1 TABLET(S): 8.6 TABLET ORAL at 21:31

## 2021-09-06 RX ADMIN — LIDOCAINE 1 PATCH: 4 CREAM TOPICAL at 18:04

## 2021-09-06 RX ADMIN — LOSARTAN POTASSIUM 12.5 MILLIGRAM(S): 100 TABLET, FILM COATED ORAL at 05:33

## 2021-09-06 NOTE — PROGRESS NOTE ADULT - SUBJECTIVE AND OBJECTIVE BOX
INTERVAL HPI/OVERNIGHT EVENTS:    No acute events overnight.     MEDICATIONS  (STANDING):  acetaminophen   Tablet .. 650 milliGRAM(s) Oral every 6 hours  ATENolol  Tablet 25 milliGRAM(s) Oral daily  dextrose 40% Gel 15 Gram(s) Oral once  dextrose 5%. 1000 milliLiter(s) (50 mL/Hr) IV Continuous <Continuous>  dextrose 5%. 1000 milliLiter(s) (100 mL/Hr) IV Continuous <Continuous>  dextrose 50% Injectable 25 Gram(s) IV Push once  dextrose 50% Injectable 12.5 Gram(s) IV Push once  dextrose 50% Injectable 25 Gram(s) IV Push once  enoxaparin Injectable 40 milliGRAM(s) SubCutaneous daily  famotidine    Tablet 40 milliGRAM(s) Oral at bedtime  glucagon  Injectable 1 milliGRAM(s) IntraMuscular once  insulin lispro (ADMELOG) corrective regimen sliding scale   SubCutaneous three times a day before meals  lidocaine   4% Patch 1 Patch Transdermal daily  losartan 12.5 milliGRAM(s) Oral two times a day  pantoprazole    Tablet 40 milliGRAM(s) Oral before breakfast  senna 1 Tablet(s) Oral at bedtime  simvastatin 40 milliGRAM(s) Oral at bedtime    MEDICATIONS  (PRN):  morphine  - Injectable 2 milliGRAM(s) IV Push every 4 hours PRN Severe Pain (7 - 10)/breakthrough pain  traMADol 25 milliGRAM(s) Oral every 6 hours PRN Moderate Pain (4 - 6)  traMADol 50 milliGRAM(s) Oral every 6 hours PRN Severe Pain (7 - 10)    Vital Signs Last 24 Hrs  T(C): 37.7 (06 Sep 2021 04:06), Max: 37.7 (06 Sep 2021 04:06)  T(F): 99.8 (06 Sep 2021 04:06), Max: 99.8 (06 Sep 2021 04:06)  HR: 68 (06 Sep 2021 04:06) (59 - 68)  BP: 151/75 (06 Sep 2021 04:06) (123/70 - 154/66)  BP(mean): --  ABP: --  ABP(mean): --  RR: 18 (06 Sep 2021 04:06) (18 - 18)  SpO2: 96% (06 Sep 2021 04:06) (91% - 96%)      Physical Exam:  General: NAD  Respiratory: No increased WOB  CV: +s1/s2  Abdomen: soft  Extremities: rle edematous but compartments soft, nvi, warm to touch, wbat    I&O's Detail    04 Sep 2021 07:01  -  05 Sep 2021 07:00  --------------------------------------------------------  IN:  Total IN: 0 mL    OUT:    Voided (mL): 450 mL  Total OUT: 450 mL    Total NET: -450 mL    .  LABS:     09-04    139  |  105  |  31.4<H>  ----------------------------<  135<H>  4.2   |  25.0  |  0.87    Ca    8.5<L>      04 Sep 2021 07:59  Phos  2.5     09-04  Mg     2.1     09-04                RADIOLOGY, EKG & ADDITIONAL TESTS: Reviewed.

## 2021-09-07 LAB
APPEARANCE UR: CLEAR — SIGNIFICANT CHANGE UP
BILIRUB UR-MCNC: NEGATIVE — SIGNIFICANT CHANGE UP
COLOR SPEC: YELLOW — SIGNIFICANT CHANGE UP
DIFF PNL FLD: NEGATIVE — SIGNIFICANT CHANGE UP
GLUCOSE BLDC GLUCOMTR-MCNC: 124 MG/DL — HIGH (ref 70–99)
GLUCOSE BLDC GLUCOMTR-MCNC: 149 MG/DL — HIGH (ref 70–99)
GLUCOSE BLDC GLUCOMTR-MCNC: 152 MG/DL — HIGH (ref 70–99)
GLUCOSE UR QL: NEGATIVE MG/DL — SIGNIFICANT CHANGE UP
KETONES UR-MCNC: NEGATIVE — SIGNIFICANT CHANGE UP
LEUKOCYTE ESTERASE UR-ACNC: NEGATIVE — SIGNIFICANT CHANGE UP
NITRITE UR-MCNC: NEGATIVE — SIGNIFICANT CHANGE UP
PH UR: 6.5 — SIGNIFICANT CHANGE UP (ref 5–8)
PROT UR-MCNC: NEGATIVE MG/DL — SIGNIFICANT CHANGE UP
SARS-COV-2 RNA SPEC QL NAA+PROBE: SIGNIFICANT CHANGE UP
SP GR SPEC: 1.01 — SIGNIFICANT CHANGE UP (ref 1.01–1.02)
UROBILINOGEN FLD QL: 8 MG/DL

## 2021-09-07 PROCEDURE — 71045 X-RAY EXAM CHEST 1 VIEW: CPT | Mod: 26

## 2021-09-07 PROCEDURE — 99231 SBSQ HOSP IP/OBS SF/LOW 25: CPT

## 2021-09-07 RX ORDER — LIDOCAINE 4 G/100G
1 CREAM TOPICAL
Qty: 0 | Refills: 0 | DISCHARGE
Start: 2021-09-07

## 2021-09-07 RX ORDER — FAMOTIDINE 10 MG/ML
0 INJECTION INTRAVENOUS
Qty: 0 | Refills: 0 | DISCHARGE

## 2021-09-07 RX ORDER — ENOXAPARIN SODIUM 100 MG/ML
40 INJECTION SUBCUTANEOUS
Qty: 0 | Refills: 0 | DISCHARGE
Start: 2021-09-07

## 2021-09-07 RX ORDER — SIMVASTATIN 20 MG/1
1 TABLET, FILM COATED ORAL
Qty: 0 | Refills: 0 | DISCHARGE

## 2021-09-07 RX ORDER — ATENOLOL 25 MG/1
1 TABLET ORAL
Qty: 0 | Refills: 0 | DISCHARGE

## 2021-09-07 RX ORDER — ACETAMINOPHEN 500 MG
2 TABLET ORAL
Qty: 0 | Refills: 0 | DISCHARGE
Start: 2021-09-07

## 2021-09-07 RX ORDER — ASPIRIN/CALCIUM CARB/MAGNESIUM 324 MG
1 TABLET ORAL
Qty: 0 | Refills: 0 | DISCHARGE

## 2021-09-07 RX ORDER — OMEPRAZOLE 10 MG/1
1 CAPSULE, DELAYED RELEASE ORAL
Qty: 0 | Refills: 0 | DISCHARGE

## 2021-09-07 RX ADMIN — LIDOCAINE 1 PATCH: 4 CREAM TOPICAL at 06:25

## 2021-09-07 RX ADMIN — ATENOLOL 25 MILLIGRAM(S): 25 TABLET ORAL at 06:11

## 2021-09-07 RX ADMIN — SENNA PLUS 1 TABLET(S): 8.6 TABLET ORAL at 23:47

## 2021-09-07 RX ADMIN — Medication 650 MILLIGRAM(S): at 11:44

## 2021-09-07 RX ADMIN — Medication 650 MILLIGRAM(S): at 07:11

## 2021-09-07 RX ADMIN — LOSARTAN POTASSIUM 12.5 MILLIGRAM(S): 100 TABLET, FILM COATED ORAL at 06:11

## 2021-09-07 RX ADMIN — Medication 650 MILLIGRAM(S): at 18:23

## 2021-09-07 RX ADMIN — LIDOCAINE 1 PATCH: 4 CREAM TOPICAL at 23:49

## 2021-09-07 RX ADMIN — ENOXAPARIN SODIUM 40 MILLIGRAM(S): 100 INJECTION SUBCUTANEOUS at 11:44

## 2021-09-07 RX ADMIN — Medication 650 MILLIGRAM(S): at 16:54

## 2021-09-07 RX ADMIN — Medication 650 MILLIGRAM(S): at 12:40

## 2021-09-07 RX ADMIN — Medication 1: at 11:44

## 2021-09-07 RX ADMIN — Medication 650 MILLIGRAM(S): at 06:11

## 2021-09-07 RX ADMIN — LOSARTAN POTASSIUM 12.5 MILLIGRAM(S): 100 TABLET, FILM COATED ORAL at 16:54

## 2021-09-07 RX ADMIN — LIDOCAINE 1 PATCH: 4 CREAM TOPICAL at 23:48

## 2021-09-07 RX ADMIN — SIMVASTATIN 40 MILLIGRAM(S): 20 TABLET, FILM COATED ORAL at 23:48

## 2021-09-07 RX ADMIN — Medication 650 MILLIGRAM(S): at 23:48

## 2021-09-07 RX ADMIN — PANTOPRAZOLE SODIUM 40 MILLIGRAM(S): 20 TABLET, DELAYED RELEASE ORAL at 06:11

## 2021-09-07 RX ADMIN — FAMOTIDINE 40 MILLIGRAM(S): 10 INJECTION INTRAVENOUS at 23:48

## 2021-09-07 RX ADMIN — LIDOCAINE 1 PATCH: 4 CREAM TOPICAL at 11:44

## 2021-09-07 NOTE — PROGRESS NOTE ADULT - SUBJECTIVE AND OBJECTIVE BOX
SUBJECTIVE:    No acute events overnight    Vital Signs Last 24 Hrs  T(C): 36.4 (07 Sep 2021 04:30), Max: 36.7 (06 Sep 2021 17:26)  T(F): 97.6 (07 Sep 2021 04:30), Max: 98 (06 Sep 2021 17:26)  HR: 61 (07 Sep 2021 04:30) (55 - 61)  BP: 137/61 (07 Sep 2021 04:30) (106/65 - 137/61)  BP(mean): --  RR: 18 (07 Sep 2021 04:30) (18 - 18)  SpO2: 95% (07 Sep 2021 04:30) (94% - 98%)    PHYSICAL EXAM:  General: NAD  Respiratory: No increased WOB  CV: +s1/s2  Abdomen: soft  Extremities: rle edematous but compartments soft, nvi, warm to touch, wbat                        I&O's Summary    06 Sep 2021 07:01  -  07 Sep 2021 07:00  --------------------------------------------------------  IN: 0 mL / OUT: 400 mL / NET: -400 mL      I&O's Detail    06 Sep 2021 07:01  -  07 Sep 2021 07:00  --------------------------------------------------------  IN:  Total IN: 0 mL    OUT:    Voided (mL): 400 mL  Total OUT: 400 mL    Total NET: -400 mL          MEDICATIONS  (STANDING):  acetaminophen   Tablet .. 650 milliGRAM(s) Oral every 6 hours  ATENolol  Tablet 25 milliGRAM(s) Oral daily  dextrose 40% Gel 15 Gram(s) Oral once  dextrose 5%. 1000 milliLiter(s) (50 mL/Hr) IV Continuous <Continuous>  dextrose 5%. 1000 milliLiter(s) (100 mL/Hr) IV Continuous <Continuous>  dextrose 50% Injectable 25 Gram(s) IV Push once  dextrose 50% Injectable 12.5 Gram(s) IV Push once  dextrose 50% Injectable 25 Gram(s) IV Push once  enoxaparin Injectable 40 milliGRAM(s) SubCutaneous daily  famotidine    Tablet 40 milliGRAM(s) Oral at bedtime  glucagon  Injectable 1 milliGRAM(s) IntraMuscular once  insulin lispro (ADMELOG) corrective regimen sliding scale   SubCutaneous three times a day before meals  lidocaine   4% Patch 1 Patch Transdermal daily  losartan 12.5 milliGRAM(s) Oral two times a day  pantoprazole    Tablet 40 milliGRAM(s) Oral before breakfast  senna 1 Tablet(s) Oral at bedtime  simvastatin 40 milliGRAM(s) Oral at bedtime    MEDICATIONS  (PRN):      LABS:                RADIOLOGY & ADDITIONAL STUDIES:

## 2021-09-08 ENCOUNTER — TRANSCRIPTION ENCOUNTER (OUTPATIENT)
Age: 86
End: 2021-09-08

## 2021-09-08 VITALS
SYSTOLIC BLOOD PRESSURE: 152 MMHG | TEMPERATURE: 99 F | DIASTOLIC BLOOD PRESSURE: 89 MMHG | HEART RATE: 107 BPM | RESPIRATION RATE: 18 BRPM | OXYGEN SATURATION: 95 %

## 2021-09-08 LAB
ALBUMIN SERPL ELPH-MCNC: 3.2 G/DL — LOW (ref 3.3–5.2)
ALP SERPL-CCNC: 155 U/L — HIGH (ref 40–120)
ALT FLD-CCNC: 22 U/L — SIGNIFICANT CHANGE UP
ANION GAP SERPL CALC-SCNC: 10 MMOL/L — SIGNIFICANT CHANGE UP (ref 5–17)
ANISOCYTOSIS BLD QL: SLIGHT — SIGNIFICANT CHANGE UP
AST SERPL-CCNC: 36 U/L — HIGH
BASOPHILS # BLD AUTO: 0 K/UL — SIGNIFICANT CHANGE UP (ref 0–0.2)
BASOPHILS NFR BLD AUTO: 0 % — SIGNIFICANT CHANGE UP (ref 0–2)
BILIRUB SERPL-MCNC: 0.9 MG/DL — SIGNIFICANT CHANGE UP (ref 0.4–2)
BUN SERPL-MCNC: 19.7 MG/DL — SIGNIFICANT CHANGE UP (ref 8–20)
CALCIUM SERPL-MCNC: 8.5 MG/DL — LOW (ref 8.6–10.2)
CHLORIDE SERPL-SCNC: 101 MMOL/L — SIGNIFICANT CHANGE UP (ref 98–107)
CO2 SERPL-SCNC: 27 MMOL/L — SIGNIFICANT CHANGE UP (ref 22–29)
CREAT SERPL-MCNC: 0.77 MG/DL — SIGNIFICANT CHANGE UP (ref 0.5–1.3)
EOSINOPHIL # BLD AUTO: 0.17 K/UL — SIGNIFICANT CHANGE UP (ref 0–0.5)
EOSINOPHIL NFR BLD AUTO: 2.6 % — SIGNIFICANT CHANGE UP (ref 0–6)
GIANT PLATELETS BLD QL SMEAR: PRESENT — SIGNIFICANT CHANGE UP
GLUCOSE BLDC GLUCOMTR-MCNC: 122 MG/DL — HIGH (ref 70–99)
GLUCOSE BLDC GLUCOMTR-MCNC: 124 MG/DL — HIGH (ref 70–99)
GLUCOSE BLDC GLUCOMTR-MCNC: 161 MG/DL — HIGH (ref 70–99)
GLUCOSE SERPL-MCNC: 115 MG/DL — HIGH (ref 70–99)
HCT VFR BLD CALC: 29 % — LOW (ref 34.5–45)
HGB BLD-MCNC: 9 G/DL — LOW (ref 11.5–15.5)
HYPOCHROMIA BLD QL: SLIGHT — SIGNIFICANT CHANGE UP
LYMPHOCYTES # BLD AUTO: 1.24 K/UL — SIGNIFICANT CHANGE UP (ref 1–3.3)
LYMPHOCYTES # BLD AUTO: 18.6 % — SIGNIFICANT CHANGE UP (ref 13–44)
MANUAL SMEAR VERIFICATION: SIGNIFICANT CHANGE UP
MCHC RBC-ENTMCNC: 29.3 PG — SIGNIFICANT CHANGE UP (ref 27–34)
MCHC RBC-ENTMCNC: 31 GM/DL — LOW (ref 32–36)
MCV RBC AUTO: 94.5 FL — SIGNIFICANT CHANGE UP (ref 80–100)
MONOCYTES # BLD AUTO: 0.89 K/UL — SIGNIFICANT CHANGE UP (ref 0–0.9)
MONOCYTES NFR BLD AUTO: 13.3 % — SIGNIFICANT CHANGE UP (ref 2–14)
MYELOCYTES NFR BLD: 0.9 % — HIGH (ref 0–0)
NEUTROPHILS # BLD AUTO: 4.24 K/UL — SIGNIFICANT CHANGE UP (ref 1.8–7.4)
NEUTROPHILS NFR BLD AUTO: 63.7 % — SIGNIFICANT CHANGE UP (ref 43–77)
NRBC # BLD: 1 /100 — HIGH (ref 0–0)
OVALOCYTES BLD QL SMEAR: SLIGHT — SIGNIFICANT CHANGE UP
PLAT MORPH BLD: NORMAL — SIGNIFICANT CHANGE UP
PLATELET # BLD AUTO: 448 K/UL — HIGH (ref 150–400)
POIKILOCYTOSIS BLD QL AUTO: SLIGHT — SIGNIFICANT CHANGE UP
POLYCHROMASIA BLD QL SMEAR: SLIGHT — SIGNIFICANT CHANGE UP
POTASSIUM SERPL-MCNC: 4.5 MMOL/L — SIGNIFICANT CHANGE UP (ref 3.5–5.3)
POTASSIUM SERPL-SCNC: 4.5 MMOL/L — SIGNIFICANT CHANGE UP (ref 3.5–5.3)
PROT SERPL-MCNC: 6.7 G/DL — SIGNIFICANT CHANGE UP (ref 6.6–8.7)
RBC # BLD: 3.07 M/UL — LOW (ref 3.8–5.2)
RBC # FLD: 13.7 % — SIGNIFICANT CHANGE UP (ref 10.3–14.5)
RBC BLD AUTO: ABNORMAL
SCHISTOCYTES BLD QL AUTO: SLIGHT — SIGNIFICANT CHANGE UP
SMUDGE CELLS # BLD: PRESENT — SIGNIFICANT CHANGE UP
SODIUM SERPL-SCNC: 138 MMOL/L — SIGNIFICANT CHANGE UP (ref 135–145)
TARGETS BLD QL SMEAR: SLIGHT — SIGNIFICANT CHANGE UP
VARIANT LYMPHS # BLD: 0.9 % — SIGNIFICANT CHANGE UP (ref 0–6)
WBC # BLD: 6.66 K/UL — SIGNIFICANT CHANGE UP (ref 3.8–10.5)
WBC # FLD AUTO: 6.66 K/UL — SIGNIFICANT CHANGE UP (ref 3.8–10.5)

## 2021-09-08 PROCEDURE — 36415 COLL VENOUS BLD VENIPUNCTURE: CPT

## 2021-09-08 PROCEDURE — 83735 ASSAY OF MAGNESIUM: CPT

## 2021-09-08 PROCEDURE — 86850 RBC ANTIBODY SCREEN: CPT

## 2021-09-08 PROCEDURE — 85730 THROMBOPLASTIN TIME PARTIAL: CPT

## 2021-09-08 PROCEDURE — 85027 COMPLETE CBC AUTOMATED: CPT

## 2021-09-08 PROCEDURE — 85025 COMPLETE CBC W/AUTO DIFF WBC: CPT

## 2021-09-08 PROCEDURE — 97110 THERAPEUTIC EXERCISES: CPT

## 2021-09-08 PROCEDURE — 84100 ASSAY OF PHOSPHORUS: CPT

## 2021-09-08 PROCEDURE — 83036 HEMOGLOBIN GLYCOSYLATED A1C: CPT

## 2021-09-08 PROCEDURE — 76000 FLUOROSCOPY <1 HR PHYS/QHP: CPT

## 2021-09-08 PROCEDURE — 73552 X-RAY EXAM OF FEMUR 2/>: CPT

## 2021-09-08 PROCEDURE — U0003: CPT

## 2021-09-08 PROCEDURE — 86900 BLOOD TYPING SEROLOGIC ABO: CPT

## 2021-09-08 PROCEDURE — 96374 THER/PROPH/DIAG INJ IV PUSH: CPT

## 2021-09-08 PROCEDURE — 87040 BLOOD CULTURE FOR BACTERIA: CPT

## 2021-09-08 PROCEDURE — 82962 GLUCOSE BLOOD TEST: CPT

## 2021-09-08 PROCEDURE — 97116 GAIT TRAINING THERAPY: CPT

## 2021-09-08 PROCEDURE — 73502 X-RAY EXAM HIP UNI 2-3 VIEWS: CPT

## 2021-09-08 PROCEDURE — U0005: CPT

## 2021-09-08 PROCEDURE — 99285 EMERGENCY DEPT VISIT HI MDM: CPT

## 2021-09-08 PROCEDURE — C1713: CPT

## 2021-09-08 PROCEDURE — 71045 X-RAY EXAM CHEST 1 VIEW: CPT

## 2021-09-08 PROCEDURE — 71046 X-RAY EXAM CHEST 2 VIEWS: CPT

## 2021-09-08 PROCEDURE — 80048 BASIC METABOLIC PNL TOTAL CA: CPT

## 2021-09-08 PROCEDURE — C1769: CPT

## 2021-09-08 PROCEDURE — 85610 PROTHROMBIN TIME: CPT

## 2021-09-08 PROCEDURE — 97166 OT EVAL MOD COMPLEX 45 MIN: CPT

## 2021-09-08 PROCEDURE — 86901 BLOOD TYPING SEROLOGIC RH(D): CPT

## 2021-09-08 PROCEDURE — 86769 SARS-COV-2 COVID-19 ANTIBODY: CPT

## 2021-09-08 PROCEDURE — 93005 ELECTROCARDIOGRAM TRACING: CPT

## 2021-09-08 PROCEDURE — 81003 URINALYSIS AUTO W/O SCOPE: CPT

## 2021-09-08 PROCEDURE — 80053 COMPREHEN METABOLIC PANEL: CPT

## 2021-09-08 PROCEDURE — 99231 SBSQ HOSP IP/OBS SF/LOW 25: CPT

## 2021-09-08 PROCEDURE — 94640 AIRWAY INHALATION TREATMENT: CPT

## 2021-09-08 PROCEDURE — 97530 THERAPEUTIC ACTIVITIES: CPT

## 2021-09-08 PROCEDURE — 73030 X-RAY EXAM OF SHOULDER: CPT

## 2021-09-08 RX ADMIN — Medication 650 MILLIGRAM(S): at 17:50

## 2021-09-08 RX ADMIN — Medication 650 MILLIGRAM(S): at 01:20

## 2021-09-08 RX ADMIN — PANTOPRAZOLE SODIUM 40 MILLIGRAM(S): 20 TABLET, DELAYED RELEASE ORAL at 05:05

## 2021-09-08 RX ADMIN — Medication 650 MILLIGRAM(S): at 18:45

## 2021-09-08 RX ADMIN — Medication 650 MILLIGRAM(S): at 05:05

## 2021-09-08 RX ADMIN — Medication 1: at 17:50

## 2021-09-08 RX ADMIN — ENOXAPARIN SODIUM 40 MILLIGRAM(S): 100 INJECTION SUBCUTANEOUS at 12:31

## 2021-09-08 RX ADMIN — LOSARTAN POTASSIUM 12.5 MILLIGRAM(S): 100 TABLET, FILM COATED ORAL at 05:05

## 2021-09-08 RX ADMIN — Medication 650 MILLIGRAM(S): at 13:25

## 2021-09-08 RX ADMIN — LIDOCAINE 1 PATCH: 4 CREAM TOPICAL at 12:30

## 2021-09-08 RX ADMIN — Medication 650 MILLIGRAM(S): at 12:31

## 2021-09-08 RX ADMIN — ATENOLOL 25 MILLIGRAM(S): 25 TABLET ORAL at 05:05

## 2021-09-08 RX ADMIN — LOSARTAN POTASSIUM 12.5 MILLIGRAM(S): 100 TABLET, FILM COATED ORAL at 17:50

## 2021-09-08 NOTE — PROGRESS NOTE ADULT - REASON FOR ADMISSION
Right Hip Frx

## 2021-09-08 NOTE — PROGRESS NOTE ADULT - SUBJECTIVE AND OBJECTIVE BOX
SUBJECTIVE/24 hour events:  Patient is a 89yFemale s/p fall sustaining a right intertrochanteric fracture, now pod#8 of R IMN. Patient yesterday had 1 episode of a rectal temp of 103 with no other vital sign abnormality, UA and chest xray wnl, blood cultures pending, no further events overnight and all vitals wnl. Patient awaiting placement to [facility]      Vital Signs Last 24 Hrs  T(C): 37 (07 Sep 2021 23:51), Max: 39.5 (07 Sep 2021 16:53)  T(F): 98.6 (07 Sep 2021 23:51), Max: 103.1 (07 Sep 2021 16:53)  HR: 63 (07 Sep 2021 21:15) (61 - 76)  BP: 95/55 (07 Sep 2021 21:15) (95/55 - 161/65)  BP(mean): --  RR: 18 (07 Sep 2021 21:15) (18 - 18)  SpO2: 99% (07 Sep 2021 21:15) (95% - 99%)  Drug Dosing Weight  Height (cm): 304.8 (24 May 2021 14:51)  Weight (kg): 63.5 (31 Aug 2021 11:21)  BMI (kg/m2): 6.8 (31 Aug 2021 11:21)  BSA (m2): 2.65 (31 Aug 2021 11:21)  I&O's Detail    06 Sep 2021 07:01  -  07 Sep 2021 07:00  --------------------------------------------------------  IN:  Total IN: 0 mL    OUT:    Voided (mL): 400 mL  Total OUT: 400 mL    Total NET: -400 mL      07 Sep 2021 07:01  -  08 Sep 2021 04:11  --------------------------------------------------------  IN:  Total IN: 0 mL    OUT:    Voided (mL): 375 mL  Total OUT: 375 mL    Total NET: -375 mL        Allergies    No Known Allergies    Intolerances                ROS:    PHYSICAL EXAM:  General: NAD  Respiratory: No increased WOB  CV: +s1/s2  Abdomen: soft  Extremities: rle edematous but compartments soft, nvi, warm to touch, wbat        MEDICATIONS  (STANDING):  acetaminophen   Tablet .. 650 milliGRAM(s) Oral every 6 hours  ATENolol  Tablet 25 milliGRAM(s) Oral daily  dextrose 40% Gel 15 Gram(s) Oral once  dextrose 5%. 1000 milliLiter(s) (50 mL/Hr) IV Continuous <Continuous>  dextrose 5%. 1000 milliLiter(s) (100 mL/Hr) IV Continuous <Continuous>  dextrose 50% Injectable 25 Gram(s) IV Push once  dextrose 50% Injectable 12.5 Gram(s) IV Push once  dextrose 50% Injectable 25 Gram(s) IV Push once  enoxaparin Injectable 40 milliGRAM(s) SubCutaneous daily  famotidine    Tablet 40 milliGRAM(s) Oral at bedtime  glucagon  Injectable 1 milliGRAM(s) IntraMuscular once  insulin lispro (ADMELOG) corrective regimen sliding scale   SubCutaneous three times a day before meals  lidocaine   4% Patch 1 Patch Transdermal daily  losartan 12.5 milliGRAM(s) Oral two times a day  pantoprazole    Tablet 40 milliGRAM(s) Oral before breakfast  senna 1 Tablet(s) Oral at bedtime  simvastatin 40 milliGRAM(s) Oral at bedtime    MEDICATIONS  (PRN):      RADIOLOGY STUDIES:    CULTURES:

## 2021-09-08 NOTE — DISCHARGE NOTE NURSING/CASE MANAGEMENT/SOCIAL WORK - PATIENT PORTAL LINK FT
You can access the FollowMyHealth Patient Portal offered by [facility] by registering at the following website: [email or web address]. By joining "[employer]"’s FollowMyHealth portal, you will also be able to view your health information using other applications (apps) compatible with our system.

## 2021-09-08 NOTE — PROGRESS NOTE ADULT - ASSESSMENT
88 yo female with PMHx CAD s/p 2 stents, HTN, DM presenting with R IT fracture after a fall, now pod#4 of IMN placement.    Hgb stable  neurovascular checks  pain control  continue home meds  dash diet  incentive spirometer  dvt ppx x4 weeks  dispo to [facility]  
88 yo female with PMHx CAD s/p 2 stents, HTN, DM presenting with R IT fracture after a fall, now s/p IMN 8/31    Reg diet  OOB  Dispo: [facility]
88 yo female with PMHx CAD s/p 2 stents, HTN, DM presenting with R IT fracture after a fall, now s/p IMN 8/31    Reg diet  OOB  Dispo: [facility]
89 yo female with PMHx CAD s/p 2 stents, HTN, DM presenting with R IT fracture after a fall, now pod#1 of IMN  neurovascular checks  pain control  continue home meds  dash diet  good pulmonary toliet  incentive spirometer  dvt ppx x4 weeks  needs pt/ot for dispo 
89 yo female with PMHx CAD s/p 2 stents, HTN, DM presenting with R IT fracture after a fall, now pod#2 of IMN placement.    Hgb stable from yesterday at 7.5   neurovascular checks  pain control  continue home meds  dash diet  incentive spirometer  dvt ppx x4 weeks  dispo to [facility] pending Hgb stabilization and palcement   
88 yo female with PMHx CAD s/p 2 stents, HTN, DM presenting with R IT fracture after a fall, now pod#1 of IMN  neurovascular checks  pain control  continue home meds  dash diet  IV lock  good pulmonary toliet  incentive spirometer  dvt ppx x4 weeks  diso to [facility]   
89 yo female with PMHx CAD s/p 2 stents, HTN, DM presenting with R IT fracture after a fall. 
88 yo female with PMHx CAD s/p 2 stents, HTN, DM presenting with R IT fracture after a fall, now s/p IMN 8/31    Reg diet  OOB  Dispo: [facility]
89 yo female with PMHx CAD s/p 2 stents, HTN, DM presenting with R IT fracture after a fall, now s/p R IMN 8/31    consistent carb diet  good glucose control  continue home medications  neurovascular checks  f/u am labs  monitor if fever  f/u blood cultures   OOB  monitor vital signs   Dispo: [facility]

## 2021-09-08 NOTE — DISCHARGE NOTE NURSING/CASE MANAGEMENT/SOCIAL WORK - NSDCVIVACCINE_GEN_ALL_CORE_FT
Tdap; 01-Oct-2015 22:48; Rico Almanzar); [employer]; [id number]; IntraMuscular; Deltoid Left.; 0.5 milliLiter(s); VIS (VIS Published: 09-May-2013, VIS Presented: 01-Oct-2015);

## 2021-09-08 NOTE — PROGRESS NOTE ADULT - ATTENDING COMMENTS
Agree with above assessment.  The patient was seen and examined by me.  The patient is stable with no complaints. Right thigh dressings clean and dry, right leg warm and perfused.  Continue with pain control, PT, trauma stable for discharge planning.
Agree with above assessment.  The patient was seen and examined by me.  The patient is with no complaints.  The right thigh wounds are clean, muscle compartments are soft.  The patient is trauma stable.  Continue PT, discharge planning.
I have seen and examined the patient  febrile last night,  CXR clear, WBC normal, UA normal  NO contraindication for rehab placement.  PT  DVt chemoprophylaxes
I have seen and examined the patient during rounds from 8-10a  Stable loss post op anemia  NO new issues  DVT chemoprophyaxys  Dispo planning
Agree with above assessment.  The patient was seen and examined by me. The patient is with no new events overnight.  The right thigh is stable.  The patient is trauma stable for discharge planning.
I have seen and examined the patient during rounds from 8-9a  no new issues  blood loss anemia post pm  PT  DVT chemoprophylaxes  Dispo planning
I have seen and examined the patient during rounds from 9-12p  no new issues  'DVT chemoprophylaxes  'awaiting placement
I have seen and examined the patient during rounds from 8-9a  pain at op site  Blood loss post op anemia stable remain asymptomatic  DVT chemoprophylaxes  PT dispo planning
89-year-old female with PMH of CAD status post stents, HTN, DM, presents status post fall where she was found to have a right hip fracture   Awake, alert, oriented x3, no acute distress, speaks Janelle only  Pulmonary clear to auscultation  Cardiovascular regular rate and rhythm  GI obese, benign  MS right hip tenderness and decreased active range of motion  Plan  1.  Orthopedics consulted and plan for ORIF today  2.  Patient evaluated by anesthesia and agree with OR today  3.  I discussed case with family who will be present during the perioperative.    Code [id number]

## 2021-09-08 NOTE — PROGRESS NOTE ADULT - PROVIDER SPECIALTY LIST ADULT
Orthopedics
Trauma Surgery
Orthopedics
Surgery
Trauma Surgery

## 2021-09-09 PROBLEM — E78.00 PURE HYPERCHOLESTEROLEMIA, UNSPECIFIED: Chronic | Status: ACTIVE | Noted: 2021-08-31

## 2021-09-09 PROBLEM — E11.9 TYPE 2 DIABETES MELLITUS WITHOUT COMPLICATIONS: Chronic | Status: ACTIVE | Noted: 2021-08-30

## 2021-09-09 PROBLEM — Z87.19 PERSONAL HISTORY OF OTHER DISEASES OF THE DIGESTIVE SYSTEM: Chronic | Status: ACTIVE | Noted: 2021-08-31

## 2021-09-09 PROBLEM — I25.10 ATHEROSCLEROTIC HEART DISEASE OF NATIVE CORONARY ARTERY WITHOUT ANGINA PECTORIS: Chronic | Status: ACTIVE | Noted: 2021-08-31

## 2021-09-09 PROBLEM — I10 ESSENTIAL (PRIMARY) HYPERTENSION: Chronic | Status: ACTIVE | Noted: 2021-08-30

## 2021-09-21 ENCOUNTER — EMERGENCY (EMERGENCY)
Facility: HOSPITAL | Age: 86
LOS: 1 days | Discharge: HOME | End: 2021-09-21
Attending: STUDENT IN AN ORGANIZED HEALTH CARE EDUCATION/TRAINING PROGRAM
Payer: MEDICARE

## 2021-09-21 VITALS
HEART RATE: 58 BPM | SYSTOLIC BLOOD PRESSURE: 112 MMHG | DIASTOLIC BLOOD PRESSURE: 58 MMHG | WEIGHT: 179.9 LBS | HEIGHT: 72 IN | OXYGEN SATURATION: 98 % | TEMPERATURE: 98 F | RESPIRATION RATE: 20 BRPM

## 2021-09-21 VITALS
DIASTOLIC BLOOD PRESSURE: 72 MMHG | TEMPERATURE: 98 F | SYSTOLIC BLOOD PRESSURE: 113 MMHG | OXYGEN SATURATION: 98 % | RESPIRATION RATE: 20 BRPM

## 2021-09-21 DIAGNOSIS — Z95.5 PRESENCE OF CORONARY ANGIOPLASTY IMPLANT AND GRAFT: Chronic | ICD-10-CM

## 2021-09-21 DIAGNOSIS — H26.9 UNSPECIFIED CATARACT: Chronic | ICD-10-CM

## 2021-09-21 LAB
ALBUMIN SERPL ELPH-MCNC: 3.4 G/DL — SIGNIFICANT CHANGE UP (ref 3.3–5.2)
ALP SERPL-CCNC: 145 U/L — HIGH (ref 40–120)
ALT FLD-CCNC: 14 U/L — SIGNIFICANT CHANGE UP
ANION GAP SERPL CALC-SCNC: 10 MMOL/L — SIGNIFICANT CHANGE UP (ref 5–17)
AST SERPL-CCNC: 23 U/L — SIGNIFICANT CHANGE UP
BASOPHILS # BLD AUTO: 0.03 K/UL — SIGNIFICANT CHANGE UP (ref 0–0.2)
BASOPHILS NFR BLD AUTO: 0.4 % — SIGNIFICANT CHANGE UP (ref 0–2)
BILIRUB SERPL-MCNC: 0.7 MG/DL — SIGNIFICANT CHANGE UP (ref 0.4–2)
BUN SERPL-MCNC: 23.5 MG/DL — HIGH (ref 8–20)
CALCIUM SERPL-MCNC: 8.9 MG/DL — SIGNIFICANT CHANGE UP (ref 8.6–10.2)
CHLORIDE SERPL-SCNC: 98 MMOL/L — SIGNIFICANT CHANGE UP (ref 98–107)
CO2 SERPL-SCNC: 25 MMOL/L — SIGNIFICANT CHANGE UP (ref 22–29)
CREAT SERPL-MCNC: 0.91 MG/DL — SIGNIFICANT CHANGE UP (ref 0.5–1.3)
EOSINOPHIL # BLD AUTO: 0.27 K/UL — SIGNIFICANT CHANGE UP (ref 0–0.5)
EOSINOPHIL NFR BLD AUTO: 3.6 % — SIGNIFICANT CHANGE UP (ref 0–6)
GLUCOSE SERPL-MCNC: 106 MG/DL — HIGH (ref 70–99)
HCT VFR BLD CALC: 27.8 % — LOW (ref 34.5–45)
HGB BLD-MCNC: 8.8 G/DL — LOW (ref 11.5–15.5)
IMM GRANULOCYTES NFR BLD AUTO: 0.7 % — SIGNIFICANT CHANGE UP (ref 0–1.5)
LYMPHOCYTES # BLD AUTO: 1.14 K/UL — SIGNIFICANT CHANGE UP (ref 1–3.3)
LYMPHOCYTES # BLD AUTO: 15.1 % — SIGNIFICANT CHANGE UP (ref 13–44)
MCHC RBC-ENTMCNC: 29.7 PG — SIGNIFICANT CHANGE UP (ref 27–34)
MCHC RBC-ENTMCNC: 31.7 GM/DL — LOW (ref 32–36)
MCV RBC AUTO: 93.9 FL — SIGNIFICANT CHANGE UP (ref 80–100)
MONOCYTES # BLD AUTO: 0.93 K/UL — HIGH (ref 0–0.9)
MONOCYTES NFR BLD AUTO: 12.3 % — SIGNIFICANT CHANGE UP (ref 2–14)
NEUTROPHILS # BLD AUTO: 5.13 K/UL — SIGNIFICANT CHANGE UP (ref 1.8–7.4)
NEUTROPHILS NFR BLD AUTO: 67.9 % — SIGNIFICANT CHANGE UP (ref 43–77)
PLATELET # BLD AUTO: 432 K/UL — HIGH (ref 150–400)
POTASSIUM SERPL-MCNC: 4.8 MMOL/L — SIGNIFICANT CHANGE UP (ref 3.5–5.3)
POTASSIUM SERPL-SCNC: 4.8 MMOL/L — SIGNIFICANT CHANGE UP (ref 3.5–5.3)
PROT SERPL-MCNC: 6.5 G/DL — LOW (ref 6.6–8.7)
RBC # BLD: 2.96 M/UL — LOW (ref 3.8–5.2)
RBC # FLD: 13.2 % — SIGNIFICANT CHANGE UP (ref 10.3–14.5)
SARS-COV-2 RNA SPEC QL NAA+PROBE: SIGNIFICANT CHANGE UP
SODIUM SERPL-SCNC: 132 MMOL/L — LOW (ref 135–145)
WBC # BLD: 7.55 K/UL — SIGNIFICANT CHANGE UP (ref 3.8–10.5)
WBC # FLD AUTO: 7.55 K/UL — SIGNIFICANT CHANGE UP (ref 3.8–10.5)

## 2021-09-21 PROCEDURE — U0005: CPT

## 2021-09-21 PROCEDURE — 74019 RADEX ABDOMEN 2 VIEWS: CPT | Mod: 26

## 2021-09-21 PROCEDURE — 99284 EMERGENCY DEPT VISIT MOD MDM: CPT

## 2021-09-21 PROCEDURE — 85025 COMPLETE CBC W/AUTO DIFF WBC: CPT

## 2021-09-21 PROCEDURE — 80053 COMPREHEN METABOLIC PANEL: CPT

## 2021-09-21 PROCEDURE — U0003: CPT

## 2021-09-21 PROCEDURE — 74019 RADEX ABDOMEN 2 VIEWS: CPT

## 2021-09-21 PROCEDURE — 36415 COLL VENOUS BLD VENIPUNCTURE: CPT

## 2021-09-21 PROCEDURE — 99283 EMERGENCY DEPT VISIT LOW MDM: CPT | Mod: 25

## 2021-09-21 RX ORDER — LIDOCAINE 4 G/100G
1 CREAM TOPICAL ONCE
Refills: 0 | Status: COMPLETED | OUTPATIENT
Start: 2021-09-21 | End: 2021-09-21

## 2021-09-21 RX ORDER — HYDROCORTISONE 1 %
1 OINTMENT (GRAM) TOPICAL ONCE
Refills: 0 | Status: COMPLETED | OUTPATIENT
Start: 2021-09-21 | End: 2021-09-21

## 2021-09-21 RX ORDER — SENNOSIDES/DOCUSATE SODIUM 8.6MG-50MG
2 TABLET ORAL
Qty: 60 | Refills: 0
Start: 2021-09-21 | End: 2021-10-20

## 2021-09-21 RX ORDER — LIDOCAINE AND PRILOCAINE CREAM 25; 25 MG/G; MG/G
1 CREAM TOPICAL ONCE
Refills: 0 | Status: DISCONTINUED | OUTPATIENT
Start: 2021-09-21 | End: 2021-09-21

## 2021-09-21 RX ORDER — LIDOCAINE AND PRILOCAINE CREAM 25; 25 MG/G; MG/G
1 CREAM TOPICAL
Qty: 30 | Refills: 0
Start: 2021-09-21

## 2021-09-21 RX ORDER — HYDROCORTISONE 1 %
1 OINTMENT (GRAM) TOPICAL
Qty: 28 | Refills: 0
Start: 2021-09-21 | End: 2021-10-04

## 2021-09-21 RX ADMIN — Medication 1 SUPPOSITORY(S): at 17:19

## 2021-09-21 RX ADMIN — LIDOCAINE 1 APPLICATION(S): 4 CREAM TOPICAL at 12:29

## 2021-09-21 NOTE — ED ADULT NURSE NOTE - OBJECTIVE STATEMENT
Pt comes in c/o being constipated and having pain to buttocks. Pt noted to have stage 2 ulcer on both sides of rectum.

## 2021-09-21 NOTE — CHART NOTE - NSCHARTNOTEFT_GEN_A_CORE
SARAH Note: SARAH made aware pt is from [facility], pt does not wish to return to facility. SARAH met with pt and daughter- Nathan at bedside, daughter understanding that due to insurance constraints there are no other options for placement at this time, pt not admitted and is stable to return to [facility]. SARAH made pt and family aware transport will be arranged for pt to return back. SARAH arranged transport via [facility], [facility] and transport letter to be left on unit, no further SW services at this time SARAH Note: SW made aware pt is from [facility], pt does not wish to return to facility. SARAH met with pt and daughter- Nathan at bedside, daughter understanding that due to insurance constraints there are no other options for placement at this time, pt not admitted and is stable to return to [facility]. SARAH placed call to [facility], poke to nursing supervisor Telly who approved pt's return tonight. SARAH made pt and family aware transport will be arranged for pt to return back. SARAH arranged transport via [facility], [facility] and transport letter to be left on unit, no further SW services at this time

## 2021-09-21 NOTE — ED PROVIDER NOTE - NSFOLLOWUPINSTRUCTIONS_ED_ALL_ED_FT
1) Follow up with your doctor in 1-2 days  2) Return to the ER for worsening or concerning symptoms  3) take medication as prescribed    Fecal Impaction    WHAT YOU NEED TO KNOW:    Fecal impaction is a buildup of hardened bowel movements that gets stuck in your rectum or colon. Fecal impaction may cause a partial or complete blockage. This can make it hard for you to pass a bowel movement.    DISCHARGE INSTRUCTIONS:    Return to the emergency department if:   •You feel lightheaded, dizzy, or faint.      •You have worsening abdominal pain or bloating      Call your doctor if:   •You have a fever.      •You have blood in your bowel movements.      •You have questions or concerns about your condition or care.      Medicines:   •Medicines may help soften your bowel movement or increase the motion of your intestines. This can help clear bowel movement from your rectum or help you have a bowel movement more easily. Medicines may be given as a pill, suppository, or enema.      •Take your medicine as directed. Contact your healthcare provider if you think your medicine is not helping or if you have side effects. Tell him of her if you are allergic to any medicine. Keep a list of the medicines, vitamins, and herbs you take. Include the amounts, and when and why you take them. Bring the list or the pill bottles to follow-up visits. Carry your medicine list with you in case of an emergency.      Prevent another fecal impaction:   •Eat foods that are high in fiber. Healthy high-fiber foods include fruits, vegetables, whole-grain breads, and beans.      •Drink liquids as directed. You may need to drink more water than usual. Water helps keep your bowel movements soft and easier to pass. Ask your healthcare provider how much liquid to drink each day and which liquids are best for you.       •Exercise regularly. Exercise helps keep your bowels moving well. It may also help you pass bowel movements more often. Ask about the best exercise plan for you.       •Set up a regular toileting schedule at the same time every day. Try to pass a bowel movement when you first wake up, or half an hour after you eat. This may help you control your bowel movements and their timing.      •Try to pass a bowel movement as soon as you get the urge. This will help prevent more constipation.      Follow up with your doctor as directed: You may be referred to a specialist. Write down your questions so you remember to ask them during your visits.      Pressure Injury       A pressure injury is damage to the skin and underlying tissue that results from pressure being applied to an area of the body. It often affects people who must spend a long time in a bed or chair because of a medical condition.  Pressure injuries usually occur:  •Over bony parts of the body, such as the tailbone, shoulders, elbows, hips, heels, spine, ankles, and back of the head.      •Under medical devices that make contact with the body, such as respiratory equipment, stockings, tubes, and splints.      Pressure injuries start as reddened areas on the skin and can lead to pain and an open wound.      What are the causes?    This condition is caused by frequent or constant pressure to an area of the body. Decreased blood flow to the skin can eventually cause the skin tissue to die and break down, causing a wound.      What increases the risk?  You are more likely to develop this condition if you:  •Are in the hospital or an extended care facility.      •Are bedridden or in a wheelchair.    •Have an injury or disease that keeps you from:  •Moving normally.      •Feeling pain or pressure.      •Have a condition that:  •Makes you sleepy or less alert.      •Causes poor blood flow.        •Need to wear a medical device.      •Have poor control of your bladder or bowel functions (incontinence).      •Have poor nutrition (malnutrition).      If you are at risk for pressure injuries, your health care provider may recommend certain types of mattresses, mattress covers, pillows, cushions, or boots to help prevent them. These may include products filled with air, foam, gel, or sand.      What are the signs or symptoms?  Symptoms of this condition depend on the severity of the injury. Symptoms may include:  •Red or dark areas of the skin.      •Pain, warmth, or a change of skin texture.      •Blisters.      •An open wound.        How is this diagnosed?  This condition is diagnosed with a medical history and physical exam. You may also have tests, such as:  •Blood tests.      •Imaging tests.      •Blood flow tests.    Your pressure injury will be staged based on its severity. Staging is based on:  •The depth of the tissue injury, including whether there is exposure of muscle, bone, or tendon.      •The cause of the pressure injury.        How is this treated?  This condition may be treated by:•Relieving or redistributing pressure on your skin. This includes:  •Frequently changing your position.      •Avoiding positions that caused the wound or that can make the wound worse.      •Using specific bed mattresses, chair cushions, or protective boots.      •Moving medical devices from an area of pressure, or placing padding between the skin and the device.      •Using foams, creams, or powders to prevent rubbing (friction) on the skin.        •Keeping your skin clean and dry. This may include using a skin cleanser or skin barrier as told by your health care provider.      •Cleaning your injury and removing any dead tissue from the wound (debridement).      •Placing a bandage (dressing) over your injury.      •Using medicines for pain or to prevent or treat infection.      Surgery may be needed if other treatments are not working or if your injury is very deep.      Follow these instructions at home:    Wound care   •Follow instructions from your health care provider about how to take care of your wound. Make sure you:  •Wash your hands with soap and water before and after you change your bandage (dressing). If soap and water are not available, use hand .      •Change your dressing as told by your health care provider.       •Check your wound every day for signs of infection. Have a caregiver do this for you if you are not able. Check for:  •Redness, swelling, or increased pain.      •More fluid or blood.      •Warmth.      •Pus or a bad smell.        Skin care     •Keep your skin clean and dry. Gently pat your skin dry.      • Do not rub or massage your skin.      •You or a caregiver should check your skin every day for any changes in color or any new blisters or sores (ulcers).      Medicines     •Take over-the-counter and prescription medicines only as told by your health care provider.      •If you were prescribed an antibiotic medicine, take or apply it as told by your health care provider. Do not stop using the antibiotic even if your condition improves.      Reducing and redistributing pressure     • Do not lie or sit in one position for a long time. Move or change position every 1–2 hours, or as told by your health care provider.      •Use pillows or cushions to reduce pressure. Ask your health care provider to recommend cushions or pads for you.        General instructions      •Eat a healthy diet that includes lots of protein.      •Drink enough fluid to keep your urine pale yellow.      •Be as active as you can every day. Ask your health care provider to suggest safe exercises or activities.      • Do not abuse drugs or alcohol.      • Do not use any products that contain nicotine or tobacco, such as cigarettes, e-cigarettes, and chewing tobacco. If you need help quitting, ask your health care provider.      •Keep all follow-up visits as told by your health care provider. This is important.        Contact a health care provider if:  •You have:  •A fever or chills.      •Pain that is not helped by medicine.      •Any changes in skin color.      •New blisters or sores.      •Pus or a bad smell coming from your wound.      •Redness, swelling, or pain around your wound.      •More fluid or blood coming from your wound.        •Your wound does not improve after 1–2 weeks of treatment.        Summary    •A pressure injury is damage to the skin and underlying tissue that results from pressure being applied to an area of the body.      • Do not lie or sit in one position for a long time. Your health care provider may advise you to move or change position every 1–2 hours.      •Follow instructions from your health care provider about how to take care of your wound.      •Keep all follow-up visits as told by your health care provider. This is important.      This information is not intended to replace advice given to you by your health care provider. Make sure you discuss any questions you have with your health care provider.

## 2021-09-21 NOTE — ED PROVIDER NOTE - RECTAL
several large, non-thrombosed external hemorrhoids, no active bleeding, borwn stool and fecal impaction in rectal vault several large, non-thrombosed external hemorrhoids, no active bleeding, brown stool and fecal impaction in rectal vault, stage 2 sacral decubitus ulcer

## 2021-09-21 NOTE — ED PROVIDER NOTE - CLINICAL SUMMARY MEDICAL DECISION MAKING FREE TEXT BOX
Pt with fecal impaction, manually disimpacted, improved. non-thrombosed hemorrhoids. Abd soft and non-tender, stable for dc back top facility

## 2021-09-21 NOTE — ED PROVIDER NOTE - OBJECTIVE STATEMENT
89 year old female with PMH HTN, HLD, DM, CAD and recent admission for hip fracture for which she is currently residing in SNF presents with rectal pain. Pt reports that she had chronic Hx of hemorrhoids and constipation, worse even since she was at the facility. She reports pain at the rectum, no abd pain, and having to strain to have a BM with great difficulty. She reports scant blood with straining. No fevers, chills, dysuria, diarrhea.   Pt interviewed with [employer] [facility] .

## 2021-09-21 NOTE — ED ADULT NURSE NOTE - CHIEF COMPLAINT QUOTE
" I have hemorrhoids and I'm not able to go to the bathroom properly, I also feel like I have to vomit" pt speak Janelle interpret use code [id number]. pt denies any abdominal pain, but co rectal pain also co some bleeding when she has a BM. blood is bright red. pt unable to tell RN when was her last BM, pt coming from [facility]

## 2021-09-21 NOTE — ED ADULT TRIAGE NOTE - CHIEF COMPLAINT QUOTE
" I have hemorrhoids and I'm not able to go to the bathroom properly, I also feel like I have to vomit" pt speak Janelle interpret use cide [id number]. pt denies any abdominal pain, also co some bleeding when she has a BM. blood is bright red. pt unable to tell RN when was her last BM, pt coming from [facility] " I have hemorrhoids and I'm not able to go to the bathroom properly, I also feel like I have to vomit" pt speak Janelle interpret use code [id number]. pt denies any abdominal pain, but co rectal pain also co some bleeding when she has a BM. blood is bright red. pt unable to tell RN when was her last BM, pt coming from [facility]

## 2021-09-21 NOTE — ED PROVIDER NOTE - PROGRESS NOTE DETAILS
As per sign-out from Dr. Ding, patient medically cleared for discharged after address her rectal pain. However, patient is uncomfortable with returning to her current facility. Social work team contacted to assist with discussion with family; possible return vs looking for a new site. Patient family agrees to return to her current facility.

## 2021-09-21 NOTE — ED PROVIDER NOTE - CARE PLAN
Principal Discharge DX:	Fecal impaction  Secondary Diagnosis:	External hemorrhoid  Secondary Diagnosis:	Sacral ulcer   1

## 2021-09-21 NOTE — ED PROVIDER NOTE - NSICDXPASTMEDICALHX_GEN_ALL_CORE_FT
PAST MEDICAL HISTORY:  CAD (coronary artery disease) 2 cardiac stents placed    CAD (coronary artery disease)     Diabetes mellitus     DM (diabetes mellitus) diet-controlled    History of cholelithiasis     HTN (hypertension)     Hypercholesteremia     Hyperlipidemia     Hypertension

## 2021-09-21 NOTE — ED PROVIDER NOTE - PATIENT PORTAL LINK FT
You can access the FollowMyHealth Patient Portal offered by [facility] by registering at the following website: [email or web address]. By joining [facility]’s FollowMyHealth portal, you will also be able to view your health information using other applications (apps) compatible with our system.

## 2021-10-05 PROBLEM — Z00.00 ENCOUNTER FOR PREVENTIVE HEALTH EXAMINATION: Status: ACTIVE | Noted: 2021-10-05

## 2021-10-06 ENCOUNTER — APPOINTMENT (OUTPATIENT)
Dept: ORTHOPEDIC SURGERY | Facility: CLINIC | Age: 86
End: 2021-10-06
Payer: MEDICARE

## 2021-10-06 DIAGNOSIS — Z87.19 PERSONAL HISTORY OF OTHER DISEASES OF THE DIGESTIVE SYSTEM: ICD-10-CM

## 2021-10-06 DIAGNOSIS — Z86.79 PERSONAL HISTORY OF OTHER DISEASES OF THE CIRCULATORY SYSTEM: ICD-10-CM

## 2021-10-06 DIAGNOSIS — Z86.39 PERSONAL HISTORY OF OTHER ENDOCRINE, NUTRITIONAL AND METABOLIC DISEASE: ICD-10-CM

## 2021-10-06 DIAGNOSIS — Z78.9 OTHER SPECIFIED HEALTH STATUS: ICD-10-CM

## 2021-10-06 PROCEDURE — 99024 POSTOP FOLLOW-UP VISIT: CPT

## 2021-10-06 NOTE — HISTORY OF PRESENT ILLNESS
[de-identified] : Patient is an 89-year-old female who presents with family today s/p intramedullary nail, right intertrochanteric hip fracture. Right hip arthrogram. 8/31/21.  she is in a rehab.  Her family member states she has been minimally ambulatory and needs multiple assistance to get out of bed.  Minimal pain.  No x-rays are available as she is unable to stand for x-ray today.

## 2021-10-06 NOTE — DISCUSSION/SUMMARY
[de-identified] : 89-year-old female status post right hip intramedullary nail.  She is having significant weakness now recommend physical therapy to work on range of motion, strengthening, modalities.  No restrictions.  As she has been minimally ambulatory for several months, she has significant muscle atrophy and I would highly recommend extensive physical therapy to work on rebuilding her muscle strength.  Optimizing nutrition is also very important.\par \par Osteopenia and osteoporosis are significant risk factors for fragility fractures. Given the type of fracture that has occurred, I would highly recommend that the patient followup with their primary care physician to discuss treatment for low bone mineral density. We discussed the benefits of these medications frequently far outweigh the small risks. Their primary care physician can call the office with any specific questions or concerns.\par \par No orthopedic trauma intervention is required but we will help facilitate future care as needed. The patient may follow up as needed.\par \par The patient 's family was given the opportunity to ask questions and all questions were answered to their satisfaction.\par \par Henry Leach MD\par Orthopaedic Trauma Surgeon\par [facility]\par [facility] [place]\par Director Orthopaedic Trauma, [facility] [place]\par \par \par \par

## 2021-10-06 NOTE — REASON FOR VISIT
[Initial Visit] : an initial visit for [Family Member] : family member [FreeTextEntry2] : s/p intramedullary nail, right intertrochanteric hip fracture. Right hip arthrogram. 8/31/21

## 2021-11-27 NOTE — DISCHARGE NOTE NURSING/CASE MANAGEMENT/SOCIAL WORK - DATE OF LAST VACCINATION
[address] [facility] Adult  Hospitalist Group                                                                                          Hospitalist Progress Note  Mike Nolasco MD  Answering service: [phone] from in house phone        Date of Service:  2021  NAME:  Lola Ramesh  :  1960  MRN:  [id number]      Admission Summary:   70-year-old male with a past medical history of CAD status post CABG on dAPT, HFpEF,  DM 2, and CKD V who was admitted on  for GI bleed, and overload secondary to cardiorenal syndrome. He had been hospitalized at [facility] prior to this for acute on chronic heart failure, and started to develop bilateral lower extremity edema with melena upon discharge. He underwent EGD which showed nonbleeding erosive gastritis, and was started on PPI. Nephrology, and cardiology were consulted for volume management, and he was diuresed with IV Lasix. On , patient sustained PEA arrest.  After 12 minutes of CPR, ROSC was achieved, and he was intubated and transferred to the ICU. His volume status did not improve with IV Lasix, and Alexandre catheter was placed with plan for 3 days of dialysis. He was extubated, and neurology was consulted due altered mental status suspected secondary to anoxic brain injury, but this improved, and his mental status ultimately returned back to baseline with no imaging or intervention recommended. Transferred out of ICU on 21    Interval history / Subjective:     Pt seen and examined    Receiving dialysis during my eval.Mild headache.      Assessment & Plan:     #SEVERIANO on CKD 4, now on HD due to ATN from PEA arrest  #HFpEF  on  Bumex, TUMS, retacrit, and losartan  Cardiology, nephrology on board, appreciate recommendations  Avoid renal toxins, and hypotension.  --HD MWF  --c/w HD , need outpatient HD unit  --Perm-a-cath placed on     Headache/Nausea/Vomiting-improved  - Hypertensive urgency-resistant HTN  -BP high despite Clonidine 0.2mg TID, Cozaar 100mg, Imdur 120mg daily, Lopressor, Hydralazine , Procardia XL and Prazosin   -discussed with nephrologist today- meds adjusted -increased doses today  -HR borderline,hold metoprolol      S/p PEA Arrest suspect from respiratory distress  --s/p intubation/extubation      #Erosive gastritis  Presented with melena, and normocytic anemia with hemoglobin 7-8  EGD on 11/17 with nonbleeding erosive gastritis  Continue PPI per gastroenterology  -now on ASA monotherapy    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  - down for 12 minutes  - seen by Neurology  - supportive care     #DM II  -lispro ISS     #CAD s/p CABG  -continue metoprolol, crestor, imdur, and ASA     #Depression  -continue zoloft     #MSK pain   -s/p CPR  -has tramadol,  -Lidocaine patch added     FEN: caridac  dvt ppx: SCD  MPOA: daughter  Code: Full    Care Plan discussed with: Patient/Family and Nurse  Anticipated Disposition: SAH/Rehab  Anticipated Discharge: Greater than 48 hours, IPR pending, needs [facility] Problems  Date Reviewed: 8/7/2020          Codes Class Noted POA    Severe protein-calorie malnutrition ([facility] [address] ICD-10-CM: V02  ICD-9-CM: 839  11/23/2021 Yes        * (Principal) CHF exacerbation ([facility] [address] ICD-10-CM: I50.9  ICD-9-CM: 428.0  11/14/2021 Unknown                Review of Systems:   A comprehensive review of systems was negative except for that written in the HPI. Vital Signs:    Last 24hrs VS reviewed since prior progress note.  Most recent are:  Visit Vitals  BP (!) 197/62   Pulse 73   Temp 98.4 °F (36.9 °C)   Resp 18   Ht 5' 9\" (1.753 m)   Wt 77.1 kg (169 lb 15.6 oz)   SpO2 98%   BMI 25.10 kg/m²         Intake/Output Summary (Last 24 hours) at 11/26/2021 2233  Last data filed at 11/26/2021 1814  Gross per 24 hour   Intake 960 ml   Output 850 ml   Net 110 ml        Physical Examination:     I had a face to face encounter with this patient and independently examined them on 11/26/2021 as outlined below:          Constitutional:  No acute distress, cooperative, pleasant    ENT:  Oral mucosa moist, EOMI,anicteric sclera    Resp:  CTA bilaterally. CV:  Regular rhythm, normal rate, S1,S 2 wnl    GI:  Soft, non distended, non tender, normoactive bowel sounds,    Musculoskeletal:  trace edema    Neurologic:  Moves all extremities. AAO            Data Review:    Review and/or order of clinical lab test  Review and/or order of tests in the medicine section of [facility]      Labs:     No results for input(s): WBC, HGB, HCT, PLT, HGBEXT, HCTEXT, PLTEXT, HGBEXT, HCTEXT, PLTEXT in the last 72 hours. Recent Labs     11/26/21  0125 11/25/21  0236 11/24/21  0330   * 133* 132*   K 3.4* 3.6 3.8   CL 99 99 100   CO2 27 27 23   BUN 23* 19 31*   CREA 2.81* 2.37* 3.11*   * 143* 173*   CA 7.9* 8.5 7.9*   PHOS  --   --  2.9     Recent Labs     11/24/21  0330   ALB 2.6*     No results for input(s): INR, PTP, APTT, INREXT, INREXT in the last 72 hours. No results for input(s): FE, TIBC, PSAT, FERR in the last 72 hours. No results found for: FOL, RBCF   No results for input(s): PH, PCO2, PO2 in the last 72 hours. No results for input(s): CPK, CKNDX, TROIQ in the last 72 hours.     No lab exists for component: CPKMB  Lab Results   Component Value Date/Time    Cholesterol, total 176 11/22/2019 09:36 AM    HDL Cholesterol 45 11/22/2019 09:36 AM    LDL, calculated 112 (H) 11/22/2019 09:36 AM    Triglyceride 94 11/22/2019 09:36 AM     Lab Results   Component Value Date/Time    Glucose (POC) 242 (H) 11/26/2021 09:56 PM    Glucose (POC) 142 (H) 11/26/2021 04:47 PM    Glucose (POC) 160 (H) 11/26/2021 11:52 AM    Glucose (POC) 160 (H) 11/26/2021 06:41 AM    Glucose (POC) 195 (H) 11/25/2021 09:56 PM     Lab Results   Component Value Date/Time    Color YELLOW/STRAW 11/14/2021 10:31 AM    Appearance CLEAR 11/14/2021 10:31 AM    Specific gravity 1.019 11/14/2021 10:31 AM    pH (UA) 5.5 11/14/2021 10:31 AM Protein >300 (A) 11/14/2021 10:31 AM    Glucose 500 (A) 11/14/2021 10:31 AM    Ketone Negative 11/14/2021 10:31 AM    Bilirubin Negative 11/14/2021 10:31 AM    Urobilinogen 0.2 11/14/2021 10:31 AM    Nitrites Negative 11/14/2021 10:31 AM    Leukocyte Esterase Negative 11/14/2021 10:31 AM    Epithelial cells FEW 11/14/2021 10:31 AM    Bacteria Negative 11/14/2021 10:31 AM    WBC 0-4 11/14/2021 10:31 AM    RBC 0-5 11/14/2021 10:31 AM         Medications Reviewed:     Current Facility-Administered Medications   Medication Dose Route Frequency    bumetanide (BUMEX) tablet 2 mg  2 mg Oral BID    NIFEdipine ER (PROCARDIA XL) tablet 60 mg  60 mg Oral BID    morphine injection 2 mg  2 mg IntraVENous Q3H PRN    isosorbide mononitrate ER (IMDUR) tablet 120 mg  120 mg Oral DAILY    heparin (porcine) 1,000 unit/mL injection 1,900 Units  1,900 Units Hemodialysis DIALYSIS PRN    heparin (porcine) 1,000 unit/mL injection 1,900 Units  1,900 Units Hemodialysis DIALYSIS PRN    lidocaine 4 % patch 1 Patch  1 Patch TransDERmal Q24H    prazosin (MINIPRESS) capsule 2 mg  2 mg Oral BID    labetaloL (NORMODYNE;TRANDATE) injection 15 mg  15 mg IntraVENous Q4H PRN    hydrALAZINE (APRESOLINE) tablet 100 mg  100 mg Oral TID    niCARdipine (CARDENE) 25 mg in 0.9% sodium chloride 250 mL infusion  0-15 mg/hr IntraVENous TITRATE    cloNIDine HCL (CATAPRES) tablet 0.2 mg  0.2 mg Oral TID    sucralfate (CARAFATE) tablet 1 g  1 g Oral AC&HS    losartan (COZAAR) tablet 100 mg  100 mg Oral QHS    [Held by provider] gabapentin (NEURONTIN) capsule 100 mg  100 mg Oral TID    insulin lispro (HUMALOG) injection   SubCUTAneous AC&HS    hydrALAZINE (APRESOLINE) 20 mg/mL injection 10-20 mg  10-20 mg IntraVENous Q6H PRN    pantoprazole (PROTONIX) 40 mg in 0.9% sodium chloride 10 mL injection  40 mg IntraVENous DAILY    [Held by provider] metoprolol tartrate (LOPRESSOR) tablet 25 mg  25 mg Oral Q12H    aspirin chewable tablet 81 mg  81 mg Oral DAILY    polyethylene glycol (MIRALAX) packet 17 g  17 g Oral BID    [Held by provider] eplerenone (INSPRA) tablet 25 mg  25 mg Oral DAILY    calcium carbonate (TUMS) chewable tablet 200 mg [elemental]  200 mg Oral TID WITH MEALS    sodium chloride (NS) flush 5-40 mL  5-40 mL IntraVENous Q8H    sodium chloride (NS) flush 5-40 mL  5-40 mL IntraVENous PRN    epoetin ericka-epbx (RETACRIT) injection 20,000 Units  20,000 Units SubCUTAneous Q MON, WED & FRI    butalbital-acetaminophen-caffeine (FIORICET, ESGIC) -40 mg per tablet 1 Tablet  1 Tablet Oral Q4H PRN    ondansetron (ZOFRAN) injection 4 mg  4 mg IntraVENous Q4H PRN    busPIRone (BUSPAR) tablet 10 mg  10 mg Oral BID    rosuvastatin (CRESTOR) tablet 10 mg  10 mg Oral QHS    sertraline (ZOLOFT) tablet 200 mg  200 mg Oral DAILY    traMADoL (ULTRAM) tablet 50 mg  50 mg Oral Q6H PRN    [Held by provider] traZODone (DESYREL) tablet 50 mg  50 mg Oral QHS    acetaminophen (TYLENOL) tablet 650 mg  650 mg Oral Q6H PRN    glucose chewable tablet 16 g  4 Tablet Oral PRN    dextrose (D50W) injection syrg 12.5-25 g  25-50 mL IntraVENous PRN    glucagon (GLUCAGEN) injection 1 mg  1 mg IntraMUSCular PRN    [Held by provider] insulin glargine (LANTUS) injection 10 Units  10 Units SubCUTAneous DAILY     ______________________________________________________________________  EXPECTED LENGTH OF STAY: 3d 19h  ACTUAL LENGTH OF STAY:          12                 Vickie Morales MD 12-Feb-2021

## 2022-01-12 ENCOUNTER — APPOINTMENT (OUTPATIENT)
Dept: ORTHOPEDIC SURGERY | Facility: CLINIC | Age: 87
End: 2022-01-12

## 2022-01-15 NOTE — ASU PATIENT PROFILE, ADULT - ARRIVAL TIME
Patient calling requesting a refill on an antibiotic he has been taking for the last 3 days for wrist cellulitis  He states that he would like another prescription called in because he doesn't feel that his symptoms have fully resolved  On call provider messaged  He will call in a refill for a prescription for patient  Patient notified that script will be called in for him and he states "I don't want to go to Urgent Care and expose myself to these people"    Explained that we offer everyone the resources that are available on a weekend  07:15

## 2022-01-18 ENCOUNTER — APPOINTMENT (OUTPATIENT)
Dept: ORTHOPEDIC SURGERY | Facility: CLINIC | Age: 87
End: 2022-01-18
Payer: MEDICARE

## 2022-01-18 VITALS
HEIGHT: 60 IN | WEIGHT: 145 LBS | SYSTOLIC BLOOD PRESSURE: 131 MMHG | BODY MASS INDEX: 28.47 KG/M2 | TEMPERATURE: 98.1 F | HEART RATE: 56 BPM | DIASTOLIC BLOOD PRESSURE: 70 MMHG

## 2022-01-18 DIAGNOSIS — M25.551 PAIN IN RIGHT HIP: ICD-10-CM

## 2022-01-18 DIAGNOSIS — M25.522 PAIN IN LEFT ELBOW: ICD-10-CM

## 2022-01-18 DIAGNOSIS — S72.141D DISPLACED INTERTROCHANTERIC FRACTURE OF RIGHT FEMUR, SUBSEQUENT ENCOUNTER FOR CLOSED FRACTURE WITH ROUTINE HEALING: ICD-10-CM

## 2022-01-18 PROCEDURE — 73502 X-RAY EXAM HIP UNI 2-3 VIEWS: CPT | Mod: RT

## 2022-01-18 PROCEDURE — 99214 OFFICE O/P EST MOD 30 MIN: CPT

## 2022-01-18 PROCEDURE — 73080 X-RAY EXAM OF ELBOW: CPT | Mod: LT

## 2022-01-19 PROBLEM — S72.141D INTERTROCHANTERIC FRACTURE OF RIGHT HIP, CLOSED, WITH ROUTINE HEALING, SUBSEQUENT ENCOUNTER: Status: ACTIVE | Noted: 2021-10-06

## 2022-01-19 NOTE — REASON FOR VISIT
[Follow-Up Visit] : a follow-up visit for [Family Member] : family member [FreeTextEntry2] : left elbow, right hip follow up

## 2022-01-19 NOTE — DISCUSSION/SUMMARY
[de-identified] : 89-year-old female status post right hip intramedullary nail.  She is still having significant weakness so I would continue to recommend physical therapy to work on range of motion, strengthening, modalities.  No restrictions.  As she has been minimally ambulatory for several months, she has significant muscle atrophy and I would highly recommend extensive physical therapy to work on rebuilding her muscle strength.  Optimizing nutrition is also very important.\par \par Osteopenia and osteoporosis are significant risk factors for fragility fractures. Given the type of fracture that has occurred, I would highly recommend that the patient followup with their primary care physician to discuss treatment for low bone mineral density. We discussed the benefits of these medications frequently far outweigh the small risks. Their primary care physician can call the office with any specific questions or concerns.\par \par No orthopedic trauma intervention is required but we will help facilitate future care as needed. The patient may follow up as needed.\par \par The patient 's family was given the opportunity to ask questions and all questions were answered to their satisfaction.\par \par Henry Leach MD\par Orthopaedic Trauma Surgeon\par [facility]\par [facility] [place]\par Director Orthopaedic Trauma, [facility]\par \par \par \par

## 2022-01-19 NOTE — PHYSICAL EXAM
[de-identified] : Physical Exam:\par General: Well appearing, no acute distress, A&O\par Neurologic: A&Ox3, No focal deficits\par Head: NCAT without abrasions, lacerations, or ecchymosis to head, face, or scalp\par Respiratory: Equal chest wall expansion bilaterally, no accessory muscle use\par Lymphatic: No lymphadenopathy palpated\par Skin: Warm and dry\par Psychiatric: Normal mood and affect\par \par LUE:\par SILT r/m/u\par Fires AIN/PIN/ulnar\par 2+ radial pulse\par brisk capillary refill\par Range of motion from 10 to 110 degrees\par \par RLE:\par SILT s/s/sp/dp/t\par Fires EHL/FHL/GS/TA\par 2+ DP/PT pulse\par brisk capillary refill [de-identified] : Plain films of the right hip and left elbow were obtained today.  The right hip shows signs of continued healing.  The left elbow shows osteoarthritic changes and sequelae of an old but healed distal humerus fracture

## 2022-01-19 NOTE — HISTORY OF PRESENT ILLNESS
[de-identified] : Patient is an 89-year-old female who presents with family today s/p intramedullary nail, right intertrochanteric hip fracture. Right hip arthrogram. 8/31/21.  she is in a rehab.  Her family member states she has been minimally ambulatory and needs multiple assistance to get out of bed.  Minimal pain.  No x-rays are available as she is unable to stand for x-ray today.  [Bending] : worsened by bending [Lifting] : worsened by lifting [Weight Bearing] : worsened by weight bearing [Recumbency] : relieved by recumbency [Rest] : relieved by rest [de-identified] : 0

## 2022-02-18 NOTE — PROGRESS NOTE ADULT - PROBLEM SELECTOR PROBLEM 9
Courtesy call made to remind pt. Of her appointment with Dr. Small the rheumatologist in [place].  
Type 2 diabetes mellitus

## 2022-04-25 NOTE — ED PROVIDER NOTE - SKIN NEGATIVE STATEMENT, MLM
[facility]. Chart & [facility]  reviewed. Based on refills should not need refills for another week. Will send in 10 tabs    Last visit with me:  12/1/2021   Last refilled on: 3/16/22 - #45.       Future Appointments   Date Time Provider Shaun Yao   11/7/2022 10:00 AM Charli Gomez MD [facility] JAILYN CADE AMB        Last PDMP Ceh Keene as Reviewed:  Review User Review Instant Review Result   ABILIO ROSA 4/25/2022  4:19 PM Reviewed PDMP [1] no abrasions, no jaundice, no lesions, no pruritis, and no rashes.

## 2022-05-06 NOTE — PATIENT PROFILE ADULT - ARE SIGNIFICANT INDICATORS COMPLETE.
Patient Active Problem List   Diagnosis   • History of  delivery   • History of  delivery, currently pregnant   • Thrombocytopenia (CMS/HCC)   • Disorder of gallbladder   • Other atopic dermatitis   • Vitamin D deficiency   • Reactive airway disease without complication   • Bilateral carpal tunnel syndrome   • Obesity (BMI 30.0-34.9)   • Gestational thrombocytopenia (CMS/HCC)   • Obesity complicating pregnancy   • Diet controlled gestational diabetes mellitus (GDM) in third trimester       Patient follow up, denies problems     BS reveiwed; fastings <90; pp one am elevation; lunch 2 120s. ; dinner had 4/7 elevated though; pt states is eatig chocolate etc with dinner and wants to work on diet for dinner numbers before any meds.      Reports good Fm, denies ctxs/VB/LOF.      Visit Vitals  /60   Pulse 97   Wt 82.6 kg (182 lb)   LMP 09/15/2021   SpO2 98%   BMI 35.54 kg/m²      General Appearance: Well developed, well nourished.  Patient is alert and oriented times three.    Abdomen gravid, soft, nontender  Fundal Height cw dates  FHT's (fetal heart tones) NST reactive.   91%ile on u/s 5-4.    Assessment and Plan:  1. 31 year old  at 33w2d   2. GDM; so far diet controlled, though dinners look elevated pattern; will work on diet more and consider for po meds  3. Macrosomia suspected; may be related to GDM.    4. Hx of low plts; continue monthly check sconsider weekly near end  5. Planning ERCS/BTL 39 weeks  6. Return to clinic in 1 weeks     Shannan MIGUEL     Yes

## 2022-06-01 NOTE — ED PROVIDER NOTE - MDM ORDERS SUBMITTED SELECTION
Noted. Appointment notes have been updated to make sure the brilinta is addressed.     FYI to Dr. Irby's team.    Labs/Imaging Studies

## 2022-08-25 NOTE — CHART NOTE - NSCHARTNOTEFT_GEN_A_CORE
Preop Phone Call:  - Able to Reach Patient:  yes  - Info given to patient's daughter Ilene Galarza  -  and allergies confirmed: yes  - Medication Information Given: yes  - Medications To Take Imdur and Losartan   - Medications To Hold: Lasix and Metformin ( instructed by the office)	  - Arrival Time: 0630  - NPO after: may take some crackers with AM meds	  - Understanding of Information Verbalized: yes  will have a  over the age of 18  for d/c home  - Understanding of possible overnight stay if stent is placed: yes

## 2022-08-25 NOTE — H&P ADULT - PROBLEM SELECTOR PLAN 1
with abnormal stress test  LHC and possible PCI with abnormal stress test  LHC and possible PCI  -Consent obtained for cardiac catheterization w/ coronary angiogram and possible stent placement by Dr Man. Pt's daughter is competent, has capacity, and understands risks and benefits of procedure. Risks and benefits discussed. Risk discussed included, but not limited to MI, stroke, mortality, major bleeding, arrythmia, or infection. All questions answered

## 2022-08-25 NOTE — H&P ADULT - ASSESSMENT
89 year old female with PMHx HTN, DM, HLD, CAD/PCI presented to cardiologist with dyspnea with minimal exertion. Lexiscan nuclear stress test suggestive of inferial wall ischemia.   Referred for cardiac cath and possible PCI  COVID 19 PCR: negative  ASA:  Bleeding  Risk score:  Creatinine:  GFR:    Dylan 89 year old female with PMHx HTN, DM, HLD, CAD/PCI presented to cardiologist with dyspnea with minimal exertion. Lexiscan nuclear stress test suggestive of inferial wall ischemia.   Referred for cardiac cath and possible PCI  COVID 19 PCR: negative    ASA:II  Bleeding  Risk score:  Creatinine:  GFR:    Dylan 89 year old female with PMHx HTN, DM, HLD, CAD/PCI presented to cardiologist with dyspnea with minimal exertion. Lexiscan nuclear stress test suggestive of inferial wall ischemia.   Referred for cardiac cath and possible PCI  COVID 19 PCR: negative  Daughter at bedside; translated and signed consent    ASA:II  Bleeding  Risk score:1.5%  Creatinine:1.01  GFR:69    Dylan 8 points Continue medications as prescribed

## 2022-08-25 NOTE — H&P ADULT - HISTORY OF PRESENT ILLNESS
89 year old female with PMHx HTN, DM, HLD, CAD/PCI presented to cardiologist with dyspnea with minimal exertion. Lexiscan nuclear stress test suggestive of inferial wall ischemia.   Referred for cardiac cath and possible PCI  COVID 19 PCR: negative

## 2022-08-25 NOTE — H&P ADULT - NSHPLABSRESULTS_GEN_ALL_CORE
2/10/22 Echo: EF 55-60%  6/24/22 Lexiscan MPI: Reversible defect in the inferior lateral wall consistent with ischemia 2/10/22 Echo: EF 55-60%  6/24/22 Lexiscan MPI: Reversible defect in the inferior lateral wall consistent with ischemia  08-26 @ 07:45    143 | 110 | 32  /9.2 | -- | --  _______________________/  4.5 | 27 | 1.01 \110                          \

## 2022-08-25 NOTE — H&P ADULT - NSHPREVIEWOFSYSTEMS_GEN_ALL_CORE
REVIEW OF SYSTEMS:    CONSTITUTIONAL: No weakness, fevers or chills  EYES/ENT: No visual changes;  No vertigo or throat pain   NECK: No pain or stiffness  RESPIRATORY: No cough, wheezing, hemoptysis; + shortness of breath on minimal exertion  CARDIOVASCULAR: No chest pain or palpitations  GASTROINTESTINAL: No abdominal or epigastric pain. No nausea, vomiting, or hematemesis; No diarrhea or constipation. No melena or hematochezia.  GENITOURINARY: No dysuria, frequency or hematuria  NEUROLOGICAL: No numbness or weakness  SKIN: No itching, burning, rashes, or lesions   All other review of systems is negative unless indicated above.

## 2022-08-25 NOTE — H&P ADULT - NSHPPHYSICALEXAM_GEN_ALL_CORE
PHYSICAL EXAM:    T(C): 36.8 (26 Aug 2022 07:55), Max: 36.8 (26 Aug 2022 07:55)  T(F): 98.2 (26 Aug 2022 07:55), Max: 98.2 (26 Aug 2022 07:55)  HR: 11 (26 Aug 2022 07:55) (11 - 11)  BP: 149/59 (26 Aug 2022 07:55) (149/59 - 149/59)  RR: 16 (26 Aug 2022 07:55) (16 - 16)  SpO2: 100% (26 Aug 2022 07:55) (100% - 100%)        Constitutional: NAD, well-groomed, well-developed  Neuro: Alert and oriented x 3 Gait steady Speech clear No focal deficits  Neck: No JVD No bruit  Respiratory: CTAB  Cardiovascular: S1 and S2, RRR,   Gastrointestinal: BS+, soft, NT/ND  Extremities: No clubbing cyanosis or edema No varicosities  Vascular: 2+ peripheral pulses  Psychiatric: Normal mood, normal affect  Musculoskeletal: 5/5 strength b/l upper and lower extremities

## 2022-08-26 ENCOUNTER — OUTPATIENT (OUTPATIENT)
Dept: OUTPATIENT SERVICES | Facility: HOSPITAL | Age: 87
LOS: 1 days | Discharge: HOME | End: 2022-08-26
Payer: MEDICARE

## 2022-08-26 VITALS
RESPIRATION RATE: 16 BRPM | TEMPERATURE: 98 F | WEIGHT: 145.95 LBS | HEART RATE: 45 BPM | OXYGEN SATURATION: 100 % | HEIGHT: 59 IN | SYSTOLIC BLOOD PRESSURE: 149 MMHG | DIASTOLIC BLOOD PRESSURE: 59 MMHG

## 2022-08-26 VITALS
DIASTOLIC BLOOD PRESSURE: 69 MMHG | SYSTOLIC BLOOD PRESSURE: 137 MMHG | OXYGEN SATURATION: 100 % | RESPIRATION RATE: 16 BRPM | HEART RATE: 55 BPM

## 2022-08-26 DIAGNOSIS — I25.10 ATHEROSCLEROTIC HEART DISEASE OF NATIVE CORONARY ARTERY WITHOUT ANGINA PECTORIS: ICD-10-CM

## 2022-08-26 DIAGNOSIS — H26.9 UNSPECIFIED CATARACT: Chronic | ICD-10-CM

## 2022-08-26 DIAGNOSIS — Z95.5 PRESENCE OF CORONARY ANGIOPLASTY IMPLANT AND GRAFT: Chronic | ICD-10-CM

## 2022-08-26 LAB
ANION GAP SERPL CALC-SCNC: 6 MMOL/L — SIGNIFICANT CHANGE UP (ref 5–17)
BUN SERPL-MCNC: 32 MG/DL — HIGH (ref 7–23)
CALCIUM SERPL-MCNC: 9.2 MG/DL — SIGNIFICANT CHANGE UP (ref 8.5–10.1)
CHLORIDE SERPL-SCNC: 110 MMOL/L — HIGH (ref 96–108)
CO2 SERPL-SCNC: 27 MMOL/L — SIGNIFICANT CHANGE UP (ref 22–31)
CREAT SERPL-MCNC: 1.01 MG/DL — SIGNIFICANT CHANGE UP (ref 0.5–1.3)
EGFR: 53 ML/MIN/1.73M2 — LOW
GLUCOSE SERPL-MCNC: 110 MG/DL — HIGH (ref 70–99)
POTASSIUM SERPL-MCNC: 4.5 MMOL/L — SIGNIFICANT CHANGE UP (ref 3.5–5.3)
POTASSIUM SERPL-SCNC: 4.5 MMOL/L — SIGNIFICANT CHANGE UP (ref 3.5–5.3)
SODIUM SERPL-SCNC: 143 MMOL/L — SIGNIFICANT CHANGE UP (ref 135–145)

## 2022-08-26 PROCEDURE — 93005 ELECTROCARDIOGRAM TRACING: CPT | Mod: XU

## 2022-08-26 PROCEDURE — 36415 COLL VENOUS BLD VENIPUNCTURE: CPT

## 2022-08-26 PROCEDURE — C1887: CPT

## 2022-08-26 PROCEDURE — C1769: CPT

## 2022-08-26 PROCEDURE — 93458 L HRT ARTERY/VENTRICLE ANGIO: CPT

## 2022-08-26 PROCEDURE — C1760: CPT

## 2022-08-26 PROCEDURE — C1894: CPT

## 2022-08-26 PROCEDURE — 80048 BASIC METABOLIC PNL TOTAL CA: CPT

## 2022-08-26 PROCEDURE — 93010 ELECTROCARDIOGRAM REPORT: CPT

## 2022-08-26 RX ORDER — METFORMIN HYDROCHLORIDE 850 MG/1
1 TABLET ORAL
Qty: 0 | Refills: 0 | DISCHARGE

## 2022-08-26 RX ORDER — FUROSEMIDE 40 MG
1 TABLET ORAL
Qty: 0 | Refills: 0 | DISCHARGE

## 2022-08-26 RX ORDER — SODIUM CHLORIDE 9 MG/ML
1000 INJECTION INTRAMUSCULAR; INTRAVENOUS; SUBCUTANEOUS
Refills: 0 | Status: DISCONTINUED | OUTPATIENT
Start: 2022-08-26 | End: 2022-08-26

## 2022-08-26 RX ORDER — FAMOTIDINE 10 MG/ML
1 INJECTION INTRAVENOUS
Qty: 0 | Refills: 0 | DISCHARGE

## 2022-08-26 RX ORDER — LOSARTAN POTASSIUM 100 MG/1
1 TABLET, FILM COATED ORAL
Qty: 0 | Refills: 0 | DISCHARGE

## 2022-08-26 RX ORDER — SENNA PLUS 8.6 MG/1
2 TABLET ORAL
Qty: 0 | Refills: 0 | DISCHARGE

## 2022-08-26 RX ORDER — LANOLIN ALCOHOL/MO/W.PET/CERES
1 CREAM (GRAM) TOPICAL
Qty: 0 | Refills: 0 | DISCHARGE

## 2022-08-26 RX ORDER — OMEPRAZOLE 10 MG/1
1 CAPSULE, DELAYED RELEASE ORAL
Qty: 0 | Refills: 0 | DISCHARGE

## 2022-08-26 RX ORDER — LOSARTAN POTASSIUM 100 MG/1
0.5 TABLET, FILM COATED ORAL
Qty: 0 | Refills: 0 | DISCHARGE

## 2022-08-26 RX ADMIN — SODIUM CHLORIDE 132 MILLILITER(S): 9 INJECTION INTRAMUSCULAR; INTRAVENOUS; SUBCUTANEOUS at 10:12

## 2022-08-26 RX ADMIN — SODIUM CHLORIDE 250 MILLILITER(S): 9 INJECTION INTRAMUSCULAR; INTRAVENOUS; SUBCUTANEOUS at 08:09

## 2022-08-26 NOTE — ASU PATIENT PROFILE, ADULT - VISION (WITH CORRECTIVE LENSES IF THE PATIENT USUALLY WEARS THEM):
glasses with patient/Partially impaired: cannot see medication labels or newsprint, but can see obstacles in path, and the surrounding layout; can count fingers at arm's length

## 2022-08-26 NOTE — PACU DISCHARGE NOTE - COMMENTS
Patient s/p LHC via RFA. Mynx to RFA. No s/s of bleeding or hematoma. RLE warm and mobile. Patient denies pain. VS Stable. SL removed. Discharge instructions reviewed with patient and her daughter and they verbalize understanding. Patient pending transport to [facility] at this time for discharge home

## 2022-08-26 NOTE — ASU PATIENT PROFILE, ADULT - LANGUAGE ASSISTANCE NEEDED
daughter at bedside to translate/No-Patient/Caregiver offered and refused free interpretation services.

## 2022-08-26 NOTE — PROGRESS NOTE ADULT - SUBJECTIVE AND OBJECTIVE BOX
s/p LHC revealing non obstructive CAD   Denies chest pain, shortness of breath, dizziness or palpitations at this time    PHYSICAL EXAM:  Constitutional: NA  Neuro: Alert and oriented x 3 Gait steady Speech clear No focal deficits  Neck: No JVD No bruit  Respiratory: CTAB  Cardiovascular: S1 and S2, RRR,   Gastrointestinal: BS+, soft, NT/ND  Extremities: No clubbing cyanosis or edema No varicosities  Vascular: 2+ peripheral pulses  Psychiatric: Normal mood, normal affect  Musculoskeletal: 5/5 strength b/l upper and lower extremities  Right groin procedure site CDI.  no bleeding, no hematoma, site soft, non tender, positive pedal pulses bilaterally    HPI:  89 year old female with PMHx HTN, DM, HLD, CAD/PCI presented to cardiologist with dyspnea with minimal exertion. Lexiscan nuclear stress test suggestive of inferial wall ischemia.   Referred for cardiac cath and possible PCI  COVID 19 PCR: negative (25 Aug 2022 15:45)     s/p LHC revealing non obstructive CAD    -iv hydration for ANDREIA prevention  -encourage PO fluids  -plan of care discussed with patient, family and MD  -Hold metformin for 48 hours  -d/c after bedrest if stable  -post procedure and d/c instructions reviewed  -follow up with MD in 1-2 weeks  -Discussed therapeutic lifestyle changes to reduce risk factors such as following a cardiac diet, weight loss, maintaining a healthy weight, exercise, smoking cessation, medication compliance, and regular follow-up  with MD

## 2022-08-26 NOTE — ASU PATIENT PROFILE, ADULT - FALL HARM RISK - HARM RISK INTERVENTIONS

## 2022-08-30 DIAGNOSIS — I10 ESSENTIAL (PRIMARY) HYPERTENSION: ICD-10-CM

## 2022-08-30 DIAGNOSIS — I25.118 ATHEROSCLEROTIC HEART DISEASE OF NATIVE CORONARY ARTERY WITH OTHER FORMS OF ANGINA PECTORIS: ICD-10-CM

## 2022-08-30 DIAGNOSIS — Z95.5 PRESENCE OF CORONARY ANGIOPLASTY IMPLANT AND GRAFT: ICD-10-CM

## 2022-10-07 NOTE — H&P ADULT - NSHPLANGLIMITEDENGLISH_GEN_A_CORE
Called [facility] infusion center set up apt for Hilarie Carrel on 10/13/22  I called the patient and told him its 10/13/22 at 11:30  I also did a prior authorization for reclast will wait to here back 
Yes

## 2022-10-22 ENCOUNTER — EMERGENCY (EMERGENCY)
Facility: HOSPITAL | Age: 87
LOS: 1 days | Discharge: HOME | End: 2022-10-22
Attending: EMERGENCY MEDICINE | Admitting: EMERGENCY MEDICINE
Payer: MEDICARE

## 2022-10-22 VITALS
SYSTOLIC BLOOD PRESSURE: 165 MMHG | TEMPERATURE: 98 F | DIASTOLIC BLOOD PRESSURE: 73 MMHG | OXYGEN SATURATION: 98 % | RESPIRATION RATE: 18 BRPM | HEART RATE: 62 BPM

## 2022-10-22 VITALS
TEMPERATURE: 100 F | WEIGHT: 154.1 LBS | DIASTOLIC BLOOD PRESSURE: 51 MMHG | OXYGEN SATURATION: 96 % | HEIGHT: 59 IN | HEART RATE: 60 BPM | RESPIRATION RATE: 20 BRPM | SYSTOLIC BLOOD PRESSURE: 99 MMHG

## 2022-10-22 DIAGNOSIS — Z95.5 PRESENCE OF CORONARY ANGIOPLASTY IMPLANT AND GRAFT: Chronic | ICD-10-CM

## 2022-10-22 DIAGNOSIS — H26.9 UNSPECIFIED CATARACT: Chronic | ICD-10-CM

## 2022-10-22 LAB
ALBUMIN SERPL ELPH-MCNC: 3.4 G/DL — SIGNIFICANT CHANGE UP (ref 3.3–5)
ALP SERPL-CCNC: 76 U/L — SIGNIFICANT CHANGE UP (ref 40–120)
ALT FLD-CCNC: 20 U/L — SIGNIFICANT CHANGE UP (ref 12–78)
ANION GAP SERPL CALC-SCNC: 4 MMOL/L — LOW (ref 5–17)
AST SERPL-CCNC: 23 U/L — SIGNIFICANT CHANGE UP (ref 15–37)
BASOPHILS # BLD AUTO: 0.04 K/UL — SIGNIFICANT CHANGE UP (ref 0–0.2)
BASOPHILS NFR BLD AUTO: 0.7 % — SIGNIFICANT CHANGE UP (ref 0–2)
BILIRUB SERPL-MCNC: 0.2 MG/DL — SIGNIFICANT CHANGE UP (ref 0.2–1.2)
BUN SERPL-MCNC: 27 MG/DL — HIGH (ref 7–23)
CALCIUM SERPL-MCNC: 8.8 MG/DL — SIGNIFICANT CHANGE UP (ref 8.5–10.1)
CHLORIDE SERPL-SCNC: 108 MMOL/L — SIGNIFICANT CHANGE UP (ref 96–108)
CO2 SERPL-SCNC: 32 MMOL/L — HIGH (ref 22–31)
CREAT SERPL-MCNC: 1.1 MG/DL — SIGNIFICANT CHANGE UP (ref 0.5–1.3)
EGFR: 48 ML/MIN/1.73M2 — LOW
EOSINOPHIL # BLD AUTO: 0.22 K/UL — SIGNIFICANT CHANGE UP (ref 0–0.5)
EOSINOPHIL NFR BLD AUTO: 3.8 % — SIGNIFICANT CHANGE UP (ref 0–6)
FLUAV AG NPH QL: SIGNIFICANT CHANGE UP
FLUBV AG NPH QL: SIGNIFICANT CHANGE UP
GLUCOSE SERPL-MCNC: 105 MG/DL — HIGH (ref 70–99)
HCT VFR BLD CALC: 38.6 % — SIGNIFICANT CHANGE UP (ref 34.5–45)
HGB BLD-MCNC: 11.9 G/DL — SIGNIFICANT CHANGE UP (ref 11.5–15.5)
IMM GRANULOCYTES NFR BLD AUTO: 0.2 % — SIGNIFICANT CHANGE UP (ref 0–0.9)
LACTATE SERPL-SCNC: 1.9 MMOL/L — SIGNIFICANT CHANGE UP (ref 0.7–2)
LYMPHOCYTES # BLD AUTO: 1.6 K/UL — SIGNIFICANT CHANGE UP (ref 1–3.3)
LYMPHOCYTES # BLD AUTO: 27.8 % — SIGNIFICANT CHANGE UP (ref 13–44)
MCHC RBC-ENTMCNC: 30 PG — SIGNIFICANT CHANGE UP (ref 27–34)
MCHC RBC-ENTMCNC: 30.8 GM/DL — LOW (ref 32–36)
MCV RBC AUTO: 97.2 FL — SIGNIFICANT CHANGE UP (ref 80–100)
MONOCYTES # BLD AUTO: 0.64 K/UL — SIGNIFICANT CHANGE UP (ref 0–0.9)
MONOCYTES NFR BLD AUTO: 11.1 % — SIGNIFICANT CHANGE UP (ref 2–14)
NEUTROPHILS # BLD AUTO: 3.25 K/UL — SIGNIFICANT CHANGE UP (ref 1.8–7.4)
NEUTROPHILS NFR BLD AUTO: 56.4 % — SIGNIFICANT CHANGE UP (ref 43–77)
NRBC # BLD: 0 /100 WBCS — SIGNIFICANT CHANGE UP (ref 0–0)
NT-PROBNP SERPL-SCNC: 433 PG/ML — SIGNIFICANT CHANGE UP (ref 0–450)
PLATELET # BLD AUTO: 248 K/UL — SIGNIFICANT CHANGE UP (ref 150–400)
POTASSIUM SERPL-MCNC: 4.3 MMOL/L — SIGNIFICANT CHANGE UP (ref 3.5–5.3)
POTASSIUM SERPL-SCNC: 4.3 MMOL/L — SIGNIFICANT CHANGE UP (ref 3.5–5.3)
PROT SERPL-MCNC: 6.7 G/DL — SIGNIFICANT CHANGE UP (ref 6–8.3)
RBC # BLD: 3.97 M/UL — SIGNIFICANT CHANGE UP (ref 3.8–5.2)
RBC # FLD: 12.9 % — SIGNIFICANT CHANGE UP (ref 10.3–14.5)
RSV RNA NPH QL NAA+NON-PROBE: SIGNIFICANT CHANGE UP
SARS-COV-2 RNA SPEC QL NAA+PROBE: SIGNIFICANT CHANGE UP
SODIUM SERPL-SCNC: 144 MMOL/L — SIGNIFICANT CHANGE UP (ref 135–145)
TROPONIN I, HIGH SENSITIVITY RESULT: 15.9 NG/L — SIGNIFICANT CHANGE UP
WBC # BLD: 5.76 K/UL — SIGNIFICANT CHANGE UP (ref 3.8–10.5)
WBC # FLD AUTO: 5.76 K/UL — SIGNIFICANT CHANGE UP (ref 3.8–10.5)

## 2022-10-22 PROCEDURE — 99285 EMERGENCY DEPT VISIT HI MDM: CPT | Mod: 25

## 2022-10-22 PROCEDURE — 93010 ELECTROCARDIOGRAM REPORT: CPT

## 2022-10-22 PROCEDURE — 85025 COMPLETE CBC W/AUTO DIFF WBC: CPT

## 2022-10-22 PROCEDURE — 87040 BLOOD CULTURE FOR BACTERIA: CPT

## 2022-10-22 PROCEDURE — 71045 X-RAY EXAM CHEST 1 VIEW: CPT

## 2022-10-22 PROCEDURE — 83880 ASSAY OF NATRIURETIC PEPTIDE: CPT

## 2022-10-22 PROCEDURE — 80053 COMPREHEN METABOLIC PANEL: CPT

## 2022-10-22 PROCEDURE — 83605 ASSAY OF LACTIC ACID: CPT

## 2022-10-22 PROCEDURE — 36415 COLL VENOUS BLD VENIPUNCTURE: CPT

## 2022-10-22 PROCEDURE — 84484 ASSAY OF TROPONIN QUANT: CPT

## 2022-10-22 PROCEDURE — 99285 EMERGENCY DEPT VISIT HI MDM: CPT | Mod: FS

## 2022-10-22 PROCEDURE — 93005 ELECTROCARDIOGRAM TRACING: CPT

## 2022-10-22 PROCEDURE — 71045 X-RAY EXAM CHEST 1 VIEW: CPT | Mod: 26

## 2022-10-22 PROCEDURE — 87637 SARSCOV2&INF A&B&RSV AMP PRB: CPT

## 2022-10-22 RX ORDER — FUROSEMIDE 40 MG
40 TABLET ORAL ONCE
Refills: 0 | Status: COMPLETED | OUTPATIENT
Start: 2022-10-22 | End: 2022-10-22

## 2022-10-22 RX ORDER — SODIUM CHLORIDE 9 MG/ML
500 INJECTION INTRAMUSCULAR; INTRAVENOUS; SUBCUTANEOUS ONCE
Refills: 0 | Status: COMPLETED | OUTPATIENT
Start: 2022-10-22 | End: 2022-10-22

## 2022-10-22 RX ADMIN — Medication 40 MILLIGRAM(S): at 19:48

## 2022-10-22 RX ADMIN — SODIUM CHLORIDE 500 MILLILITER(S): 9 INJECTION INTRAMUSCULAR; INTRAVENOUS; SUBCUTANEOUS at 17:00

## 2022-10-22 NOTE — ED PROVIDER NOTE - NS ED ATTENDING STATEMENT MOD
This was a shared visit with the JEIMY. I reviewed and verified the documentation and independently performed the documented:

## 2022-10-22 NOTE — ED PROVIDER NOTE - CONSTITUTIONAL, MLM
Well appearing elderly female, awake, alert, oriented to person, place, time/situation and in no apparent distress. normal...

## 2022-10-22 NOTE — ED ADULT TRIAGE NOTE - HEIGHT IN FEET
Add 41225 Cpt? (Important Note: In 2017 The Use Of 44401 Is Being Tracked By Cms To Determine Future Global Period Reimbursement For Global Periods): yes
Detail Level: Detailed
4

## 2022-10-22 NOTE — ED PROVIDER NOTE - CARE PROVIDER_API CALL
Marzena Taylor)  Internal Medicine  [address] [place] [address]  Phone: [phone]  Fax: [phone]  Follow Up Time: 1-3 Days

## 2022-10-22 NOTE — ED ADULT NURSE NOTE - OBJECTIVE STATEMENT
Pt presents to the ED Pt presents to the ED s/p cough, hypotension. EKG done, pt placed on cardiac monitor. safety maintained.

## 2022-10-22 NOTE — ED PROVIDER NOTE - PATIENT PORTAL LINK FT
You can access the FollowMyHealth Patient Portal offered by [facility] by registering at the following website: [email or web address]. By joining [employer]’s FollowMyHealth portal, you will also be able to view your health information using other applications (apps) compatible with our system.

## 2022-10-22 NOTE — ED PROVIDER NOTE - OBJECTIVE STATEMENT
89 yo female with h/o dm, htn, hld, osteopenia, gastritis, cad with 2 stents, dm, OA sent to ED by pmd for cough, sob, peripheral edema and generalized weakness x 4 days. cough nonproductive. SOB worse with exertion. Denies fever, chills, chest pain, abd pain, N/V, UE/LE weakness or paresthesias. no fall or trauma.     pmd: Dr. Marzena Taylor

## 2022-10-22 NOTE — ED PROVIDER NOTE - CARDIAC, MLM
Normal rate, regular rhythm.  Heart sounds S1, S2.  No murmurs, rubs or gallops. 1+ bilateral pitting edema

## 2022-11-04 ENCOUNTER — INPATIENT (INPATIENT)
Facility: HOSPITAL | Age: 87
LOS: 3 days | Discharge: HOME | DRG: 177 | End: 2022-11-08
Payer: MEDICARE

## 2022-11-04 VITALS — OXYGEN SATURATION: 96 % | HEART RATE: 70 BPM | RESPIRATION RATE: 18 BRPM | HEIGHT: 59 IN | WEIGHT: 169.98 LBS

## 2022-11-04 DIAGNOSIS — U07.1 COVID-19: ICD-10-CM

## 2022-11-04 DIAGNOSIS — Z95.5 PRESENCE OF CORONARY ANGIOPLASTY IMPLANT AND GRAFT: Chronic | ICD-10-CM

## 2022-11-04 DIAGNOSIS — H26.9 UNSPECIFIED CATARACT: Chronic | ICD-10-CM

## 2022-11-04 LAB
ALBUMIN SERPL ELPH-MCNC: 3.5 G/DL — SIGNIFICANT CHANGE UP (ref 3.3–5)
ALP SERPL-CCNC: 73 U/L — SIGNIFICANT CHANGE UP (ref 40–120)
ALT FLD-CCNC: 20 U/L — SIGNIFICANT CHANGE UP (ref 12–78)
ANION GAP SERPL CALC-SCNC: 8 MMOL/L — SIGNIFICANT CHANGE UP (ref 5–17)
APTT BLD: 28.3 SEC — SIGNIFICANT CHANGE UP (ref 27.5–35.5)
AST SERPL-CCNC: 22 U/L — SIGNIFICANT CHANGE UP (ref 15–37)
BILIRUB SERPL-MCNC: 0.4 MG/DL — SIGNIFICANT CHANGE UP (ref 0.2–1.2)
BUN SERPL-MCNC: 33 MG/DL — HIGH (ref 7–23)
CALCIUM SERPL-MCNC: 9.6 MG/DL — SIGNIFICANT CHANGE UP (ref 8.5–10.1)
CHLORIDE SERPL-SCNC: 109 MMOL/L — HIGH (ref 96–108)
CO2 SERPL-SCNC: 25 MMOL/L — SIGNIFICANT CHANGE UP (ref 22–31)
CREAT SERPL-MCNC: 1.5 MG/DL — HIGH (ref 0.5–1.3)
EGFR: 33 ML/MIN/1.73M2 — LOW
GLUCOSE SERPL-MCNC: 180 MG/DL — HIGH (ref 70–99)
HCT VFR BLD CALC: 27.7 % — LOW (ref 34.5–45)
HCT VFR BLD CALC: 29.6 % — LOW (ref 34.5–45)
HGB BLD-MCNC: 8.8 G/DL — LOW (ref 11.5–15.5)
HGB BLD-MCNC: 9.3 G/DL — LOW (ref 11.5–15.5)
INR BLD: 1.19 RATIO — HIGH (ref 0.88–1.16)
LACTATE SERPL-SCNC: 0.9 MMOL/L — SIGNIFICANT CHANGE UP (ref 0.7–2)
LACTATE SERPL-SCNC: 3 MMOL/L — HIGH (ref 0.7–2)
MCHC RBC-ENTMCNC: 30.6 PG — SIGNIFICANT CHANGE UP (ref 27–34)
MCHC RBC-ENTMCNC: 30.7 PG — SIGNIFICANT CHANGE UP (ref 27–34)
MCHC RBC-ENTMCNC: 31.4 GM/DL — LOW (ref 32–36)
MCHC RBC-ENTMCNC: 31.8 GM/DL — LOW (ref 32–36)
MCV RBC AUTO: 96.2 FL — SIGNIFICANT CHANGE UP (ref 80–100)
MCV RBC AUTO: 97.7 FL — SIGNIFICANT CHANGE UP (ref 80–100)
NRBC # BLD: 0 /100 WBCS — SIGNIFICANT CHANGE UP (ref 0–0)
NRBC # BLD: 0 /100 WBCS — SIGNIFICANT CHANGE UP (ref 0–0)
OB PNL STL: POSITIVE
PLATELET # BLD AUTO: 187 K/UL — SIGNIFICANT CHANGE UP (ref 150–400)
PLATELET # BLD AUTO: 193 K/UL — SIGNIFICANT CHANGE UP (ref 150–400)
POTASSIUM SERPL-MCNC: 4.7 MMOL/L — SIGNIFICANT CHANGE UP (ref 3.5–5.3)
POTASSIUM SERPL-SCNC: 4.7 MMOL/L — SIGNIFICANT CHANGE UP (ref 3.5–5.3)
PROT SERPL-MCNC: 7.4 G/DL — SIGNIFICANT CHANGE UP (ref 6–8.3)
PROTHROM AB SERPL-ACNC: 13.9 SEC — HIGH (ref 10.5–13.4)
RAPID RVP RESULT: DETECTED
RBC # BLD: 2.88 M/UL — LOW (ref 3.8–5.2)
RBC # BLD: 3.03 M/UL — LOW (ref 3.8–5.2)
RBC # FLD: 12.6 % — SIGNIFICANT CHANGE UP (ref 10.3–14.5)
RBC # FLD: 12.8 % — SIGNIFICANT CHANGE UP (ref 10.3–14.5)
SARS-COV-2 RNA SPEC QL NAA+PROBE: DETECTED
SODIUM SERPL-SCNC: 142 MMOL/L — SIGNIFICANT CHANGE UP (ref 135–145)
WBC # BLD: 6.3 K/UL — SIGNIFICANT CHANGE UP (ref 3.8–10.5)
WBC # BLD: 6.55 K/UL — SIGNIFICANT CHANGE UP (ref 3.8–10.5)
WBC # FLD AUTO: 6.3 K/UL — SIGNIFICANT CHANGE UP (ref 3.8–10.5)
WBC # FLD AUTO: 6.55 K/UL — SIGNIFICANT CHANGE UP (ref 3.8–10.5)

## 2022-11-04 PROCEDURE — 74176 CT ABD & PELVIS W/O CONTRAST: CPT | Mod: 26,ME

## 2022-11-04 PROCEDURE — 71045 X-RAY EXAM CHEST 1 VIEW: CPT | Mod: 26

## 2022-11-04 PROCEDURE — G1004: CPT

## 2022-11-04 PROCEDURE — 99285 EMERGENCY DEPT VISIT HI MDM: CPT | Mod: FS,CS

## 2022-11-04 PROCEDURE — 71250 CT THORAX DX C-: CPT | Mod: 26,MG

## 2022-11-04 RX ORDER — GLUCAGON INJECTION, SOLUTION 0.5 MG/.1ML
1 INJECTION, SOLUTION SUBCUTANEOUS ONCE
Refills: 0 | Status: DISCONTINUED | OUTPATIENT
Start: 2022-11-04 | End: 2022-11-08

## 2022-11-04 RX ORDER — ATENOLOL 25 MG/1
1 TABLET ORAL
Qty: 0 | Refills: 0 | DISCHARGE

## 2022-11-04 RX ORDER — ACETAMINOPHEN 500 MG
650 TABLET ORAL EVERY 6 HOURS
Refills: 0 | Status: DISCONTINUED | OUTPATIENT
Start: 2022-11-04 | End: 2022-11-08

## 2022-11-04 RX ORDER — ACETAMINOPHEN 500 MG
1000 TABLET ORAL ONCE
Refills: 0 | Status: COMPLETED | OUTPATIENT
Start: 2022-11-04 | End: 2022-11-04

## 2022-11-04 RX ORDER — SODIUM CHLORIDE 9 MG/ML
1000 INJECTION, SOLUTION INTRAVENOUS
Refills: 0 | Status: DISCONTINUED | OUTPATIENT
Start: 2022-11-04 | End: 2022-11-08

## 2022-11-04 RX ORDER — SODIUM CHLORIDE 9 MG/ML
2400 INJECTION INTRAMUSCULAR; INTRAVENOUS; SUBCUTANEOUS ONCE
Refills: 0 | Status: COMPLETED | OUTPATIENT
Start: 2022-11-04 | End: 2022-11-04

## 2022-11-04 RX ORDER — DEXTROSE 50 % IN WATER 50 %
12.5 SYRINGE (ML) INTRAVENOUS ONCE
Refills: 0 | Status: DISCONTINUED | OUTPATIENT
Start: 2022-11-04 | End: 2022-11-08

## 2022-11-04 RX ORDER — ATENOLOL 25 MG/1
25 TABLET ORAL DAILY
Refills: 0 | Status: DISCONTINUED | OUTPATIENT
Start: 2022-11-04 | End: 2022-11-08

## 2022-11-04 RX ORDER — SODIUM CHLORIDE 9 MG/ML
1000 INJECTION INTRAMUSCULAR; INTRAVENOUS; SUBCUTANEOUS
Refills: 0 | Status: DISCONTINUED | OUTPATIENT
Start: 2022-11-04 | End: 2022-11-08

## 2022-11-04 RX ORDER — LANOLIN ALCOHOL/MO/W.PET/CERES
3 CREAM (GRAM) TOPICAL AT BEDTIME
Refills: 0 | Status: DISCONTINUED | OUTPATIENT
Start: 2022-11-04 | End: 2022-11-08

## 2022-11-04 RX ORDER — DEXTROSE 50 % IN WATER 50 %
25 SYRINGE (ML) INTRAVENOUS ONCE
Refills: 0 | Status: DISCONTINUED | OUTPATIENT
Start: 2022-11-04 | End: 2022-11-08

## 2022-11-04 RX ORDER — SIMVASTATIN 20 MG/1
40 TABLET, FILM COATED ORAL AT BEDTIME
Refills: 0 | Status: DISCONTINUED | OUTPATIENT
Start: 2022-11-04 | End: 2022-11-08

## 2022-11-04 RX ORDER — PIPERACILLIN AND TAZOBACTAM 4; .5 G/20ML; G/20ML
3.38 INJECTION, POWDER, LYOPHILIZED, FOR SOLUTION INTRAVENOUS ONCE
Refills: 0 | Status: COMPLETED | OUTPATIENT
Start: 2022-11-04 | End: 2022-11-04

## 2022-11-04 RX ORDER — SUCRALFATE 1 G
1 TABLET ORAL ONCE
Refills: 0 | Status: COMPLETED | OUTPATIENT
Start: 2022-11-04 | End: 2022-11-04

## 2022-11-04 RX ORDER — DEXTROSE 50 % IN WATER 50 %
15 SYRINGE (ML) INTRAVENOUS ONCE
Refills: 0 | Status: DISCONTINUED | OUTPATIENT
Start: 2022-11-04 | End: 2022-11-08

## 2022-11-04 RX ORDER — INSULIN LISPRO 100/ML
VIAL (ML) SUBCUTANEOUS
Refills: 0 | Status: DISCONTINUED | OUTPATIENT
Start: 2022-11-04 | End: 2022-11-08

## 2022-11-04 RX ORDER — PANTOPRAZOLE SODIUM 20 MG/1
40 TABLET, DELAYED RELEASE ORAL
Refills: 0 | Status: DISCONTINUED | OUTPATIENT
Start: 2022-11-04 | End: 2022-11-08

## 2022-11-04 RX ADMIN — Medication 3 MILLIGRAM(S): at 22:58

## 2022-11-04 RX ADMIN — SIMVASTATIN 40 MILLIGRAM(S): 20 TABLET, FILM COATED ORAL at 22:58

## 2022-11-04 RX ADMIN — ATENOLOL 25 MILLIGRAM(S): 25 TABLET ORAL at 22:57

## 2022-11-04 RX ADMIN — SODIUM CHLORIDE 2400 MILLILITER(S): 9 INJECTION INTRAMUSCULAR; INTRAVENOUS; SUBCUTANEOUS at 16:42

## 2022-11-04 RX ADMIN — Medication 400 MILLIGRAM(S): at 16:41

## 2022-11-04 RX ADMIN — Medication 1000 MILLIGRAM(S): at 20:18

## 2022-11-04 RX ADMIN — PIPERACILLIN AND TAZOBACTAM 200 GRAM(S): 4; .5 INJECTION, POWDER, LYOPHILIZED, FOR SOLUTION INTRAVENOUS at 16:42

## 2022-11-04 RX ADMIN — PANTOPRAZOLE SODIUM 40 MILLIGRAM(S): 20 TABLET, DELAYED RELEASE ORAL at 22:58

## 2022-11-04 NOTE — H&P ADULT - NSHPPHYSICALEXAM_GEN_ALL_CORE
General: WN/WD NAD  PERRLA  Neurology: A&Ox3, nonfocal, COE x 4  Respiratory: CTA B/L  CV: RRR, S1S2, no murmurs, rubs or gallops  Abdominal: Soft, NT, ND +BS, Last BM  Extremities: No edema, + peripheral pulses  Skin Normal

## 2022-11-04 NOTE — ED ADULT NURSE NOTE - OBJECTIVE STATEMENT
Per pt's daughter pt had abn episode of bloody stools and abd pain today. pt's daughter states that pt has been having as cough for 3 weeks. Pt appears uncomfortable. Pt in no acute distress.

## 2022-11-04 NOTE — ED PROVIDER NOTE - CLINICAL SUMMARY MEDICAL DECISION MAKING FREE TEXT BOX
89-year-old female with history of diabetes, hypertension, high cholesterol, osteoarthritis presents with generalized weakness today.  Patient was complaining of some abdominal discomfort, vomited today.  Patient then had bloody bowel movement.  No known fever or chills.  No recent cough/URI.  No known COVID exposures.  Patient fully vaccinated for COVID.  No aggravating alleviating factors otherwise noted.  No other acute complaints at this time.  No recent travel or immobilization.  Exam: Nontoxic appearing.  CTA BL, no W/R/R.  Normal respiratory effort.  Abdomen soft, mild lower midline abdominal tenderness.  No rebound or guarding.  No CVAT.  No C/C/E.  Normal cardiac exam.  Normal nonfocal neuro exam.  No other acute findings on exam.  Generalized weakness and malaise with abdominal discomfort, GI bleed.  Patient with episode of hypotension, now improved.  Check labs, IV fluids, CT, reeval.

## 2022-11-04 NOTE — CONSULT NOTE ADULT - SUBJECTIVE AND OBJECTIVE BOX
Patient is a 89y Female whom presented to the hospital with ckd and puneet     PAST MEDICAL & SURGICAL HISTORY:  Diabetes mellitus      CAD (coronary artery disease)  2 cardiac stents placed      Hypertension      Hyperlipidemia      HTN (hypertension)      DM (diabetes mellitus)  diet-controlled      CAD (coronary artery disease)      History of cholelithiasis      Hypercholesteremia      Bilateral cataracts      S/P coronary artery stent placement          MEDICATIONS  (STANDING):  ATENolol  Tablet 25 milliGRAM(s) Oral daily  benzonatate 100 milliGRAM(s) Oral three times a day  dextrose 5%. 1000 milliLiter(s) (100 mL/Hr) IV Continuous <Continuous>  dextrose 5%. 1000 milliLiter(s) (50 mL/Hr) IV Continuous <Continuous>  dextrose 50% Injectable 25 Gram(s) IV Push once  dextrose 50% Injectable 12.5 Gram(s) IV Push once  dextrose 50% Injectable 25 Gram(s) IV Push once  glucagon  Injectable 1 milliGRAM(s) IntraMuscular once  influenza  Vaccine (HIGH DOSE) 0.7 milliLiter(s) IntraMuscular once  insulin lispro (ADMELOG) corrective regimen sliding scale   SubCutaneous three times a day before meals  isosorbide   mononitrate ER Tablet (IMDUR) 30 milliGRAM(s) Oral daily  melatonin 3 milliGRAM(s) Oral at bedtime  pantoprazole  Injectable 40 milliGRAM(s) IV Push two times a day  simvastatin 40 milliGRAM(s) Oral at bedtime  sodium chloride 0.9%. 1000 milliLiter(s) (75 mL/Hr) IV Continuous <Continuous>      Allergies    No Known Allergies    Intolerances        SOCIAL HISTORY:  Denies ETOh,Smoking,     FAMILY HISTORY:  FH: MI (myocardial infarction) (Child)        REVIEW OF SYSTEMS:    CONSTITUTIONAL: No weakness, fevers or chills  RESPIRATORY: No cough, wheezing, hemoptysis; No shortness of breath  CARDIOVASCULAR: No chest pain or palpitations  GASTROINTESTINAL: No abdominal or epigastric pain. No nausea, vomiting,     No diarrhea or constipation. No melena   SKIN: dry      Labs, Radiology, Cardiology, and Other Results: Lab Results:  CBC  CBC Full  -  ( 04 Nov 2022 17:06 )  WBC Count : 6.30 K/uL  RBC Count : 2.88 M/uL  Hemoglobin : 8.8 g/dL  Hematocrit : 27.7 %  Platelet Count - Automated : 187 K/uL  Mean Cell Volume : 96.2 fl  Mean Cell Hemoglobin : 30.6 pg  Mean Cell Hemoglobin Concentration : 31.8 gm/dL  Auto Neutrophil # : x  Auto Lymphocyte # : x  Auto Monocyte # : x  Auto Eosinophil # : x  Auto Basophil # : x  Auto Neutrophil % : x  Auto Lymphocyte % : x  Auto Monocyte % : x  Auto Eosinophil % : x  Auto Basophil % : x    .		Differential:	[] Automated		[] Manual  Chemistry                        8.8    6.30  )-----------( 187      ( 04 Nov 2022 17:06 )             27.7     11-04    142  |  109<H>  |  33<H>  ----------------------------<  180<H>  4.7   |  25  |  1.50<H>    Ca    9.6      04 Nov 2022 16:05    TPro  7.4  /  Alb  3.5  /  TBili  0.4  /  DBili  x   /  AST  22  /  ALT  20  /  AlkPhos  73  11-04    LIVER FUNCTIONS - ( 04 Nov 2022 16:05 )  Alb: 3.5 g/dL / Pro: 7.4 g/dL / ALK PHOS: 73 U/L / ALT: 20 U/L / AST: 22 U/L / GGT: x           PT/INR - ( 04 Nov 2022 16:05 )   PT: 13.9 sec;   INR: 1.19 ratio         PTT - ( 04 Nov 2022 16:05 )  PTT:28.3 sec          MICROBIOLOGY/CULTURES:      RADIOLOGY RESULTS: reviewed                  PHYSICAL EXAM:    Constitutional: NAD  Respiratory: CTAB  Cardiovascular: S1 and S2  Gastrointestinal: BS+, soft, NT/ND  Extremities: No peripheral edema  Skin: dry   Access: Not applicable

## 2022-11-04 NOTE — H&P ADULT - NSHPLABSRESULTS_GEN_ALL_CORE
Lab Results:  CBC  CBC Full  -  ( 04 Nov 2022 17:06 )  WBC Count : 6.30 K/uL  RBC Count : 2.88 M/uL  Hemoglobin : 8.8 g/dL  Hematocrit : 27.7 %  Platelet Count - Automated : 187 K/uL  Mean Cell Volume : 96.2 fl  Mean Cell Hemoglobin : 30.6 pg  Mean Cell Hemoglobin Concentration : 31.8 gm/dL  Auto Neutrophil # : x  Auto Lymphocyte # : x  Auto Monocyte # : x  Auto Eosinophil # : x  Auto Basophil # : x  Auto Neutrophil % : x  Auto Lymphocyte % : x  Auto Monocyte % : x  Auto Eosinophil % : x  Auto Basophil % : x    .		Differential:	[] Automated		[] Manual  Chemistry                        8.8    6.30  )-----------( 187      ( 04 Nov 2022 17:06 )             27.7     11-04    142  |  109<H>  |  33<H>  ----------------------------<  180<H>  4.7   |  25  |  1.50<H>    Ca    9.6      04 Nov 2022 16:05    TPro  7.4  /  Alb  3.5  /  TBili  0.4  /  DBili  x   /  AST  22  /  ALT  20  /  AlkPhos  73  11-04    LIVER FUNCTIONS - ( 04 Nov 2022 16:05 )  Alb: 3.5 g/dL / Pro: 7.4 g/dL / ALK PHOS: 73 U/L / ALT: 20 U/L / AST: 22 U/L / GGT: x           PT/INR - ( 04 Nov 2022 16:05 )   PT: 13.9 sec;   INR: 1.19 ratio         PTT - ( 04 Nov 2022 16:05 )  PTT:28.3 sec          MICROBIOLOGY/CULTURES:      RADIOLOGY RESULTS: reviewed

## 2022-11-04 NOTE — ED PROVIDER NOTE - OBJECTIVE STATEMENT
89 F hx dm , htn, hld, gastritis, cad, stents x 2, OA BIB daughter due to bloody bowel movement around 2 PM. Pt woke up with decreased PO intake, not feeling well, +chills. Complained of abdominal discomfort. Vomited. Took a nap. Woke up asking to go to bathroom. While walking with daughter she had bloody BM in diaper. On ASA.

## 2022-11-04 NOTE — ED ADULT TRIAGE NOTE - INTERNATIONAL TRAVEL
ED Medical Clearance HPI





- General


Chief complaint: Medical Clearance


Stated complaint: WORK EXCUSE


Time Seen by Provider: 09/16/18 16:42


Source: patient


Mode of arrival: Ambulatory





- History of Present Illness


Initial comments: 





This is a 27-year-old male nontoxic in appearance with no signs of distress 

presents to the ER for a work excuse note.  Patient stated that while he was 

driving to work he was eating and started to laugh and vomited one time.  

Patient to vomit when onto his apron and stated that he is to get a medical 

clearance to return to the work.  Patient stated that he has no complaints.  

Patient denies any abdominal pain.  Patient stated that he vomited only once.  

Patient denies any nausea or vomiting, chest pain, shortness of breath, headache

, stiff neck, numbness, tingling, back pain, urinary symptoms.  He states 

allergies to aspirin with no significant past medical history.


MD Complaint: medical clearance request


-: This afternoon


Traumatic Symptoms: denies traumatic injury


Associated Symptoms: denies other symptoms.  denies: chest pain, shortness of 

breath, palpitations, diaphoresis, confusion, cough, fever/chills, headaches, 

anorexia, malaise, nausea/vomiting, rash, seizure, syncope, weakness


Treatments Prior to Arrival: none


Home medications: 


 Previous Rx's











 Medication  Instructions  Recorded  Last Taken  Type


 


HYDROcodone/APAP 5-325 [[place] 1 each PO Q6HR PRN #14 tablet 03/28/14 Unknown Rx





5/325 mg]    


 


Cyclobenzaprine [Flexeril] 10 mg PO TID PRN #20 tablet 12/19/16 Unknown Rx


 


Ibuprofen [Motrin 800 MG tab] 800 mg PO Q8HR PRN #30 tablet 12/19/16 Unknown Rx











Allergies/Adverse reactions: 


 Allergies











Allergy/AdvReac Type Severity Reaction Status Date / Time


 


aspirin Allergy  Swelling Verified 03/28/14 03:05














ED Review of Systems


ROS: 


Stated complaint: WORK EXCUSE


Other details as noted in HPI





Constitutional: denies: chills, fever


Eyes: denies: eye pain, eye discharge, vision change


ENT: denies: ear pain, throat pain


Respiratory: denies: cough, shortness of breath, wheezing


Cardiovascular: denies: chest pain, palpitations


Endocrine: no symptoms reported


Gastrointestinal: denies: abdominal pain, nausea, diarrhea


Genitourinary: denies: urgency, dysuria


Musculoskeletal: denies: back pain, joint swelling, arthralgia


Skin: denies: rash, lesions


Neurological: denies: headache, weakness, paresthesias


Psychiatric: denies: anxiety, depression


Hematological/Lymphatic: denies: easy bleeding, easy bruising





ED Past Medical Hx





- Past Medical History


Previous Medical History?: No





- Surgical History


Past Surgical History?: No





- Social History


Smoking Status: Never Smoker


Substance Use Type: None





- Medications


Home Medications: 


 Home Medications











 Medication  Instructions  Recorded  Confirmed  Last Taken  Type


 


HYDROcodone/APAP 5-325 [[place] 1 each PO Q6HR PRN #14 tablet 03/28/14  Unknown Rx





5/325 mg]     


 


Cyclobenzaprine [Flexeril] 10 mg PO TID PRN #20 tablet 12/19/16  Unknown Rx


 


Ibuprofen [Motrin 800 MG tab] 800 mg PO Q8HR PRN #30 tablet 12/19/16  Unknown Rx














ED Physical Exam





- General


Limitations: No Limitations


General appearance: alert, in no apparent distress





- Head


Head exam: Present: atraumatic, normocephalic





- Eye


Eye exam: Present: normal appearance





- ENT


ENT exam: Present: normal exam, mucous membranes moist





- Neck


Neck exam: Present: normal inspection, full ROM





- Respiratory


Respiratory exam: Present: normal lung sounds bilaterally.  Absent: respiratory 

distress, wheezes, rales, rhonchi, stridor, chest wall tenderness, accessory 

muscle use, decreased breath sounds, prolonged expiratory





- Cardiovascular


Cardiovascular Exam: Present: regular rate, normal rhythm, normal heart sounds.

  Absent: bradycardia, tachycardia, irregular rhythm, systolic murmur, 

diastolic murmur, rubs, gallop





- GI/Abdominal


GI/Abdominal exam: Present: soft, normal bowel sounds.  Absent: distended, 

tenderness, guarding, rebound, rigid, diminished bowel sounds





- Rectal


Rectal exam: Present: deferred





- Extremities Exam


Extremities exam: Present: normal inspection





- Back Exam


Back exam: Present: normal inspection





- Neurological Exam


Neurological exam: Present: alert, oriented X3





- Psychiatric


Psychiatric exam: Present: normal affect, normal mood





- Skin


Skin exam: Present: warm, dry, intact, normal color.  Absent: rash





ED Course





 Vital Signs











  09/16/18





  16:36


 


Temperature 98.6 F


 


Pulse Rate 59 L


 


Respiratory 16





Rate 


 


Blood Pressure 114/70


 


O2 Sat by Pulse 100





Oximetry 














- Reevaluation(s)


Reevaluation #1: 





09/16/18 16:46


Patient is speaking in full sentences with no signs of distress noted.





ED Medical Decision Making





- Medical Decision Making





Patient is asymptomatic.  Denies any nausea vomiting.  Patient was laughing and 

stated this is only for a work excuse to go back to work today.  Patient 

otherwise is healthy with no past medical history.  Normal physical exam.





ED Disposition


Clinical Impression: 


 Return to work exam





Disposition: DC-01 TO HOME OR SELFCARE


Is pt being admited?: No


Does the pt Need Aspirin: No


Condition: Stable


Instructions:  Acute Nausea and Vomiting (ED)


Additional Instructions: 


Follow-up with a primary care doctor in 3-5 days or if symptoms worsen and 

continue return to emergency room as soon as possible. 


Referrals: 


PRIMARY CARE,MD [Referring] - 3-5 Days


PAT LEYVA MD [Staff Physician] - 3-5 Days


[facility] [Outside] - 3-5 Days


Forms:  Work/School Release Form(ED) No

## 2022-11-04 NOTE — H&P ADULT - ASSESSMENT
89 F hx dm , htn, hld, gastritis, cad, stents x 2, OA BIB daughter due to bloody bowel movement around 2 PM. Pt woke up with decreased PO intake, not feeling well, +chills. Complained of abdominal discomfort. Vomited. Took a nap. Woke up asking to go to bathroom. While walking with daughter she had bloody BM in diaper. On ASA.    1 Acute blood loss anemia  - monitor cbc  - keep type and screen active  - GI fu   - cw protonix  - will monitor     2 COVID 19  - pulmonary fu   - symptomatic care     3 HILLARY  - monitor cr  - hold lasix  - gentle hydration    4 DM  - monitor FS  - ISS     VENODYNES

## 2022-11-04 NOTE — CONSULT NOTE ADULT - ASSESSMENT
89 F hx dm , htn, hld, gastritis, cad, stents x 2, OA BIB daughter due to bloody bowel movement around 2 PM. Pt woke up with decreased PO intake, not feeling well, +chills. Complained of abdominal discomfort. Vomited. Took a nap. Woke up asking to go to bathroom. While walking with daughter she had bloody BM in diaper. On ASA. (04 Nov 2022 18:47)      ACUTE RENAL FAILURE: sodium chloride 0.9%. 1000 milliLiter(s) (75 mL/Hr) IV Continuous   Serum creatinine is  at     , approximating GFR at   ml/min.   There is no progression . No uremic symptoms  No evidence of anemia .  Fluid status stable.  Will continue to avoid nephrotoxic drugs.  Patient remains asymptomatic.   Continue current therapy.  hold  diuretic.  hold   ACE inhibitor.  hold   ARB.  Additional evaluation:   ECG,    echocardiogram,     CXR,  will obtained recent   renal ultrasound to evalaute kidney size and possible stones ,    BP monitoring,continue current antihypertensive meds, low salt diet,followup with PMD in 1-2 weeks   ATENolol  Tablet 25 milliGRAM(s) Oral daily

## 2022-11-04 NOTE — ED PROVIDER NOTE - INTERNATIONAL TRAVEL
Occupational Therapy  Visit Type: treatment  Precautions:  Medical precautions:  fall risk; standard precautions.    Lines:     Basic: capped IV      Lines in chart and on patient reviewed, cautions maintained throughout session.  Lower Extremity:    Pt has shoes with ankle supports he wears for longer distance ambulation  Hearing: no hearing deficits    SUBJECTIVE  Patient agreed to participate in therapy this date.  0940: Pt declines working with therapy at this time. \"I just ate a big meal, I had a clot last night, can I please rest for now? I promise I will work with you today, may I please work with you at 1100?\" Writer agreeable to attempt working with pt at this time.     1102: Pt willing to work with therapy; \"I just am having a lot of sharp pain in my abdomen\"   Patient / Family Goal: return home    Pain     Location: abdomen     At onset of session (out of 10): 5  RN informed on pain level    OBJECTIVE   Level of consciousness: alert      Affect/Behavior: cooperative and pleasant  Patient activity tolerance: 1 to 1 activity to rest  Functional Communication/Cognition    Overall status:  Within functional limits    Form of communication:  Verbal  Bed mobility:      Supine to sit: supervision  Training completed:      Increased time required for pt to complete transfer to EOB due to pain.   Transfers:    Assistive devices: gait belt, 2-wheeled walker, 1 person and 2 person    Sit to stand: contact guard/touching/steadying assist    Stand to sit: contact guard/touching/steadying assist    Training completed:      Education details: body mechanics and patient safety    CGA x 1 required for safety. Pt requested additional person to be present for transfers and mobility for safety. \"I am feeling weaker and heavier; I would like an additional person to be present for my movement out of bed\"  Functional Ambulation:    Assistance:contact guard/touching/steadying assist   Assistive device:1 person, 2 person and  2-wheeled walker      Education details: patient safety  Details/Comments: Pt completed mobility in room and hallway with CGA for safety; 2nd person present for safety at pt request.   Activities of Daily Living (ADLs):  Lower Body Dressing:     Footwear assistance: moderate assist    Footwear position: supine/bed    Assist needed for: don/doff left shoe, don/doff right shoe, tie shoes and increased time to complete  Activities of daily living training:   Pt declines completing bathing or other self cares at this time. Pt able to don socks sitting upright in bed with set-up. Pt required Mod A to don shoes, due to pain in pt's abdomen. Writer encouraged pt to attempt first and to complete as much dressing as possible independently. Pt required assistance to don shoes and tie laces, at request of pt who directed the task.       Interventions    Training provided: ADL training, breathing/relaxation, positioning and safety training  Skilled input: verbal instruction/cues  Verbal Consent: Writer verbally educated and received verbal consent for hand placement, positioning of patient, and techniques to be performed today from patient for clothing adjustments for techniques, hand placement and palpation for techniques and therapist position for techniques as described above and how they are pertinent to the patient's plan of care.        ASSESSMENT    Impairments: activity tolerance, pain, emotional/psychological and strength  Functional Limitations: bed mobility, functional mobility, grooming, bathing, toileting, functional transfers and dressing  Recommended Consults: OT: None  Discharge Recommendations:  Recommendations for Discharge: OT WI: Home, Home therapy      PT/OT Mobility Equipment for Discharge: pt has 2ww, no anticipated needs. Will continue to assess.  PT/OT ADL Equipment for Discharge: Anticipate no needs; equipment in place  OT Identified Barriers to Discharge: weakness, pain, medical condition     Pt  No willing to work with therapist today, able to collaborate with pt on time to meet. Pt completed LB dressing socks with set-up and Mod A for donning shoes/orthoses this session. Transfers and mobility with 2ww required CGA for safety; pt requested 2nd person be present for safety for transfers/mobility. Pt reporting 5/10 pain levels in abdomen; nursing aware. Recommending home with home therapy at d/c.       Skilled therapy is required to address these limitations in attempt to maximize the patient's independence.  Progress: progressing toward goals    End of Session:   Location: in chair  Safety measures: lines intact and call light within reach  Handoff to: nurse and nurse assistant    PLAN  Suggestions for next session as indicated: PLAN: grooming/bathing, progress mobility  Interventions: activity tolerance training, ADL retraining, compensatory technique education, functional transfer training, patient/family training, safety training and therapeutic activity  Agreement to plan and goals: patient agrees with goals and treatment plan      GOALS  Long Term Goals: (to be met by time of discharge from hospital)  Grooming: Patient will complete grooming tasks modified independent. Status: progressing/ongoing  Upper body dressing: Patient will complete upper body dressing modified independent. Status: progressing/ongoing  Lower body dressing: Patient will complete lower body dressing modified independent. Status: progressing/ongoing  Toileting: Patient will complete toileting modified independent.  Status: progressing/ongoing  Bathing: Patient will complete bathingmodified independent  Status: progressing/ongoingHome setting transfer: Patient will complete home setting transfers with modified independent.  Status: progressing/ongoing        Documented in the chart in the following areas: Pain. Assessment. Plan.      Therapy procedure time and total treatment time can be found documented on the Time Entry flowsheet

## 2022-11-05 DIAGNOSIS — K57.90 DIVERTICULOSIS OF INTESTINE, PART UNSPECIFIED, WITHOUT PERFORATION OR ABSCESS WITHOUT BLEEDING: ICD-10-CM

## 2022-11-05 LAB
A1C WITH ESTIMATED AVERAGE GLUCOSE RESULT: 6.7 % — HIGH (ref 4–5.6)
APPEARANCE UR: CLEAR — SIGNIFICANT CHANGE UP
BILIRUB UR-MCNC: NEGATIVE — SIGNIFICANT CHANGE UP
COLOR SPEC: SIGNIFICANT CHANGE UP
DIFF PNL FLD: ABNORMAL
ESTIMATED AVERAGE GLUCOSE: 146 MG/DL — HIGH (ref 68–114)
GLUCOSE UR QL: NEGATIVE — SIGNIFICANT CHANGE UP
HCT VFR BLD CALC: 29 % — LOW (ref 34.5–45)
HCT VFR BLD CALC: 29.9 % — LOW (ref 34.5–45)
HCT VFR BLD CALC: 30.7 % — LOW (ref 34.5–45)
HGB BLD-MCNC: 9.1 G/DL — LOW (ref 11.5–15.5)
HGB BLD-MCNC: 9.4 G/DL — LOW (ref 11.5–15.5)
HGB BLD-MCNC: 9.4 G/DL — LOW (ref 11.5–15.5)
KETONES UR-MCNC: NEGATIVE — SIGNIFICANT CHANGE UP
LEUKOCYTE ESTERASE UR-ACNC: ABNORMAL
MCHC RBC-ENTMCNC: 30 PG — SIGNIFICANT CHANGE UP (ref 27–34)
MCHC RBC-ENTMCNC: 30.1 PG — SIGNIFICANT CHANGE UP (ref 27–34)
MCHC RBC-ENTMCNC: 30.5 PG — SIGNIFICANT CHANGE UP (ref 27–34)
MCHC RBC-ENTMCNC: 30.6 GM/DL — LOW (ref 32–36)
MCHC RBC-ENTMCNC: 31.4 GM/DL — LOW (ref 32–36)
MCHC RBC-ENTMCNC: 31.4 GM/DL — LOW (ref 32–36)
MCV RBC AUTO: 95.5 FL — SIGNIFICANT CHANGE UP (ref 80–100)
MCV RBC AUTO: 97.3 FL — SIGNIFICANT CHANGE UP (ref 80–100)
MCV RBC AUTO: 98.4 FL — SIGNIFICANT CHANGE UP (ref 80–100)
NITRITE UR-MCNC: NEGATIVE — SIGNIFICANT CHANGE UP
NRBC # BLD: 0 /100 WBCS — SIGNIFICANT CHANGE UP (ref 0–0)
PH UR: 6 — SIGNIFICANT CHANGE UP (ref 5–8)
PLATELET # BLD AUTO: 186 K/UL — SIGNIFICANT CHANGE UP (ref 150–400)
PLATELET # BLD AUTO: 200 K/UL — SIGNIFICANT CHANGE UP (ref 150–400)
PLATELET # BLD AUTO: 201 K/UL — SIGNIFICANT CHANGE UP (ref 150–400)
PROT UR-MCNC: NEGATIVE — SIGNIFICANT CHANGE UP
RBC # BLD: 2.98 M/UL — LOW (ref 3.8–5.2)
RBC # BLD: 3.12 M/UL — LOW (ref 3.8–5.2)
RBC # BLD: 3.13 M/UL — LOW (ref 3.8–5.2)
RBC # FLD: 12.9 % — SIGNIFICANT CHANGE UP (ref 10.3–14.5)
RBC # FLD: 12.9 % — SIGNIFICANT CHANGE UP (ref 10.3–14.5)
RBC # FLD: 13 % — SIGNIFICANT CHANGE UP (ref 10.3–14.5)
SP GR SPEC: 1.01 — SIGNIFICANT CHANGE UP (ref 1.01–1.02)
UROBILINOGEN FLD QL: NEGATIVE — SIGNIFICANT CHANGE UP
WBC # BLD: 6.29 K/UL — SIGNIFICANT CHANGE UP (ref 3.8–10.5)
WBC # BLD: 6.29 K/UL — SIGNIFICANT CHANGE UP (ref 3.8–10.5)
WBC # BLD: 7.23 K/UL — SIGNIFICANT CHANGE UP (ref 3.8–10.5)
WBC # FLD AUTO: 6.29 K/UL — SIGNIFICANT CHANGE UP (ref 3.8–10.5)
WBC # FLD AUTO: 6.29 K/UL — SIGNIFICANT CHANGE UP (ref 3.8–10.5)
WBC # FLD AUTO: 7.23 K/UL — SIGNIFICANT CHANGE UP (ref 3.8–10.5)

## 2022-11-05 PROCEDURE — 93010 ELECTROCARDIOGRAM REPORT: CPT

## 2022-11-05 RX ORDER — ISOSORBIDE MONONITRATE 60 MG/1
30 TABLET, EXTENDED RELEASE ORAL DAILY
Refills: 0 | Status: DISCONTINUED | OUTPATIENT
Start: 2022-11-05 | End: 2022-11-08

## 2022-11-05 RX ORDER — INFLUENZA VIRUS VACCINE 15; 15; 15; 15 UG/.5ML; UG/.5ML; UG/.5ML; UG/.5ML
0.7 SUSPENSION INTRAMUSCULAR ONCE
Refills: 0 | Status: DISCONTINUED | OUTPATIENT
Start: 2022-11-05 | End: 2022-11-08

## 2022-11-05 RX ADMIN — Medication 3 MILLIGRAM(S): at 21:29

## 2022-11-05 RX ADMIN — PANTOPRAZOLE SODIUM 40 MILLIGRAM(S): 20 TABLET, DELAYED RELEASE ORAL at 05:09

## 2022-11-05 RX ADMIN — ISOSORBIDE MONONITRATE 30 MILLIGRAM(S): 60 TABLET, EXTENDED RELEASE ORAL at 12:04

## 2022-11-05 RX ADMIN — SIMVASTATIN 40 MILLIGRAM(S): 20 TABLET, FILM COATED ORAL at 21:29

## 2022-11-05 RX ADMIN — Medication 100 MILLIGRAM(S): at 12:04

## 2022-11-05 RX ADMIN — Medication 100 MILLIGRAM(S): at 21:29

## 2022-11-05 RX ADMIN — Medication 100 MILLIGRAM(S): at 05:07

## 2022-11-05 RX ADMIN — Medication 100 MILLIGRAM(S): at 05:08

## 2022-11-05 RX ADMIN — PANTOPRAZOLE SODIUM 40 MILLIGRAM(S): 20 TABLET, DELAYED RELEASE ORAL at 17:31

## 2022-11-05 RX ADMIN — Medication 100 MILLIGRAM(S): at 13:18

## 2022-11-05 RX ADMIN — SODIUM CHLORIDE 75 MILLILITER(S): 9 INJECTION INTRAMUSCULAR; INTRAVENOUS; SUBCUTANEOUS at 00:09

## 2022-11-05 RX ADMIN — SODIUM CHLORIDE 75 MILLILITER(S): 9 INJECTION INTRAMUSCULAR; INTRAVENOUS; SUBCUTANEOUS at 12:04

## 2022-11-05 NOTE — CONSULT NOTE ADULT - SUBJECTIVE AND OBJECTIVE BOX
[facility]  Tavon Dorantes PA-C  [address] [place] [id number] [phone]      Chief Complaint:  Patient is a 89y old  Female who presents with a chief complaint of bloody bowel movement (2022 11:33)      HPI:89 F hx dm , htn, hld, gastritis, cad, stents x 2, OA BIB daughter due to bloody bowel movement around 2 PM. Pt woke up with decreased PO intake, not feeling well, +chills. Complained of abdominal discomfort. Vomited. Took a nap. Woke up asking to go to bathroom. While walking with daughter she had bloody BM in diaper. On ASA.    Allergies:  No Known Allergies      Medications:  acetaminophen     Tablet .. 650 milliGRAM(s) Oral every 6 hours PRN  ATENolol  Tablet 25 milliGRAM(s) Oral daily  benzonatate 100 milliGRAM(s) Oral three times a day  dextrose 5%. 1000 milliLiter(s) IV Continuous <Continuous>  dextrose 5%. 1000 milliLiter(s) IV Continuous <Continuous>  dextrose 50% Injectable 25 Gram(s) IV Push once  dextrose 50% Injectable 12.5 Gram(s) IV Push once  dextrose 50% Injectable 25 Gram(s) IV Push once  dextrose Oral Gel 15 Gram(s) Oral once PRN  glucagon  Injectable 1 milliGRAM(s) IntraMuscular once  guaiFENesin Oral Liquid (Sugar-Free) 100 milliGRAM(s) Oral every 6 hours PRN  influenza  Vaccine (HIGH DOSE) 0.7 milliLiter(s) IntraMuscular once  insulin lispro (ADMELOG) corrective regimen sliding scale   SubCutaneous three times a day before meals  isosorbide   mononitrate ER Tablet (IMDUR) 30 milliGRAM(s) Oral daily  melatonin 3 milliGRAM(s) Oral at bedtime  pantoprazole  Injectable 40 milliGRAM(s) IV Push two times a day  simvastatin 40 milliGRAM(s) Oral at bedtime  sodium chloride 0.9%. 1000 milliLiter(s) IV Continuous <Continuous>      PMHX/PSHX:  Diabetes mellitus    CAD (coronary artery disease)    Hypertension    Hyperlipidemia    No pertinent past medical history    No pertinent past medical history    HTN (hypertension)    DM (diabetes mellitus)    CAD (coronary artery disease)    History of cholelithiasis    Hypercholesteremia    No significant past surgical history    Bilateral cataracts    No significant past surgical history    S/P coronary artery stent placement        Family history:  FH: MI (myocardial infarction) (Child)        Social History:     ROS:     General:  No wt loss, fevers, chills, night sweats, fatigue,   Eyes:  Good vision, no reported pain  ENT:  No sore throat, pain, runny nose, dysphagia  CV:  No pain, palpitations, hypo/hypertension  Resp:  No dyspnea, cough, tachypnea, wheezing  GI:  No pain, No nausea, No vomiting, No diarrhea, No constipation, No weight loss, No fever, No pruritis, No rectal bleeding, No tarry stools, No dysphagia,  :  No pain, bleeding, incontinence, nocturia  Muscle:  No pain, weakness  Neuro:  No weakness, tingling, memory problems  Psych:  No fatigue, insomnia, mood problems, depression  Endocrine:  No polyuria, polydipsia, cold/heat intolerance  Heme:  No petechiae, ecchymosis, easy bruisability  Skin:  No rash, tattoos, scars, edema      PHYSICAL EXAM:   Vital Signs:  Vital Signs Last 24 Hrs  T(C): 36.8 (2022 12:09), Max: 39.9 (2022 16:23)  T(F): 98.3 (2022 12:09), Max: 103.8 (2022 16:23)  HR: 65 (2022 12:09) (54 - 70)  BP: 152/67 (2022 12:09) (107/54 - 152/67)  BP(mean): --  RR: 16 (2022 12:09) (16 - 20)  SpO2: 94% (2022 12:09) (94% - 97%)    Parameters below as of 2022 12:09  Patient On (Oxygen Delivery Method): room air      Daily Height in cm: 149.86 (2022 15:33)    Daily Weight in k (2022 00:12)    GENERAL:  Appears stated age,   HEENT:  NC/AT,    CHEST:  Full & symmetric excursion,   HEART:  Regular rhythm  ABDOMEN:  Soft, non-tender, non-distended,   EXTEREMITIES:  no cyanosis,clubbing or edema  SKIN:  No rash  NEURO:  Alert,    LABS:                        9.1    6.29  )-----------( 186      ( 2022 11:08 )             29.0     11-04    142  |  109<H>  |  33<H>  ----------------------------<  180<H>  4.7   |  25  |  1.50<H>    Ca    9.6      2022 16:05    TPro  7.4  /  Alb  3.5  /  TBili  0.4  /  DBili  x   /  AST  22  /  ALT  20  /  AlkPhos  73  11-04    LIVER FUNCTIONS - ( 2022 16:05 )  Alb: 3.5 g/dL / Pro: 7.4 g/dL / ALK PHOS: 73 U/L / ALT: 20 U/L / AST: 22 U/L / GGT: x           PT/INR - ( 2022 16:05 )   PT: 13.9 sec;   INR: 1.19 ratio         PTT - ( 2022 16:05 )  PTT:28.3 sec  Urinalysis Basic - ( 2022 10:00 )    Color: Pale Yellow / Appearance: Clear / S.015 / pH: x  Gluc: x / Ketone: Negative  / Bili: Negative / Urobili: Negative   Blood: x / Protein: Negative / Nitrite: Negative   Leuk Esterase: Small / RBC: x / WBC x   Sq Epi: x / Non Sq Epi: x / Bacteria: x          Imaging:

## 2022-11-05 NOTE — PROGRESS NOTE ADULT - ASSESSMENT
89 F hx dm , htn, hld, gastritis, cad, stents x 2, OA BIB daughter due to bloody bowel movement around 2 PM. Pt woke up with decreased PO intake, not feeling well, +chills. Complained of abdominal discomfort. Vomited. Took a nap. Woke up asking to go to bathroom. While walking with daughter she had bloody BM in diaper. On ASA.    1 Acute blood loss anemia  - monitor cbc  - keep type and screen active  - GI fu   - cw protonix  - will monitor     2 COVID 19  - pulmonary fu   - symptomatic care     3 HILLARY  - monitor cr  - hold lasix  - gentle hydration    4 DM- monitor FS  - ISS     VENODYNES

## 2022-11-05 NOTE — PROGRESS NOTE ADULT - SUBJECTIVE AND OBJECTIVE BOX
Patient is a 89y old  Female who presents with a chief complaint of bloody bowel movement (2022 15:33)    Date of servie : 22 @ 18:39  INTERVAL HPI/OVERNIGHT EVENTS:  T(C): 36.8 (22 @ 12:09), Max: 36.9 (22 @ 04:49)  HR: 65 (22 @ 12:09) (54 - 65)  BP: 152/67 (22 @ 12:09) (107/54 - 152/67)  RR: 16 (22 @ 12:09) (16 - 20)  SpO2: 94% (22 @ 12:09) (94% - 97%)  Wt(kg): --  I&O's Summary    2022 08:01  -  2022 18:39  --------------------------------------------------------  IN: 360 mL / OUT: 300 mL / NET: 60 mL        LABS:                        9.4    6.29  )-----------( 201      ( 2022 16:20 )             30.7     11-04    142  |  109<H>  |  33<H>  ----------------------------<  180<H>  4.7   |  25  |  1.50<H>    Ca    9.6      2022 16:05    TPro  7.4  /  Alb  3.5  /  TBili  0.4  /  DBili  x   /  AST  22  /  ALT  20  /  AlkPhos  73  11-04    PT/INR - ( 2022 16:05 )   PT: 13.9 sec;   INR: 1.19 ratio         PTT - ( 2022 16:05 )  PTT:28.3 sec  Urinalysis Basic - ( 2022 10:00 )    Color: Pale Yellow / Appearance: Clear / S.015 / pH: x  Gluc: x / Ketone: Negative  / Bili: Negative / Urobili: Negative   Blood: x / Protein: Negative / Nitrite: Negative   Leuk Esterase: Small / RBC: 0-2 /HPF / WBC 3-5   Sq Epi: x / Non Sq Epi: Occasional / Bacteria: x      CAPILLARY BLOOD GLUCOSE      POCT Blood Glucose.: 143 mg/dL (2022 17:01)  POCT Blood Glucose.: 89 mg/dL (2022 12:42)  POCT Blood Glucose.: 105 mg/dL (2022 08:18)  POCT Blood Glucose.: 116 mg/dL (2022 00:06)  POCT Blood Glucose.: 133 mg/dL (2022 22:50)        Urinalysis Basic - ( 2022 10:00 )    Color: Pale Yellow / Appearance: Clear / S.015 / pH: x  Gluc: x / Ketone: Negative  / Bili: Negative / Urobili: Negative   Blood: x / Protein: Negative / Nitrite: Negative   Leuk Esterase: Small / RBC: 0-2 /HPF / WBC 3-5   Sq Epi: x / Non Sq Epi: Occasional / Bacteria: x        MEDICATIONS  (STANDING):  ATENolol  Tablet 25 milliGRAM(s) Oral daily  benzonatate 100 milliGRAM(s) Oral three times a day  dextrose 5%. 1000 milliLiter(s) (100 mL/Hr) IV Continuous <Continuous>  dextrose 5%. 1000 milliLiter(s) (50 mL/Hr) IV Continuous <Continuous>  dextrose 50% Injectable 25 Gram(s) IV Push once  dextrose 50% Injectable 12.5 Gram(s) IV Push once  dextrose 50% Injectable 25 Gram(s) IV Push once  glucagon  Injectable 1 milliGRAM(s) IntraMuscular once  influenza  Vaccine (HIGH DOSE) 0.7 milliLiter(s) IntraMuscular once  insulin lispro (ADMELOG) corrective regimen sliding scale   SubCutaneous three times a day before meals  isosorbide   mononitrate ER Tablet (IMDUR) 30 milliGRAM(s) Oral daily  melatonin 3 milliGRAM(s) Oral at bedtime  pantoprazole  Injectable 40 milliGRAM(s) IV Push two times a day  simvastatin 40 milliGRAM(s) Oral at bedtime  sodium chloride 0.9%. 1000 milliLiter(s) (75 mL/Hr) IV Continuous <Continuous>    MEDICATIONS  (PRN):  acetaminophen     Tablet .. 650 milliGRAM(s) Oral every 6 hours PRN Temp greater or equal to 38C (100.4F), Mild Pain (1 - 3)  dextrose Oral Gel 15 Gram(s) Oral once PRN Blood Glucose LESS THAN 70 milliGRAM(s)/deciliter  guaiFENesin Oral Liquid (Sugar-Free) 100 milliGRAM(s) Oral every 6 hours PRN Cough          PHYSICAL EXAM:  GENERAL: NAD, well-groomed, well-developed  HEAD:  Atraumatic, Normocephalic  CHEST/LUNG: Clear to percussion bilaterally; No rales, rhonchi, wheezing, or rubs  HEART: Regular rate and rhythm; No murmurs, rubs, or gallops  ABDOMEN: Soft, Nontender, Nondistended; Bowel sounds present  EXTREMITIES:  2+ Peripheral Pulses, No clubbing, cyanosis, or edema  LYMPH: No lymphadenopathy noted  SKIN: No rashes or lesions    Care Discussed with Consultants/Other Providers [ ] YES  [ ] NO

## 2022-11-05 NOTE — CONSULT NOTE ADULT - PROBLEM SELECTOR RECOMMENDATION 9
- No acute surgical intervention at this time, continue conservative management  - Trend H&H, monitor vitals  - Discussed w/ Dr. Meredith

## 2022-11-05 NOTE — CONSULT NOTE ADULT - SUBJECTIVE AND OBJECTIVE BOX
Date/Time Patient Seen:  		  Referring MD:   Data Reviewed	       Patient is a 89y old  Female who presents with a chief complaint of bloody bowel movement (04 Nov 2022 18:47)      Subjective/HPI   Chief Complaint: abdominal pain.    · Chief Complaint: The patient is a 89y Female complaining of abdominal pain.  · Unable to Obtain: Dementia   · HPI Objective Statement: 89 F hx dm , htn, hld, gastritis, cad, stents x 2, OA BIB daughter due to bloody bowel movement around 2 PM. Pt woke up with decreased PO intake, not feeling well, +chills. Complained of abdominal discomfort. Vomited. Took a nap. Woke up asking to go to bathroom. While walking with daughter she had bloody BM in diaper. On ASA.    PAST MEDICAL & SURGICAL HISTORY:  Diabetes mellitus    CAD (coronary artery disease)  2 cardiac stents placed    Hypertension    Hyperlipidemia    No pertinent past medical history    No pertinent past medical history    HTN (hypertension)    DM (diabetes mellitus)  diet-controlled    CAD (coronary artery disease)    History of cholelithiasis    Hypercholesteremia    No significant past surgical history    Bilateral cataracts    No significant past surgical history    S/P coronary artery stent placement          Medication list         MEDICATIONS  (STANDING):  ATENolol  Tablet 25 milliGRAM(s) Oral daily  benzonatate 100 milliGRAM(s) Oral three times a day  dextrose 5%. 1000 milliLiter(s) (100 mL/Hr) IV Continuous <Continuous>  dextrose 5%. 1000 milliLiter(s) (50 mL/Hr) IV Continuous <Continuous>  dextrose 50% Injectable 25 Gram(s) IV Push once  dextrose 50% Injectable 12.5 Gram(s) IV Push once  dextrose 50% Injectable 25 Gram(s) IV Push once  glucagon  Injectable 1 milliGRAM(s) IntraMuscular once  influenza  Vaccine (HIGH DOSE) 0.7 milliLiter(s) IntraMuscular once  insulin lispro (ADMELOG) corrective regimen sliding scale   SubCutaneous three times a day before meals  melatonin 3 milliGRAM(s) Oral at bedtime  pantoprazole  Injectable 40 milliGRAM(s) IV Push two times a day  simvastatin 40 milliGRAM(s) Oral at bedtime  sodium chloride 0.9%. 1000 milliLiter(s) (75 mL/Hr) IV Continuous <Continuous>    MEDICATIONS  (PRN):  acetaminophen     Tablet .. 650 milliGRAM(s) Oral every 6 hours PRN Temp greater or equal to 38C (100.4F), Mild Pain (1 - 3)  dextrose Oral Gel 15 Gram(s) Oral once PRN Blood Glucose LESS THAN 70 milliGRAM(s)/deciliter  guaiFENesin Oral Liquid (Sugar-Free) 100 milliGRAM(s) Oral every 6 hours PRN Cough         Vitals log        ICU Vital Signs Last 24 Hrs  T(C): 36.9 (05 Nov 2022 04:49), Max: 39.9 (04 Nov 2022 16:23)  T(F): 98.4 (05 Nov 2022 04:49), Max: 103.8 (04 Nov 2022 16:23)  HR: 64 (05 Nov 2022 04:49) (54 - 70)  BP: 135/83 (05 Nov 2022 04:49) (107/54 - 135/83)  BP(mean): --  ABP: --  ABP(mean): --  RR: 18 (05 Nov 2022 04:49) (18 - 20)  SpO2: 96% (05 Nov 2022 04:49) (94% - 97%)    O2 Parameters below as of 05 Nov 2022 04:49  Patient On (Oxygen Delivery Method): room air                 Input and Output:  I&O's Detail      Lab Data                        9.4    7.23  )-----------( 200      ( 05 Nov 2022 04:27 )             29.9     11-04    142  |  109<H>  |  33<H>  ----------------------------<  180<H>  4.7   |  25  |  1.50<H>    Ca    9.6      04 Nov 2022 16:05    TPro  7.4  /  Alb  3.5  /  TBili  0.4  /  DBili  x   /  AST  22  /  ALT  20  /  AlkPhos  73  11-04            Review of Systems	  weakness    Objective     Physical Examination        Pertinent Lab findings & Imaging      Shah:  NO   Adequate UO     I&O's Detail           Discussed with:     Cultures:	        Radiology      ACC: [id number] EXAM:  CT ABDOMEN AND PELVIS                        ACC: [id number] EXAM:  CT CHEST                          PROCEDURE DATE:  11/04/2022          INTERPRETATION:  CLINICAL INFORMATION: Cough, blood in stool    COMPARISON: CT chest abdomen and pelvis 5/24/2021    CONTRAST/COMPLICATIONS:  IV Contrast: NONE  90 cc administered   10 cc discarded  Oral Contrast: NONE  Complications: None reported at time of study completion    PROCEDURE:  CT of the Chest, Abdomen and Pelvis was performed.  Sagittal and coronal reformats were performed.    FINDINGS:  CHEST:  LUNGS AND LARGE AIRWAYS: Patent central airways. Mild subpleural   interstitial fibrotic changes. No focal consolidation. Calcified   granuloma right lower lobe.  PLEURA: No pleural effusion.  VESSELS: Normal caliber.  HEART: Mild cardiomegaly with coronary artery calcification.. No   pericardial effusion.  MEDIASTINUM AND RANDY: Subcentimeter and borderline mediastinal lymph   nodes likely reactive, similar to prior. Hilar evaluation limited without   IV contrast .multinodular thyroid similar to prior.  CHEST WALL AND LOWER NECK: Within normal limits.    ABDOMEN AND PELVIS:  LIVER: Within normal limits.  BILE DUCTS: Stable dilated common duct up to 1.4 cm  GALLBLADDER: Cholelithiasis.  SPLEEN: Within normal limits.  PANCREAS: Within normal limits.  ADRENALS: Within normal limits.  KIDNEYS/URETERS: Renal cortical hypodensity probable cyst.    BLADDER: Within normal limits.  REPRODUCTIVE ORGANS: Normal    BOWEL: No bowel obstruction. Colonic diverticulosis without acute   diverticulitis. Appendix normal  PERITONEUM: No ascites.  VESSELS: Nonaneurysmal.  RETROPERITONEUM/LYMPH NODES: No lymphadenopathy.  ABDOMINAL WALL: Small fat-containing umbilical hernia.  BONES: Degenerative changes.    IMPRESSION:  No acute findings chest abdomen and pelvis    Cholelithiasis. Stable common duct dilatation.    Colonic diverticulosis without scan evidence of acute diverticulitis    Additional findings as discussed    --- End of Report ---            URBAN MORTON MD; Attending Radiologist  This document has been electronically signed. Nov 4 2022  6:26PM                         Date/Time Patient Seen:  		  Referring MD:   Data Reviewed	       Patient is a 89y old  Female who presents with a chief complaint of bloody bowel movement (04 Nov 2022 18:47)      Subjective/HPI  in bed  seen and examined  vs noted  labs reviewed  imaging reviewed  ER provider note reviewed  on isolation for COVID       Chief Complaint: abdominal pain.    · Chief Complaint: The patient is a 89y Female complaining of abdominal pain.  · Unable to Obtain: Dementia   · HPI Objective Statement: 89 F hx dm , htn, hld, gastritis, cad, stents x 2, OA BIB daughter due to bloody bowel movement around 2 PM. Pt woke up with decreased PO intake, not feeling well, +chills. Complained of abdominal discomfort. Vomited. Took a nap. Woke up asking to go to bathroom. While walking with daughter she had bloody BM in diaper. On ASA.    PAST MEDICAL & SURGICAL HISTORY:  Diabetes mellitus    CAD (coronary artery disease)  2 cardiac stents placed    Hypertension    Hyperlipidemia    No pertinent past medical history    No pertinent past medical history    HTN (hypertension)    DM (diabetes mellitus)  diet-controlled    CAD (coronary artery disease)    History of cholelithiasis    Hypercholesteremia    No significant past surgical history    Bilateral cataracts    No significant past surgical history    S/P coronary artery stent placement          Medication list         MEDICATIONS  (STANDING):  ATENolol  Tablet 25 milliGRAM(s) Oral daily  benzonatate 100 milliGRAM(s) Oral three times a day  dextrose 5%. 1000 milliLiter(s) (100 mL/Hr) IV Continuous <Continuous>  dextrose 5%. 1000 milliLiter(s) (50 mL/Hr) IV Continuous <Continuous>  dextrose 50% Injectable 25 Gram(s) IV Push once  dextrose 50% Injectable 12.5 Gram(s) IV Push once  dextrose 50% Injectable 25 Gram(s) IV Push once  glucagon  Injectable 1 milliGRAM(s) IntraMuscular once  influenza  Vaccine (HIGH DOSE) 0.7 milliLiter(s) IntraMuscular once  insulin lispro (ADMELOG) corrective regimen sliding scale   SubCutaneous three times a day before meals  melatonin 3 milliGRAM(s) Oral at bedtime  pantoprazole  Injectable 40 milliGRAM(s) IV Push two times a day  simvastatin 40 milliGRAM(s) Oral at bedtime  sodium chloride 0.9%. 1000 milliLiter(s) (75 mL/Hr) IV Continuous <Continuous>    MEDICATIONS  (PRN):  acetaminophen     Tablet .. 650 milliGRAM(s) Oral every 6 hours PRN Temp greater or equal to 38C (100.4F), Mild Pain (1 - 3)  dextrose Oral Gel 15 Gram(s) Oral once PRN Blood Glucose LESS THAN 70 milliGRAM(s)/deciliter  guaiFENesin Oral Liquid (Sugar-Free) 100 milliGRAM(s) Oral every 6 hours PRN Cough         Vitals log        ICU Vital Signs Last 24 Hrs  T(C): 36.9 (05 Nov 2022 04:49), Max: 39.9 (04 Nov 2022 16:23)  T(F): 98.4 (05 Nov 2022 04:49), Max: 103.8 (04 Nov 2022 16:23)  HR: 64 (05 Nov 2022 04:49) (54 - 70)  BP: 135/83 (05 Nov 2022 04:49) (107/54 - 135/83)  BP(mean): --  ABP: --  ABP(mean): --  RR: 18 (05 Nov 2022 04:49) (18 - 20)  SpO2: 96% (05 Nov 2022 04:49) (94% - 97%)    O2 Parameters below as of 05 Nov 2022 04:49  Patient On (Oxygen Delivery Method): room air                 Input and Output:  I&O's Detail      Lab Data                        9.4    7.23  )-----------( 200      ( 05 Nov 2022 04:27 )             29.9     11-04    142  |  109<H>  |  33<H>  ----------------------------<  180<H>  4.7   |  25  |  1.50<H>    Ca    9.6      04 Nov 2022 16:05    TPro  7.4  /  Alb  3.5  /  TBili  0.4  /  DBili  x   /  AST  22  /  ALT  20  /  AlkPhos  73  11-04            Review of Systems	  weakness    Objective     Physical Examination    heart s1s2  lung dc BS  head nc  moves  cn grossly int      Pertinent Lab findings & Imaging      Shah:  NO   Adequate UO     I&O's Detail           Discussed with:     Cultures:	        Radiology      ACC: [id number] EXAM:  CT ABDOMEN AND PELVIS                        ACC: [id number] EXAM:  CT CHEST                          PROCEDURE DATE:  11/04/2022          INTERPRETATION:  CLINICAL INFORMATION: Cough, blood in stool    COMPARISON: CT chest abdomen and pelvis 5/24/2021    CONTRAST/COMPLICATIONS:  IV Contrast: NONE  90 cc administered   10 cc discarded  Oral Contrast: NONE  Complications: None reported at time of study completion    PROCEDURE:  CT of the Chest, Abdomen and Pelvis was performed.  Sagittal and coronal reformats were performed.    FINDINGS:  CHEST:  LUNGS AND LARGE AIRWAYS: Patent central airways. Mild subpleural   interstitial fibrotic changes. No focal consolidation. Calcified   granuloma right lower lobe.  PLEURA: No pleural effusion.  VESSELS: Normal caliber.  HEART: Mild cardiomegaly with coronary artery calcification.. No   pericardial effusion.  MEDIASTINUM AND RANDY: Subcentimeter and borderline mediastinal lymph   nodes likely reactive, similar to prior. Hilar evaluation limited without   IV contrast .multinodular thyroid similar to prior.  CHEST WALL AND LOWER NECK: Within normal limits.    ABDOMEN AND PELVIS:  LIVER: Within normal limits.  BILE DUCTS: Stable dilated common duct up to 1.4 cm  GALLBLADDER: Cholelithiasis.  SPLEEN: Within normal limits.  PANCREAS: Within normal limits.  ADRENALS: Within normal limits.  KIDNEYS/URETERS: Renal cortical hypodensity probable cyst.    BLADDER: Within normal limits.  REPRODUCTIVE ORGANS: Normal    BOWEL: No bowel obstruction. Colonic diverticulosis without acute   diverticulitis. Appendix normal  PERITONEUM: No ascites.  VESSELS: Nonaneurysmal.  RETROPERITONEUM/LYMPH NODES: No lymphadenopathy.  ABDOMINAL WALL: Small fat-containing umbilical hernia.  BONES: Degenerative changes.    IMPRESSION:  No acute findings chest abdomen and pelvis    Cholelithiasis. Stable common duct dilatation.    Colonic diverticulosis without scan evidence of acute diverticulitis    Additional findings as discussed    --- End of Report ---            URBAN MORTON MD; Attending Radiologist  This document has been electronically signed. Nov 4 2022  6:26PM

## 2022-11-05 NOTE — PROGRESS NOTE ADULT - SUBJECTIVE AND OBJECTIVE BOX
Patient is a 89y Female whom presented to the hospital with     PAST MEDICAL & SURGICAL HISTORY:  Diabetes mellitus      CAD (coronary artery disease)  2 cardiac stents placed      Hypertension      Hyperlipidemia      HTN (hypertension)      DM (diabetes mellitus)  diet-controlled      CAD (coronary artery disease)      History of cholelithiasis      Hypercholesteremia      Bilateral cataracts      S/P coronary artery stent placement          MEDICATIONS  (STANDING):  ATENolol  Tablet 25 milliGRAM(s) Oral daily  benzonatate 100 milliGRAM(s) Oral three times a day  dextrose 5%. 1000 milliLiter(s) (100 mL/Hr) IV Continuous <Continuous>  dextrose 5%. 1000 milliLiter(s) (50 mL/Hr) IV Continuous <Continuous>  dextrose 50% Injectable 25 Gram(s) IV Push once  dextrose 50% Injectable 12.5 Gram(s) IV Push once  dextrose 50% Injectable 25 Gram(s) IV Push once  glucagon  Injectable 1 milliGRAM(s) IntraMuscular once  influenza  Vaccine (HIGH DOSE) 0.7 milliLiter(s) IntraMuscular once  insulin lispro (ADMELOG) corrective regimen sliding scale   SubCutaneous three times a day before meals  isosorbide   mononitrate ER Tablet (IMDUR) 30 milliGRAM(s) Oral daily  melatonin 3 milliGRAM(s) Oral at bedtime  pantoprazole  Injectable 40 milliGRAM(s) IV Push two times a day  simvastatin 40 milliGRAM(s) Oral at bedtime  sodium chloride 0.9%. 1000 milliLiter(s) (75 mL/Hr) IV Continuous <Continuous>      Allergies    No Known Allergies    Intolerances        SOCIAL HISTORY:  Denies ETOh,Smoking,     FAMILY HISTORY:  FH: MI (myocardial infarction) (Child)        REVIEW OF SYSTEMS:    CONSTITUTIONAL: No weakness, fevers or chills  EYES/ENT: No visual changes;  no throat pain   NECK: No pain or stiffness  RESPIRATORY: No cough, wheezing, hemoptysis; No shortness of breath  CARDIOVASCULAR: No chest pain or palpitations  GASTROINTESTINAL: No abdominal or epigastric pain. No nausea, vomiting,     No diarrhea or constipation. No melena   GENITOURINARY: No dysuria, frequency or hematuria  NEUROLOGICAL: No numbness or weakness  SKIN: dry      VITAL:  T(C): , Max: 39.9 (22 @ 16:23)  T(F): , Max: 103.8 (22 @ 16:23)  HR: 65 (22 @ 12:09)  BP: 152/67 (22 @ 12:09)  BP(mean): --  RR: 16 (22 @ 12:09)  SpO2: 94% (22 @ 12:09)  Wt(kg): --    I and O's:     @ 08:01  -   @ 15:33  --------------------------------------------------------  IN: 360 mL / OUT: 300 mL / NET: 60 mL          PHYSICAL EXAM:    Constitutional: NAD  HEENT: conjunctive   clear   Neck:  No JVD  Respiratory: CTAB  Cardiovascular: S1 and S2  Gastrointestinal: BS+, soft, NT/ND  Extremities: No peripheral edema  Neurological: A/O x 3, no focal deficits  Psychiatric: Normal mood, normal affect  : No Shah  Skin: No rashes  Access: Not applicable    LABS:                        9.1    6.29  )-----------( 186      ( 2022 11:08 )             29.0     11-04    142  |  109<H>  |  33<H>  ----------------------------<  180<H>  4.7   |  25  |  1.50<H>    Ca    9.6      2022 16:05    TPro  7.4  /  Alb  3.5  /  TBili  0.4  /  DBili  x   /  AST  22  /  ALT  20  /  AlkPhos  73  11-04      Urine Studies:  Urinalysis Basic - ( 2022 10:00 )    Color: Pale Yellow / Appearance: Clear / S.015 / pH: x  Gluc: x / Ketone: Negative  / Bili: Negative / Urobili: Negative   Blood: x / Protein: Negative / Nitrite: Negative   Leuk Esterase: Small / RBC: 0-2 /HPF / WBC 3-5   Sq Epi: x / Non Sq Epi: Occasional / Bacteria: x            RADIOLOGY & ADDITIONAL STUDIES:                   Patient is a 89y Female whom presented to the hospital with ckd and puneet     PAST MEDICAL & SURGICAL HISTORY:  Diabetes mellitus      CAD (coronary artery disease)  2 cardiac stents placed      Hypertension      Hyperlipidemia      HTN (hypertension)      DM (diabetes mellitus)  diet-controlled      CAD (coronary artery disease)      History of cholelithiasis      Hypercholesteremia      Bilateral cataracts      S/P coronary artery stent placement          MEDICATIONS  (STANDING):  ATENolol  Tablet 25 milliGRAM(s) Oral daily  benzonatate 100 milliGRAM(s) Oral three times a day  dextrose 5%. 1000 milliLiter(s) (100 mL/Hr) IV Continuous <Continuous>  dextrose 5%. 1000 milliLiter(s) (50 mL/Hr) IV Continuous <Continuous>  dextrose 50% Injectable 25 Gram(s) IV Push once  dextrose 50% Injectable 12.5 Gram(s) IV Push once  dextrose 50% Injectable 25 Gram(s) IV Push once  glucagon  Injectable 1 milliGRAM(s) IntraMuscular once  influenza  Vaccine (HIGH DOSE) 0.7 milliLiter(s) IntraMuscular once  insulin lispro (ADMELOG) corrective regimen sliding scale   SubCutaneous three times a day before meals  isosorbide   mononitrate ER Tablet (IMDUR) 30 milliGRAM(s) Oral daily  melatonin 3 milliGRAM(s) Oral at bedtime  pantoprazole  Injectable 40 milliGRAM(s) IV Push two times a day  simvastatin 40 milliGRAM(s) Oral at bedtime  sodium chloride 0.9%. 1000 milliLiter(s) (75 mL/Hr) IV Continuous <Continuous>      Allergies    No Known Allergies    Intolerances        SOCIAL HISTORY:  Denies ETOh,Smoking,     FAMILY HISTORY:  FH: MI (myocardial infarction) (Child)        REVIEW OF SYSTEMS:    CONSTITUTIONAL: No weakness, fevers or chills  RESPIRATORY: No cough, wheezing, hemoptysis; No shortness of breath  CARDIOVASCULAR: No chest pain or palpitations  GASTROINTESTINAL: No abdominal or epigastric pain. No nausea, vomiting,     No diarrhea or constipation. No melena   SKIN: dry      VITAL:  T(C): , Max: 39.9 (22 @ 16:23)  T(F): , Max: 103.8 (22 @ 16:23)  HR: 65 (22 @ 12:09)  BP: 152/67 (22 @ 12:09)  BP(mean): --  RR: 16 (22 @ 12:09)  SpO2: 94% (22 @ 12:09)  Wt(kg): --    I and O's:     @ 08:01  -   @ 15:33  --------------------------------------------------------  IN: 360 mL / OUT: 300 mL / NET: 60 mL          PHYSICAL EXAM:    Constitutional: NAD  Respiratory: CTAB  Cardiovascular: S1 and S2  Gastrointestinal: BS+, soft, NT/ND  Extremities: No peripheral edema  Skin: dry   Access: Not applicable    LABS:                        9.1    6.29  )-----------( 186      ( 2022 11:08 )             29.0     11    142  |  109<H>  |  33<H>  ----------------------------<  180<H>  4.7   |  25  |  1.50<H>    Ca    9.6      2022 16:05    TPro  7.4  /  Alb  3.5  /  TBili  0.4  /  DBili  x   /  AST  22  /  ALT  20  /  AlkPhos  73  11-04      Urine Studies:  Urinalysis Basic - ( 2022 10:00 )    Color: Pale Yellow / Appearance: Clear / S.015 / pH: x  Gluc: x / Ketone: Negative  / Bili: Negative / Urobili: Negative   Blood: x / Protein: Negative / Nitrite: Negative   Leuk Esterase: Small / RBC: 0-2 /HPF / WBC 3-5   Sq Epi: x / Non Sq Epi: Occasional / Bacteria: x            RADIOLOGY & ADDITIONAL STUDIES:

## 2022-11-05 NOTE — CONSULT NOTE ADULT - ASSESSMENT
rectal bleed  acute blood loss anemia    hgb now stable  etiology unclear  ? diverticular  agree with holding asa  reg diet  poor candidate for colonoscopy at present

## 2022-11-05 NOTE — CONSULT NOTE ADULT - SUBJECTIVE AND OBJECTIVE BOX
Surgery Consult Note:    CC: Patient is a 89y old  Female who presents with a chief complaint of bloody bowel movement (05 Nov 2022 06:56)    HPI From ED: 89 F hx dm , htn, hld, gastritis, cad, stents x 2, OA BIB daughter due to bloody bowel movement around 2 PM. Pt woke up with decreased PO intake, not feeling well, +chills. Complained of abdominal discomfort. Vomited. Took a nap. Woke up asking to go to bathroom. While walking with daughter she had bloody BM in diaper. On ASA. (04 Nov 2022 18:47)    Interval HPI: Spoke w/ pt & her daughter. HPI as written above. Pt has never had a colonoscopy in the past, states she is "too scared." She states she has never noticed blood w/ bowel movements before. Admits to generalized weakness, fatigue, lightheadedness and ?chills. Also reports intermittent abdominal pain in the past, but states it would often disappear on its own, so she is unsure if its related. Denies any abdominal surgical hx.    Past Medical & Surgical History:  Diabetes mellitus  CAD (coronary artery disease)  2 cardiac stents placed  Hypertension  Hyperlipidemia  HTN (hypertension)  DM (diabetes mellitus)  diet-controlled  CAD (coronary artery disease)  History of cholelithiasis  Hypercholesteremia  Bilateral cataracts  S/P coronary artery stent placement    ROS:  General: admits to fatigue/weakness, dizziness, ?chills; denies night sweats, weight loss  HEENT: denies auditory or visual changes/disturbances, no vertigo, throat pain or dysphagia  Respiratory: denies shortness of breath, wheezing, dyspnea; denies cough or hemoptysis  Cardiac: denies chest pain, palpitations  GI: denies abdominal pain, bloating/fullness; no nausea, vomiting, hematemesis or heartburn  : denies urinary urgency/frequency, denies dysuria  Neuro: denies headache, syncope, paraesthesias, paralysis or tremors  Extremities: denies pain/swelling, arthralgias or weakness  Skin: denies pruritus, rashes  Psych: denies hallucinations, visual disturbances    Medications:  Home Medications:  acetaminophen 325 mg oral tablet: 2 tab(s) orally every 6 hours (04 Nov 2022 17:02)  aspirin 81 mg oral tablet: 1 tab(s) orally once a day (04 Nov 2022 17:02)  atenolol 25 mg oral tablet: 1 tab(s) orally once a day (04 Nov 2022 17:02)  isosorbide mononitrate 30 mg oral tablet, extended release: 1 tab(s) orally once a day (in the morning) (26 Aug 2022 10:00)  Lasix 20 mg oral tablet: 1 tab(s) orally once a day (26 Aug 2022 10:00)  losartan 25 mg oral tablet: 1 tab(s) orally once a day (26 Aug 2022 10:00)  losartan 25 mg oral tablet: 0.5 milligram(s) orally once a day (in the afternoon) (26 Aug 2022 10:00)  Melatonin 3 mg oral tablet: 1 tab(s) orally once a day (at bedtime) (26 Aug 2022 10:00)  metFORMIN 500 mg oral tablet: 1 tab(s) orally 2 times a day  Restart on 8/28/22 (26 Aug 2022 10:01)  multivitamin: orally once a day (26 Aug 2022 10:00)  Senna 8.6 mg oral tablet: 2 tab(s) orally once a day (at bedtime) (26 Aug 2022 10:00)  simvastatin 40 mg oral tablet: 1 tab(s) orally once a day (at bedtime) (26 Aug 2022 10:00)    MEDICATIONS  (STANDING):  ATENolol  Tablet 25 milliGRAM(s) Oral daily  benzonatate 100 milliGRAM(s) Oral three times a day  dextrose 5%. 1000 milliLiter(s) (100 mL/Hr) IV Continuous <Continuous>  dextrose 5%. 1000 milliLiter(s) (50 mL/Hr) IV Continuous <Continuous>  dextrose 50% Injectable 25 Gram(s) IV Push once  dextrose 50% Injectable 12.5 Gram(s) IV Push once  dextrose 50% Injectable 25 Gram(s) IV Push once  glucagon  Injectable 1 milliGRAM(s) IntraMuscular once  influenza  Vaccine (HIGH DOSE) 0.7 milliLiter(s) IntraMuscular once  insulin lispro (ADMELOG) corrective regimen sliding scale   SubCutaneous three times a day before meals  isosorbide   mononitrate ER Tablet (IMDUR) 30 milliGRAM(s) Oral daily  melatonin 3 milliGRAM(s) Oral at bedtime  pantoprazole  Injectable 40 milliGRAM(s) IV Push two times a daysimvastatin 40 milliGRAM(s) Oral at bedtime  sodium chloride 0.9%. 1000 milliLiter(s) (75 mL/Hr) IV Continuous <Continuous>    MEDICATIONS  (PRN):  acetaminophen     Tablet .. 650 milliGRAM(s) Oral every 6 hours PRN Temp greater or equal to 38C (100.4F), Mild Pain (1 - 3)  dextrose Oral Gel 15 Gram(s) Oral once PRN Blood Glucose LESS THAN 70 milliGRAM(s)/deciliter  guaiFENesin Oral Liquid (Sugar-Free) 100 milliGRAM(s) Oral every 6 hours PRN Cough    Allergies:  ALLERGIES: No Known Allergies  INTOLERANCES: None, unless indicated below    Social History:  Smoking: Yes [ ]  No [x]   ______ pack years  EtOH: Yes [ ]  No [x]  Social [ ]  Drugs: Yes [ ]  No [x]  _______    Family History:  FH: MI (myocardial infarction) (Child)    Vitals:  Vital Signs Last 24 Hrs  T(C): 36.9 (05 Nov 2022 04:49), Max: 39.9 (04 Nov 2022 16:23)  T(F): 98.4 (05 Nov 2022 04:49), Max: 103.8 (04 Nov 2022 16:23)  HR: 64 (05 Nov 2022 04:49) (54 - 70)  BP: 135/83 (05 Nov 2022 04:49) (107/54 - 135/83)  RR: 18 (05 Nov 2022 04:49) (18 - 20)  SpO2: 96% (05 Nov 2022 04:49) (94% - 97%)    Parameters below as of 05 Nov 2022 04:49  Patient On (Oxygen Delivery Method): room air    Physical Exam:  General: no acute distress, appears comfortable, well nourished, well-groomed  HEENT: normocephalic/atraumatic, vision grossly intact, hearing grossly intact  Neck: supple  Chest: lungs clear to auscultation bilaterally, good inspiratory effort  Heart: heart rate and rhythm regular  Abdomen: soft, obese, nontender, bowel sounds present; no guarding, rebound tenderness, or peritoneal signs  Extremities: no gross deformities, able to move all four extremities, negative Radha's sign  Neuro: alert & oriented x3, motor and sensory grossly intact  Skin: warm, dry, good turgor; no gross lesions    Labs:                        9.4    7.23  )-----------( 200      ( 05 Nov 2022 04:27 )             29.9     11-04    142  |  109<H>  |  33<H>  ----------------------------<  180<H>  4.7   |  25  |  1.50<H>    Ca    9.6      04 Nov 2022 16:05    TPro  7.4  /  Alb  3.5  /  TBili  0.4  /  DBili  x   /  AST  22  /  ALT  20  /  AlkPhos  73  11-04    PT/INR - ( 04 Nov 2022 16:05 )   PT: 13.9 sec;   INR: 1.19 ratio    PTT - ( 04 Nov 2022 16:05 )  PTT:28.3 sec    LIVER FUNCTIONS - ( 04 Nov 2022 16:05 )  Alb: 3.5 g/dL / Pro: 7.4 g/dL / ALK PHOS: 73 U/L / ALT: 20 U/L / AST: 22 U/L / GGT: x           Radiology & Additional Studies:  < from: CT Abdomen and Pelvis No Cont (11.04.22 @ 18:21) >    ACC: [id number] EXAM:  CT ABDOMEN AND PELVIS                        ACC: [id number] EXAM:  CT CHEST                          PROCEDURE DATE:  11/04/2022    INTERPRETATION:  CLINICAL INFORMATION: Cough, blood in stool    COMPARISON: CT chest abdomen and pelvis 5/24/2021    CONTRAST/COMPLICATIONS:  IV Contrast: NONE  90 cc administered   10 cc discarded  Oral Contrast: NONE  Complications: None reported at time of study completion    PROCEDURE:  CT of the Chest, Abdomen and Pelvis was performed.  Sagittal and coronal reformats were performed.    FINDINGS:  CHEST:  LUNGS AND LARGE AIRWAYS: Patent central airways. Mild subpleural   interstitial fibrotic changes. No focal consolidation. Calcified   granuloma right lower lobe.  PLEURA: No pleural effusion.  VESSELS: Normal caliber.  HEART: Mild cardiomegaly with coronary artery calcification.. No   pericardial effusion.  MEDIASTINUM AND RANDY: Subcentimeter and borderline mediastinal lymph   nodes likely reactive, similar to prior. Hilar evaluation limited without   IV contrast .multinodular thyroid similar to prior.  CHEST WALL AND LOWER NECK: Within normal limits.    ABDOMEN AND PELVIS:  LIVER: Within normal limits.  BILE DUCTS: Stable dilated common duct up to 1.4 cm  GALLBLADDER: Cholelithiasis.  SPLEEN: Within normal limits.  PANCREAS: Within normal limits.  ADRENALS: Within normal limits.  KIDNEYS/URETERS: Renal cortical hypodensity probable cyst.    BLADDER: Within normal limits.  REPRODUCTIVE ORGANS: Normal    BOWEL: No bowelobstruction. Colonic diverticulosis without acute   diverticulitis. Appendix normal  PERITONEUM: No ascites.  VESSELS: Nonaneurysmal.  RETROPERITONEUM/LYMPH NODES: No lymphadenopathy.  ABDOMINAL WALL: Small fat-containing umbilical hernia.  BONES: Degenerative changes.    IMPRESSION:  No acute findings chest abdomen and pelvis    Cholelithiasis. Stable common duct dilatation.    Colonic diverticulosis without scan evidence of acute diverticulitis    Additional findings as discussed    --- End of Report ---  URBAN MORTON MD; Attending Radiologist  This document has been electronically signed. Nov 4 2022  6:26PM    < end of copied text >

## 2022-11-05 NOTE — PATIENT PROFILE ADULT - FALL HARM RISK - RISK INTERVENTIONS

## 2022-11-05 NOTE — CONSULT NOTE ADULT - ASSESSMENT
89 y/o female w/ PMHx of diabetes, HTN, HLD, CAD (s/p stent) brought to the ED after having a bloody bowel movement x1. Found to have diverticulosis on CT, no evidence acute diverticulitis. H&H remains stable at this time.

## 2022-11-05 NOTE — PATIENT PROFILE ADULT - FUNCTIONAL ASSESSMENT - BASIC MOBILITY 6.
2-calculated by average/Not able to assess (calculate score using [facility] averaging method)

## 2022-11-05 NOTE — CONSULT NOTE ADULT - ASSESSMENT
The patient is a 89 year old male with a history of HTN, HL, DM, CAD s/p PCI who is admitted with GI bleed and COVID.    Plan:  - Recent cath 8/22 with patent stents and otherwise distal small vessel disease  - Continue atenolol 25 mg daily (mildly bradycardic at times)  - Hold losartan due to [place]  - Continue imdur 30 mg daily  - Hold aspirin  - Monitor hemoglobin  - COVID positive  - ID and GI evals

## 2022-11-05 NOTE — CONSULT NOTE ADULT - ASSESSMENT
89 F hx dm , htn, hld, gastritis, cad, stents x 2, OA BIB daughter due to bloody bowel movement 89 F hx dm , htn, hld, gastritis, cad, stents x 2, OA BIB daughter due to bloody bowel movement    diverticular disease  eval GI Bleed  HTN  HLD  CAD  Dementia  COVID  Lung Granuloma  OP  OA  DM    GI eval  serial Hgb  monitor VS and HD  DM care and serial FS  assist with needs - monitor mental status - GOC discussion - oral hygiene - skin care  isolation for COVID  monitor O2 sat - no indication for Decadron at present  dvt p - seq teds  [facility] rx regimen and BP control  ACAP - Robitussin PRN for sx management

## 2022-11-05 NOTE — CONSULT NOTE ADULT - SUBJECTIVE AND OBJECTIVE BOX
History of Present Illness: The patient is a 89 year old male with a history of HTN, HL, DM, CAD s/p PCI who presents with GI bleed. The patient has had malaise, chills, decreased PO intake. She was found to be COVID positive.    Past Medical/Surgical History:  HTN, HL, DM, CAD s/p PCI    Medications:  Home Medications:  acetaminophen 325 mg oral tablet: 2 tab(s) orally every 6 hours (04 Nov 2022 17:02)  aspirin 81 mg oral tablet: 1 tab(s) orally once a day (04 Nov 2022 17:02)  atenolol 25 mg oral tablet: 1 tab(s) orally once a day (04 Nov 2022 17:02)  isosorbide mononitrate 30 mg oral tablet, extended release: 1 tab(s) orally once a day (in the morning) (26 Aug 2022 10:00)  Lasix 20 mg oral tablet: 1 tab(s) orally once a day (26 Aug 2022 10:00)  losartan 25 mg oral tablet: 1 tab(s) orally once a day (26 Aug 2022 10:00)  losartan 25 mg oral tablet: 0.5 milligram(s) orally once a day (in the afternoon) (26 Aug 2022 10:00)  Melatonin 3 mg oral tablet: 1 tab(s) orally once a day (at bedtime) (26 Aug 2022 10:00)  metFORMIN 500 mg oral tablet: 1 tab(s) orally 2 times a day  Restart on 8/28/22 (26 Aug 2022 10:01)  multivitamin: orally once a day (26 Aug 2022 10:00)  Senna 8.6 mg oral tablet: 2 tab(s) orally once a day (at bedtime) (26 Aug 2022 10:00)  simvastatin 40 mg oral tablet: 1 tab(s) orally once a day (at bedtime) (26 Aug 2022 10:00)      Family History: Non-contributory family history of premature cardiovascular atherosclerotic disease    Social History: No tobacco, alcohol or drug use    Review of Systems:  General: No fevers, chills, weight gain  Skin: No rashes, color changes  Cardiovascular: No chest pain, orthopnea  Respiratory: No shortness of breath, cough  Gastrointestinal: No nausea, abdominal pain  Genitourinary: No incontinence, pain with urination  Musculoskeletal: No pain, swelling, decreased range of motion  Neurological: No headache, weakness  Psychiatric: No depression, anxiety  Endocrine: No weight gain, increased thirst  All other systems are comprehensively negative.    Physical Exam:  Vitals:        Vital Signs Last 24 Hrs  T(C): 36.9 (05 Nov 2022 04:49), Max: 39.9 (04 Nov 2022 16:23)  T(F): 98.4 (05 Nov 2022 04:49), Max: 103.8 (04 Nov 2022 16:23)  HR: 64 (05 Nov 2022 04:49) (54 - 70)  BP: 135/83 (05 Nov 2022 04:49) (107/54 - 135/83)  BP(mean): --  RR: 18 (05 Nov 2022 04:49) (18 - 20)  SpO2: 96% (05 Nov 2022 04:49) (94% - 97%)  Parameters below as of 05 Nov 2022 04:49  Patient On (Oxygen Delivery Method): room air  General: NAD  HEENT: MMM  Neck: No JVD, no carotid bruit  Lungs: CTAB  CV: RRR, nl S1/S2, no M/R/G  Abdomen: S/NT/ND, +BS  Extremities: No LE edema, no cyanosis  Neuro: AAOx3, non-focal  Skin: No rash    Labs:                        9.4    7.23  )-----------( 200      ( 05 Nov 2022 04:27 )             29.9     11-04    142  |  109<H>  |  33<H>  ----------------------------<  180<H>  4.7   |  25  |  1.50<H>    Ca    9.6      04 Nov 2022 16:05    TPro  7.4  /  Alb  3.5  /  TBili  0.4  /  DBili  x   /  AST  22  /  ALT  20  /  AlkPhos  73  11-04        PT/INR - ( 04 Nov 2022 16:05 )   PT: 13.9 sec;   INR: 1.19 ratio         PTT - ( 04 Nov 2022 16:05 )  PTT:28.3 sec    ECG/Telemetry: Sinus bradycardia, normal axis, no ST abnormality

## 2022-11-06 LAB
ALBUMIN SERPL ELPH-MCNC: 2.7 G/DL — LOW (ref 3.3–5)
ALP SERPL-CCNC: 50 U/L — SIGNIFICANT CHANGE UP (ref 40–120)
ALT FLD-CCNC: 29 U/L — SIGNIFICANT CHANGE UP (ref 12–78)
ANION GAP SERPL CALC-SCNC: 4 MMOL/L — LOW (ref 5–17)
AST SERPL-CCNC: 89 U/L — HIGH (ref 15–37)
BILIRUB SERPL-MCNC: 0.3 MG/DL — SIGNIFICANT CHANGE UP (ref 0.2–1.2)
BUN SERPL-MCNC: 16 MG/DL — SIGNIFICANT CHANGE UP (ref 7–23)
CALCIUM SERPL-MCNC: 8 MG/DL — LOW (ref 8.5–10.1)
CHLORIDE SERPL-SCNC: 112 MMOL/L — HIGH (ref 96–108)
CO2 SERPL-SCNC: 27 MMOL/L — SIGNIFICANT CHANGE UP (ref 22–31)
CREAT SERPL-MCNC: 0.82 MG/DL — SIGNIFICANT CHANGE UP (ref 0.5–1.3)
CULTURE RESULTS: SIGNIFICANT CHANGE UP
EGFR: 68 ML/MIN/1.73M2 — SIGNIFICANT CHANGE UP
GLUCOSE SERPL-MCNC: 151 MG/DL — HIGH (ref 70–99)
HCT VFR BLD CALC: 26.4 % — LOW (ref 34.5–45)
HGB BLD-MCNC: 8.3 G/DL — LOW (ref 11.5–15.5)
MCHC RBC-ENTMCNC: 30.7 PG — SIGNIFICANT CHANGE UP (ref 27–34)
MCHC RBC-ENTMCNC: 31.4 GM/DL — LOW (ref 32–36)
MCV RBC AUTO: 97.8 FL — SIGNIFICANT CHANGE UP (ref 80–100)
NRBC # BLD: 0 /100 WBCS — SIGNIFICANT CHANGE UP (ref 0–0)
PLATELET # BLD AUTO: 173 K/UL — SIGNIFICANT CHANGE UP (ref 150–400)
POTASSIUM SERPL-MCNC: 4 MMOL/L — SIGNIFICANT CHANGE UP (ref 3.5–5.3)
POTASSIUM SERPL-SCNC: 4 MMOL/L — SIGNIFICANT CHANGE UP (ref 3.5–5.3)
PROT SERPL-MCNC: 5.9 G/DL — LOW (ref 6–8.3)
RBC # BLD: 2.7 M/UL — LOW (ref 3.8–5.2)
RBC # FLD: 12.7 % — SIGNIFICANT CHANGE UP (ref 10.3–14.5)
SODIUM SERPL-SCNC: 143 MMOL/L — SIGNIFICANT CHANGE UP (ref 135–145)
SPECIMEN SOURCE: SIGNIFICANT CHANGE UP
WBC # BLD: 5.42 K/UL — SIGNIFICANT CHANGE UP (ref 3.8–10.5)
WBC # FLD AUTO: 5.42 K/UL — SIGNIFICANT CHANGE UP (ref 3.8–10.5)

## 2022-11-06 RX ADMIN — SODIUM CHLORIDE 75 MILLILITER(S): 9 INJECTION INTRAMUSCULAR; INTRAVENOUS; SUBCUTANEOUS at 06:06

## 2022-11-06 RX ADMIN — Medication 100 MILLIGRAM(S): at 06:13

## 2022-11-06 RX ADMIN — SODIUM CHLORIDE 75 MILLILITER(S): 9 INJECTION INTRAMUSCULAR; INTRAVENOUS; SUBCUTANEOUS at 11:28

## 2022-11-06 RX ADMIN — Medication 1: at 17:22

## 2022-11-06 RX ADMIN — Medication 1: at 12:27

## 2022-11-06 RX ADMIN — ISOSORBIDE MONONITRATE 30 MILLIGRAM(S): 60 TABLET, EXTENDED RELEASE ORAL at 11:28

## 2022-11-06 RX ADMIN — PANTOPRAZOLE SODIUM 40 MILLIGRAM(S): 20 TABLET, DELAYED RELEASE ORAL at 06:13

## 2022-11-06 RX ADMIN — Medication 100 MILLIGRAM(S): at 13:20

## 2022-11-06 RX ADMIN — Medication 100 MILLIGRAM(S): at 21:32

## 2022-11-06 RX ADMIN — ATENOLOL 25 MILLIGRAM(S): 25 TABLET ORAL at 06:13

## 2022-11-06 RX ADMIN — Medication 3 MILLIGRAM(S): at 21:32

## 2022-11-06 RX ADMIN — PANTOPRAZOLE SODIUM 40 MILLIGRAM(S): 20 TABLET, DELAYED RELEASE ORAL at 17:23

## 2022-11-06 RX ADMIN — SIMVASTATIN 40 MILLIGRAM(S): 20 TABLET, FILM COATED ORAL at 21:32

## 2022-11-06 NOTE — PROGRESS NOTE ADULT - PROBLEM SELECTOR PLAN 1
- Care per primary team  - No acute surgical interventions required at this time  - GI consult noted  - Awaiting AM labs, trending H&H  - Monitor vitals  - Above to be discussed with Dr. Meredith    Surgical Team  Spectralink: [id number]

## 2022-11-06 NOTE — PROGRESS NOTE ADULT - ASSESSMENT
rectal bleed  acute blood loss anemia    hgb now stable  etiology unclear  ? diverticular; now resolving  agree with holding asa  reg diet  poor candidate for colonoscopy at present    I reviewed the overnight course of events on the unit, re-confirming the patient history. I discussed the care with the patient and their family  Differential diagnosis and plan of care discussed with patient after the evaluation  35 minutes spent on total encounter of which more than fifty percent of the encounter was spent counseling and/or coordinating care by the attending physician.  Advanced care planning was discussed with patient and family.  Advanced care planning forms were reviewed and discussed.  Risks, benefits and alternatives of gastroenterologic procedures were discussed in detail and all questions were answered.

## 2022-11-06 NOTE — PROGRESS NOTE ADULT - SUBJECTIVE AND OBJECTIVE BOX
Patient is a 89y Female whom presented to the hospital with ckd and puneet     PAST MEDICAL & SURGICAL HISTORY:  Diabetes mellitus      CAD (coronary artery disease)  2 cardiac stents placed      Hypertension      Hyperlipidemia      HTN (hypertension)      DM (diabetes mellitus)  diet-controlled      CAD (coronary artery disease)      History of cholelithiasis      Hypercholesteremia      Bilateral cataracts      S/P coronary artery stent placement          MEDICATIONS  (STANDING):  ATENolol  Tablet 25 milliGRAM(s) Oral daily  benzonatate 100 milliGRAM(s) Oral three times a day  dextrose 5%. 1000 milliLiter(s) (100 mL/Hr) IV Continuous <Continuous>  dextrose 5%. 1000 milliLiter(s) (50 mL/Hr) IV Continuous <Continuous>  dextrose 50% Injectable 25 Gram(s) IV Push once  dextrose 50% Injectable 12.5 Gram(s) IV Push once  dextrose 50% Injectable 25 Gram(s) IV Push once  glucagon  Injectable 1 milliGRAM(s) IntraMuscular once  influenza  Vaccine (HIGH DOSE) 0.7 milliLiter(s) IntraMuscular once  insulin lispro (ADMELOG) corrective regimen sliding scale   SubCutaneous three times a day before meals  isosorbide   mononitrate ER Tablet (IMDUR) 30 milliGRAM(s) Oral daily  melatonin 3 milliGRAM(s) Oral at bedtime  pantoprazole  Injectable 40 milliGRAM(s) IV Push two times a day  simvastatin 40 milliGRAM(s) Oral at bedtime  sodium chloride 0.9%. 1000 milliLiter(s) (75 mL/Hr) IV Continuous <Continuous>      Allergies    No Known Allergies    Intolerances        SOCIAL HISTORY:  Denies ETOh,Smoking,     FAMILY HISTORY:  FH: MI (myocardial infarction) (Child)        REVIEW OF SYSTEMS:    CONSTITUTIONAL: No weakness, fevers or chills  RESPIRATORY: No cough, wheezing, hemoptysis; No shortness of breath  CARDIOVASCULAR: No chest pain or palpitations  GASTROINTESTINAL: No abdominal or epigastric pain. No nausea, vomiting,     No diarrhea or constipation. No melena   SKIN: dry                            8.3    5.42  )-----------( 173      ( 2022 11:38 )             26.4       CBC Full  -  ( 2022 11:38 )  WBC Count : 5.42 K/uL  RBC Count : 2.70 M/uL  Hemoglobin : 8.3 g/dL  Hematocrit : 26.4 %  Platelet Count - Automated : 173 K/uL  Mean Cell Volume : 97.8 fl  Mean Cell Hemoglobin : 30.7 pg  Mean Cell Hemoglobin Concentration : 31.4 gm/dL  Auto Neutrophil # : x  Auto Lymphocyte # : x  Auto Monocyte # : x  Auto Eosinophil # : x  Auto Basophil # : x  Auto Neutrophil % : x  Auto Lymphocyte % : x  Auto Monocyte % : x  Auto Eosinophil % : x  Auto Basophil % : x      1106    143  |  112<H>  |  16  ----------------------------<  151<H>  4.0   |  27  |  0.82    Ca    8.0<L>      2022 11:38    TPro  5.9<L>  /  Alb  2.7<L>  /  TBili  0.3  /  DBili  x   /  AST  89<H>  /  ALT  29  /  AlkPhos  50        CAPILLARY BLOOD GLUCOSE      POCT Blood Glucose.: 179 mg/dL (2022 12:21)  POCT Blood Glucose.: 108 mg/dL (2022 08:12)  POCT Blood Glucose.: 124 mg/dL (2022 21:18)  POCT Blood Glucose.: 143 mg/dL (2022 17:01)      Vital Signs Last 24 Hrs  T(C): 37.2 (2022 11:22), Max: 37.4 (2022 04:02)  T(F): 98.9 (2022 11:22), Max: 99.4 (2022 04:02)  HR: 52 (2022 11:22) (52 - 100)  BP: 117/52 (2022 11:22) (117/52 - 155/66)  BP(mean): --  RR: 16 (2022 11:22) (16 - 18)  SpO2: 94% (2022 11:22) (94% - 100%)    Parameters below as of 2022 11:22  Patient On (Oxygen Delivery Method): room air        Urinalysis Basic - ( 2022 10:00 )    Color: Pale Yellow / Appearance: Clear / S.015 / pH: x  Gluc: x / Ketone: Negative  / Bili: Negative / Urobili: Negative   Blood: x / Protein: Negative / Nitrite: Negative   Leuk Esterase: Small / RBC: 0-2 /HPF / WBC 3-5   Sq Epi: x / Non Sq Epi: Occasional / Bacteria: x        PT/INR - ( 2022 16:05 )   PT: 13.9 sec;   INR: 1.19 ratio         PTT - ( 2022 16:05 )  PTT:28.3 sec      PHYSICAL EXAM:    Constitutional: NAD  Respiratory: CTAB  Cardiovascular: S1 and S2  Gastrointestinal: BS+, soft, NT/ND  Extremities: No peripheral edema  Skin: dry   Access: Not applicable

## 2022-11-06 NOTE — PROGRESS NOTE ADULT - SUBJECTIVE AND OBJECTIVE BOX
Patient is a 89y old  Female who presents with a chief complaint of bloody bowel movement (2022 11:16)    Date of servie : 22 @ 17:05  INTERVAL HPI/OVERNIGHT EVENTS:  T(C): 37.2 (22 @ 11:22), Max: 37.4 (22 @ 04:02)  HR: 52 (22 @ 11:22) (52 - 100)  BP: 117/52 (22 @ 11:22) (117/52 - 155/66)  RR: 16 (22 @ 11:22) (16 - 18)  SpO2: 94% (22 @ 11:22) (94% - 100%)  Wt(kg): --  I&O's Summary    2022 08:01  -  2022 07:00  --------------------------------------------------------  IN: 1185 mL / OUT: 1400 mL / NET: -215 mL    2022 07:01  -  2022 17:05  --------------------------------------------------------  IN: 360 mL / OUT: 0 mL / NET: 360 mL        LABS:                        8.3    5.42  )-----------( 173      ( 2022 11:38 )             26.4     1106    143  |  112<H>  |  16  ----------------------------<  151<H>  4.0   |  27  |  0.82    Ca    8.0<L>      2022 11:38    TPro  5.9<L>  /  Alb  2.7<L>  /  TBili  0.3  /  DBili  x   /  AST  89<H>  /  ALT  29  /  AlkPhos  50  1106      Urinalysis Basic - ( 2022 10:00 )    Color: Pale Yellow / Appearance: Clear / S.015 / pH: x  Gluc: x / Ketone: Negative  / Bili: Negative / Urobili: Negative   Blood: x / Protein: Negative / Nitrite: Negative   Leuk Esterase: Small / RBC: 0-2 /HPF / WBC 3-5   Sq Epi: x / Non Sq Epi: Occasional / Bacteria: x      CAPILLARY BLOOD GLUCOSE      POCT Blood Glucose.: 160 mg/dL (2022 16:52)  POCT Blood Glucose.: 179 mg/dL (2022 12:21)  POCT Blood Glucose.: 108 mg/dL (2022 08:12)  POCT Blood Glucose.: 124 mg/dL (2022 21:18)        Urinalysis Basic - ( 2022 10:00 )    Color: Pale Yellow / Appearance: Clear / S.015 / pH: x  Gluc: x / Ketone: Negative  / Bili: Negative / Urobili: Negative   Blood: x / Protein: Negative / Nitrite: Negative   Leuk Esterase: Small / RBC: 0-2 /HPF / WBC 3-5   Sq Epi: x / Non Sq Epi: Occasional / Bacteria: x        MEDICATIONS  (STANDING):  ATENolol  Tablet 25 milliGRAM(s) Oral daily  benzonatate 100 milliGRAM(s) Oral three times a day  dextrose 5%. 1000 milliLiter(s) (100 mL/Hr) IV Continuous <Continuous>  dextrose 5%. 1000 milliLiter(s) (50 mL/Hr) IV Continuous <Continuous>  dextrose 50% Injectable 25 Gram(s) IV Push once  dextrose 50% Injectable 12.5 Gram(s) IV Push once  dextrose 50% Injectable 25 Gram(s) IV Push once  glucagon  Injectable 1 milliGRAM(s) IntraMuscular once  influenza  Vaccine (HIGH DOSE) 0.7 milliLiter(s) IntraMuscular once  insulin lispro (ADMELOG) corrective regimen sliding scale   SubCutaneous three times a day before meals  isosorbide   mononitrate ER Tablet (IMDUR) 30 milliGRAM(s) Oral daily  melatonin 3 milliGRAM(s) Oral at bedtime  pantoprazole  Injectable 40 milliGRAM(s) IV Push two times a day  simvastatin 40 milliGRAM(s) Oral at bedtime  sodium chloride 0.9%. 1000 milliLiter(s) (75 mL/Hr) IV Continuous <Continuous>    MEDICATIONS  (PRN):  acetaminophen     Tablet .. 650 milliGRAM(s) Oral every 6 hours PRN Temp greater or equal to 38C (100.4F), Mild Pain (1 - 3)  dextrose Oral Gel 15 Gram(s) Oral once PRN Blood Glucose LESS THAN 70 milliGRAM(s)/deciliter  guaiFENesin Oral Liquid (Sugar-Free) 100 milliGRAM(s) Oral every 6 hours PRN Cough          PHYSICAL EXAM:  GENERAL: NAD, well-groomed, well-developed  HEAD:  Atraumatic, Normocephalic  CHEST/LUNG: Clear to percussion bilaterally; No rales, rhonchi, wheezing, or rubs  HEART: Regular rate and rhythm; No murmurs, rubs, or gallops  ABDOMEN: Soft, Nontender, Nondistended; Bowel sounds present  EXTREMITIES:  2+ Peripheral Pulses, No clubbing, cyanosis, or edema  LYMPH: No lymphadenopathy noted  SKIN: No rashes or lesions    Care Discussed with Consultants/Other Providers [ ] YES  [ ] NO

## 2022-11-06 NOTE — PROGRESS NOTE ADULT - ASSESSMENT
89 F hx dm , htn, hld, gastritis, cad, stents x 2, OA BIB daughter due to bloody bowel movement    diverticular disease  eval GI Bleed  HTN  HLD  CAD  Dementia  COVID  Lung Granuloma  OP  OA  DM    GI - Surgery eval noted - Cardio eval noted -   on IVF    GI eval  serial Hgb  monitor VS and HD  DM care and serial FS  assist with needs - monitor mental status - GOC discussion - oral hygiene - skin care  isolation for COVID  monitor O2 sat - no indication for Decadron at present  dvt p - seq teds  [facility] rx regimen and BP control  ACAP - Robitussin PRN for sx management

## 2022-11-06 NOTE — PROGRESS NOTE ADULT - SUBJECTIVE AND OBJECTIVE BOX
Pt seen and examined at bedside, in isolation covid unit. Denies any overnight events, no complaints at this time. Vital signs stable. Tolerating diet. Denies any bowel movement since admission. Per RN - pt has a persistent cough (+ covid).  Denies any abdominal pain, nausea, vomiting, headache, chest pain, palpitations, shortness of breath, weakness or fatigue.    T(C): 37.4 (22 @ 04:02), Max: 37.4 (22 @ 04:02)  HR: 64 (22 @ 04:02) (64 - 100)  BP: 143/80 (22 @ 04:02) (143/80 - 155/66)  RR: 18 (22 @ 04:02) (16 - 18)  SpO2: 95% (22 @ 04:02) (94% - 100%)    PHYSICAL EXAM:  General: no acute distress, appears comfortable  HEENT: normocephalic, anicteric  Pulm: non labored respirations  Cardio: RRR  Abdomen: soft, nontender, nondistended, (+) BS  Extremities: no calf tenderness noted    I&O's Detail    2022 08:01  -  2022 07:00  --------------------------------------------------------  IN:    Oral Fluid: 360 mL    sodium chloride 0.9%: 825 mL  Total IN: 1185 mL    OUT:    Voided (mL): 1400 mL  Total OUT: 1400 mL    Total NET: -215 mL    LABS:                        9.4    6.29  )-----------( 201      ( 2022 16:20 )             30.7     11-    142  |  109<H>  |  33<H>  ----------------------------<  180<H>  4.7   |  25  |  1.50<H>    Ca    9.6      2022 16:05    TPro  7.4  /  Alb  3.5  /  TBili  0.4  /  DBili  x   /  AST  22  /  ALT  20  /  AlkPhos  73  11-04    PT/INR - ( 2022 16:05 )   PT: 13.9 sec;   INR: 1.19 ratio         PTT - ( 2022 16:05 )  PTT:28.3 sec  Urinalysis Basic - ( 2022 10:00 )    Color: Pale Yellow / Appearance: Clear / S.015 / pH: x  Gluc: x / Ketone: Negative  / Bili: Negative / Urobili: Negative   Blood: x / Protein: Negative / Nitrite: Negative   Leuk Esterase: Small / RBC: 0-2 /HPF / WBC 3-5   Sq Epi: x / Non Sq Epi: Occasional / Bacteria: x    Culture - Blood (collected 2022 16:05)  Source: .Blood Blood-Peripheral  Preliminary Report (2022 01:10):    No growth to date.    Culture - Blood (collected 2022 16:00)  Source: .Blood Blood-Peripheral  Preliminary Report (2022 01:10):    No growth to date.    CAPILLARY BLOOD GLUCOSE  POCT Blood Glucose.: 108 mg/dL (2022 08:12)  POCT Blood Glucose.: 124 mg/dL (2022 21:18)  POCT Blood Glucose.: 143 mg/dL (2022 17:01)  POCT Blood Glucose.: 89 mg/dL (2022 12:42)    MEDICATIONS:  acetaminophen     Tablet .. 650 milliGRAM(s) Oral every 6 hours PRN  ATENolol  Tablet 25 milliGRAM(s) Oral daily  benzonatate 100 milliGRAM(s) Oral three times a day  dextrose 5%. 1000 milliLiter(s) IV Continuous <Continuous>  dextrose 5%. 1000 milliLiter(s) IV Continuous <Continuous>  dextrose 50% Injectable 25 Gram(s) IV Push once  dextrose 50% Injectable 12.5 Gram(s) IV Push once  dextrose 50% Injectable 25 Gram(s) IV Push once  dextrose Oral Gel 15 Gram(s) Oral once PRN  glucagon  Injectable 1 milliGRAM(s) IntraMuscular once  guaiFENesin Oral Liquid (Sugar-Free) 100 milliGRAM(s) Oral every 6 hours PRN  influenza  Vaccine (HIGH DOSE) 0.7 milliLiter(s) IntraMuscular once  insulin lispro (ADMELOG) corrective regimen sliding scale   SubCutaneous three times a day before meals  isosorbide   mononitrate ER Tablet (IMDUR) 30 milliGRAM(s) Oral daily  melatonin 3 milliGRAM(s) Oral at bedtime  pantoprazole  Injectable 40 milliGRAM(s) IV Push two times a day  simvastatin 40 milliGRAM(s) Oral at bedtime  sodium chloride 0.9%. 1000 milliLiter(s) IV Continuous <Continuous>

## 2022-11-06 NOTE — PROGRESS NOTE ADULT - SUBJECTIVE AND OBJECTIVE BOX
Date/Time Patient Seen:  		  Referring MD:   Data Reviewed	       Patient is a 89y old  Female who presents with a chief complaint of bloody bowel movement (05 Nov 2022 18:39)      Subjective/HPI     PAST MEDICAL & SURGICAL HISTORY:  Diabetes mellitus    CAD (coronary artery disease)  2 cardiac stents placed    Hypertension    Hyperlipidemia    No pertinent past medical history    No pertinent past medical history    HTN (hypertension)    DM (diabetes mellitus)  diet-controlled    CAD (coronary artery disease)    History of cholelithiasis    Hypercholesteremia    No significant past surgical history    Bilateral cataracts    No significant past surgical history    S/P coronary artery stent placement          Medication list         MEDICATIONS  (STANDING):  ATENolol  Tablet 25 milliGRAM(s) Oral daily  benzonatate 100 milliGRAM(s) Oral three times a day  dextrose 5%. 1000 milliLiter(s) (100 mL/Hr) IV Continuous <Continuous>  dextrose 5%. 1000 milliLiter(s) (50 mL/Hr) IV Continuous <Continuous>  dextrose 50% Injectable 25 Gram(s) IV Push once  dextrose 50% Injectable 12.5 Gram(s) IV Push once  dextrose 50% Injectable 25 Gram(s) IV Push once  glucagon  Injectable 1 milliGRAM(s) IntraMuscular once  influenza  Vaccine (HIGH DOSE) 0.7 milliLiter(s) IntraMuscular once  insulin lispro (ADMELOG) corrective regimen sliding scale   SubCutaneous three times a day before meals  isosorbide   mononitrate ER Tablet (IMDUR) 30 milliGRAM(s) Oral daily  melatonin 3 milliGRAM(s) Oral at bedtime  pantoprazole  Injectable 40 milliGRAM(s) IV Push two times a day  simvastatin 40 milliGRAM(s) Oral at bedtime  sodium chloride 0.9%. 1000 milliLiter(s) (75 mL/Hr) IV Continuous <Continuous>    MEDICATIONS  (PRN):  acetaminophen     Tablet .. 650 milliGRAM(s) Oral every 6 hours PRN Temp greater or equal to 38C (100.4F), Mild Pain (1 - 3)  dextrose Oral Gel 15 Gram(s) Oral once PRN Blood Glucose LESS THAN 70 milliGRAM(s)/deciliter  guaiFENesin Oral Liquid (Sugar-Free) 100 milliGRAM(s) Oral every 6 hours PRN Cough         Vitals log        ICU Vital Signs Last 24 Hrs  T(C): 37.4 (06 Nov 2022 04:02), Max: 37.4 (06 Nov 2022 04:02)  T(F): 99.4 (06 Nov 2022 04:02), Max: 99.4 (06 Nov 2022 04:02)  HR: 64 (06 Nov 2022 04:02) (64 - 100)  BP: 143/80 (06 Nov 2022 04:02) (143/80 - 155/66)  BP(mean): --  ABP: --  ABP(mean): --  RR: 18 (06 Nov 2022 04:02) (16 - 18)  SpO2: 95% (06 Nov 2022 04:02) (94% - 100%)    O2 Parameters below as of 06 Nov 2022 04:02  Patient On (Oxygen Delivery Method): room air                 Input and Output:  I&O's Detail    05 Nov 2022 08:01  -  06 Nov 2022 05:51  --------------------------------------------------------  IN:    Oral Fluid: 360 mL  Total IN: 360 mL    OUT:    Voided (mL): 1400 mL  Total OUT: 1400 mL    Total NET: -1040 mL          Lab Data                        9.4    6.29  )-----------( 201      ( 05 Nov 2022 16:20 )             30.7     11-04    142  |  109<H>  |  33<H>  ----------------------------<  180<H>  4.7   |  25  |  1.50<H>    Ca    9.6      04 Nov 2022 16:05    TPro  7.4  /  Alb  3.5  /  TBili  0.4  /  DBili  x   /  AST  22  /  ALT  20  /  AlkPhos  73  11-04            Review of Systems	      Objective     Physical Examination    heart s1s2  lung dec BS  head nc      Pertinent Lab findings & Imaging      Alyssa:  NO   Adequate UO     I&O's Detail    05 Nov 2022 08:01  -  06 Nov 2022 05:51  --------------------------------------------------------  IN:    Oral Fluid: 360 mL  Total IN: 360 mL    OUT:    Voided (mL): 1400 mL  Total OUT: 1400 mL    Total NET: -1040 mL               Discussed with:     Cultures:	        Radiology

## 2022-11-06 NOTE — PROGRESS NOTE ADULT - SUBJECTIVE AND OBJECTIVE BOX
[facility]  Tavon Dorantes PA-C  [address] [place] [address]  [phone]      INTERVAL HPI/OVERNIGHT EVENTS:  Pt s/e  Janelle  ID #[id number]  She reports no further BM and thus no overt GI bleeding  Otherwise no GI complaints    MEDICATIONS  (STANDING):  ATENolol  Tablet 25 milliGRAM(s) Oral daily  benzonatate 100 milliGRAM(s) Oral three times a day  dextrose 5%. 1000 milliLiter(s) (100 mL/Hr) IV Continuous <Continuous>  dextrose 5%. 1000 milliLiter(s) (50 mL/Hr) IV Continuous <Continuous>  dextrose 50% Injectable 25 Gram(s) IV Push once  dextrose 50% Injectable 12.5 Gram(s) IV Push once  dextrose 50% Injectable 25 Gram(s) IV Push once  glucagon  Injectable 1 milliGRAM(s) IntraMuscular once  influenza  Vaccine (HIGH DOSE) 0.7 milliLiter(s) IntraMuscular once  insulin lispro (ADMELOG) corrective regimen sliding scale   SubCutaneous three times a day before meals  isosorbide   mononitrate ER Tablet (IMDUR) 30 milliGRAM(s) Oral daily  melatonin 3 milliGRAM(s) Oral at bedtime  pantoprazole  Injectable 40 milliGRAM(s) IV Push two times a day  simvastatin 40 milliGRAM(s) Oral at bedtime  sodium chloride 0.9%. 1000 milliLiter(s) (75 mL/Hr) IV Continuous <Continuous>    MEDICATIONS  (PRN):  acetaminophen     Tablet .. 650 milliGRAM(s) Oral every 6 hours PRN Temp greater or equal to 38C (100.4F), Mild Pain (1 - 3)  dextrose Oral Gel 15 Gram(s) Oral once PRN Blood Glucose LESS THAN 70 milliGRAM(s)/deciliter  guaiFENesin Oral Liquid (Sugar-Free) 100 milliGRAM(s) Oral every 6 hours PRN Cough      Allergies    No Known Allergies    PHYSICAL EXAM:   Vital Signs:  Vital Signs Last 24 Hrs  T(C): 37.4 (2022 04:02), Max: 37.4 (2022 04:02)  T(F): 99.4 (2022 04:02), Max: 99.4 (2022 04:02)  HR: 64 (2022 04:02) (64 - 100)  BP: 143/80 (2022 04:02) (143/80 - 155/66)  BP(mean): --  RR: 18 (2022 04:02) (16 - 18)  SpO2: 95% (2022 04:02) (94% - 100%)    Parameters below as of 2022 04:02  Patient On (Oxygen Delivery Method): room air      Daily     Daily Weight in k (2022 04:02)    GENERAL:  Appears stated age  ABDOMEN:  Soft, non-tender, non-distended  NEURO:  Alert      LABS:                        9.4    6.29  )-----------( 201      ( 2022 16:20 )             30.7     11-04    142  |  109<H>  |  33<H>  ----------------------------<  180<H>  4.7   |  25  |  1.50<H>    Ca    9.6      2022 16:05    TPro  7.4  /  Alb  3.5  /  TBili  0.4  /  DBili  x   /  AST  22  /  ALT  20  /  AlkPhos  73  11-04    PT/INR - ( 2022 16:05 )   PT: 13.9 sec;   INR: 1.19 ratio         PTT - ( 2022 16:05 )  PTT:28.3 sec  Urinalysis Basic - ( 2022 10:00 )    Color: Pale Yellow / Appearance: Clear / S.015 / pH: x  Gluc: x / Ketone: Negative  / Bili: Negative / Urobili: Negative   Blood: x / Protein: Negative / Nitrite: Negative   Leuk Esterase: Small / RBC: 0-2 /HPF / WBC 3-5   Sq Epi: x / Non Sq Epi: Occasional / Bacteria: x    I spent 120 minutes face to face with the patient. Time was spent in discussion with patient. Discussed rectal bleed, likely causes, treatment plan. Visit start time: 8:00. Visit end time: 10:00.

## 2022-11-06 NOTE — PROGRESS NOTE ADULT - ASSESSMENT
The patient is a 89 year old male with a history of HTN, HL, DM, CAD s/p PCI who is admitted with GI bleed and COVID.    Plan:  - Recent cath 8/22 with patent stents and otherwise distal small vessel disease  - Continue atenolol 25 mg daily (mildly bradycardic at times)  - Hold losartan due to HILLARY  - Continue imdur 30 mg daily  - Hold aspirin  - Monitor hemoglobin  - COVID positive  - ID and GI follow-up

## 2022-11-06 NOTE — PROGRESS NOTE ADULT - ASSESSMENT
89 y/o female w/ PMHx of diabetes, HTN, HLD, CAD (s/p stent) brought to the ED after having a bloody bowel movement x1. Found to have diverticulosis on CT, no evidence acute diverticulitis. Pt denies any recent bowel movements.

## 2022-11-06 NOTE — PROGRESS NOTE ADULT - ASSESSMENT
89 F hx dm , htn, hld, gastritis, cad, stents x 2, OA BIB daughter due to bloody bowel movement around 2 PM. Pt woke up with decreased PO intake, not feeling well, +chills. Complained of abdominal discomfort. Vomited. Took a nap. Woke up asking to go to bathroom. While walking with daughter she had bloody BM in diaper. On ASA. (04 Nov 2022 18:47)      ACUTE RENAL FAILURE: sodium chloride 0.9%. 1000 milliLiter(s) (75 mL/Hr) IV Continuous   Serum creatinine is  improved      , approximating GFR at   ml/min.   There is no progression . No uremic symptoms  pos  evidence of anemia .  Fluid status stable.  Will continue to avoid nephrotoxic drugs.  Patient remains asymptomatic.   Continue current therapy.      BP monitoring,continue current antihypertensive meds, low salt diet,followup with PMD in 1-2 weeks   ATENolol  Tablet 25 milliGRAM(s) Oral daily

## 2022-11-06 NOTE — PROGRESS NOTE ADULT - SUBJECTIVE AND OBJECTIVE BOX
Chief Complaint: GI bleed, COVID    Interval Events: No events overnight.    Review of Systems:  General: No fevers, chills, weight gain  Skin: No rashes, color changes  Cardiovascular: No chest pain, orthopnea  Respiratory: No shortness of breath, cough  Gastrointestinal: No nausea, abdominal pain  Genitourinary: No incontinence, pain with urination  Musculoskeletal: No pain, swelling, decreased range of motion  Neurological: No headache, weakness  Psychiatric: No depression, anxiety  Endocrine: No weight gain, increased thirst  All other systems are comprehensively negative.    Physical Exam:  Vitals:        Vital Signs Last 24 Hrs  T(C): 37.4 (06 Nov 2022 04:02), Max: 37.4 (06 Nov 2022 04:02)  T(F): 99.4 (06 Nov 2022 04:02), Max: 99.4 (06 Nov 2022 04:02)  HR: 64 (06 Nov 2022 04:02) (64 - 100)  BP: 143/80 (06 Nov 2022 04:02) (143/80 - 155/66)  BP(mean): --  RR: 18 (06 Nov 2022 04:02) (16 - 18)  SpO2: 95% (06 Nov 2022 04:02) (94% - 100%)  Parameters below as of 06 Nov 2022 04:02  Patient On (Oxygen Delivery Method): room air  General: NAD  HEENT: MMM  Neck: No JVD, no carotid bruit  Lungs: CTAB  CV: RRR, nl S1/S2, no M/R/G  Abdomen: S/NT/ND, +BS  Extremities: No LE edema, no cyanosis  Neuro: AAOx3, non-focal  Skin: No rash    Labs:                        9.4    6.29  )-----------( 201      ( 05 Nov 2022 16:20 )             30.7     11-04    142  |  109<H>  |  33<H>  ----------------------------<  180<H>  4.7   |  25  |  1.50<H>    Ca    9.6      04 Nov 2022 16:05    TPro  7.4  /  Alb  3.5  /  TBili  0.4  /  DBili  x   /  AST  22  /  ALT  20  /  AlkPhos  73  11-04        PT/INR - ( 04 Nov 2022 16:05 )   PT: 13.9 sec;   INR: 1.19 ratio         PTT - ( 04 Nov 2022 16:05 )  PTT:28.3 sec    ECG/Telemetry: Sinus rhythm

## 2022-11-07 LAB
ALBUMIN SERPL ELPH-MCNC: 2.6 G/DL — LOW (ref 3.3–5)
ALP SERPL-CCNC: 54 U/L — SIGNIFICANT CHANGE UP (ref 40–120)
ALT FLD-CCNC: 30 U/L — SIGNIFICANT CHANGE UP (ref 12–78)
ANION GAP SERPL CALC-SCNC: 4 MMOL/L — LOW (ref 5–17)
AST SERPL-CCNC: 78 U/L — HIGH (ref 15–37)
BILIRUB SERPL-MCNC: 0.3 MG/DL — SIGNIFICANT CHANGE UP (ref 0.2–1.2)
BUN SERPL-MCNC: 15 MG/DL — SIGNIFICANT CHANGE UP (ref 7–23)
CALCIUM SERPL-MCNC: 7.9 MG/DL — LOW (ref 8.5–10.1)
CHLORIDE SERPL-SCNC: 112 MMOL/L — HIGH (ref 96–108)
CO2 SERPL-SCNC: 28 MMOL/L — SIGNIFICANT CHANGE UP (ref 22–31)
CREAT SERPL-MCNC: 0.69 MG/DL — SIGNIFICANT CHANGE UP (ref 0.5–1.3)
EGFR: 82 ML/MIN/1.73M2 — SIGNIFICANT CHANGE UP
GLUCOSE SERPL-MCNC: 110 MG/DL — HIGH (ref 70–99)
HCT VFR BLD CALC: 26.1 % — LOW (ref 34.5–45)
HGB BLD-MCNC: 8.2 G/DL — LOW (ref 11.5–15.5)
MCHC RBC-ENTMCNC: 30.7 PG — SIGNIFICANT CHANGE UP (ref 27–34)
MCHC RBC-ENTMCNC: 31.4 GM/DL — LOW (ref 32–36)
MCV RBC AUTO: 97.8 FL — SIGNIFICANT CHANGE UP (ref 80–100)
NRBC # BLD: 0 /100 WBCS — SIGNIFICANT CHANGE UP (ref 0–0)
PLATELET # BLD AUTO: 179 K/UL — SIGNIFICANT CHANGE UP (ref 150–400)
POTASSIUM SERPL-MCNC: 3.7 MMOL/L — SIGNIFICANT CHANGE UP (ref 3.5–5.3)
POTASSIUM SERPL-SCNC: 3.7 MMOL/L — SIGNIFICANT CHANGE UP (ref 3.5–5.3)
PROT SERPL-MCNC: 5.7 G/DL — LOW (ref 6–8.3)
RBC # BLD: 2.67 M/UL — LOW (ref 3.8–5.2)
RBC # FLD: 12.6 % — SIGNIFICANT CHANGE UP (ref 10.3–14.5)
SODIUM SERPL-SCNC: 144 MMOL/L — SIGNIFICANT CHANGE UP (ref 135–145)
WBC # BLD: 5.08 K/UL — SIGNIFICANT CHANGE UP (ref 3.8–10.5)
WBC # FLD AUTO: 5.08 K/UL — SIGNIFICANT CHANGE UP (ref 3.8–10.5)

## 2022-11-07 PROCEDURE — 99231 SBSQ HOSP IP/OBS SF/LOW 25: CPT

## 2022-11-07 RX ORDER — PHENYLEPHRINE-SHARK LIVER OIL-MINERAL OIL-PETROLATUM RECTAL OINTMENT
1 OINTMENT (GRAM) RECTAL AT BEDTIME
Refills: 0 | Status: DISCONTINUED | OUTPATIENT
Start: 2022-11-07 | End: 2022-11-08

## 2022-11-07 RX ORDER — ALBUTEROL 90 UG/1
2 AEROSOL, METERED ORAL EVERY 6 HOURS
Refills: 0 | Status: DISCONTINUED | OUTPATIENT
Start: 2022-11-07 | End: 2022-11-08

## 2022-11-07 RX ADMIN — ISOSORBIDE MONONITRATE 30 MILLIGRAM(S): 60 TABLET, EXTENDED RELEASE ORAL at 11:58

## 2022-11-07 RX ADMIN — PANTOPRAZOLE SODIUM 40 MILLIGRAM(S): 20 TABLET, DELAYED RELEASE ORAL at 05:02

## 2022-11-07 RX ADMIN — SODIUM CHLORIDE 75 MILLILITER(S): 9 INJECTION INTRAMUSCULAR; INTRAVENOUS; SUBCUTANEOUS at 13:00

## 2022-11-07 RX ADMIN — Medication 3 MILLIGRAM(S): at 22:22

## 2022-11-07 RX ADMIN — Medication 100 MILLIGRAM(S): at 22:21

## 2022-11-07 RX ADMIN — PANTOPRAZOLE SODIUM 40 MILLIGRAM(S): 20 TABLET, DELAYED RELEASE ORAL at 16:46

## 2022-11-07 RX ADMIN — ATENOLOL 25 MILLIGRAM(S): 25 TABLET ORAL at 05:02

## 2022-11-07 RX ADMIN — PHENYLEPHRINE-SHARK LIVER OIL-MINERAL OIL-PETROLATUM RECTAL OINTMENT 1 APPLICATION(S): at 22:23

## 2022-11-07 RX ADMIN — Medication 100 MILLIGRAM(S): at 13:00

## 2022-11-07 RX ADMIN — SIMVASTATIN 40 MILLIGRAM(S): 20 TABLET, FILM COATED ORAL at 22:21

## 2022-11-07 RX ADMIN — Medication 1: at 17:16

## 2022-11-07 RX ADMIN — Medication 100 MILLIGRAM(S): at 05:02

## 2022-11-07 NOTE — PROGRESS NOTE ADULT - SUBJECTIVE AND OBJECTIVE BOX
[facility] [place] [facility] Progress Note [phone] (Dr. Maciel, Sathish, Morelia, Jeff)    CHIEF COMPLAINT: Patient is a 89y old  Female who presents with a chief complaint of bloody bowel movement (07 Nov 2022 05:44)      Follow Up Today: The patient denies any chest discomfort or shortness of breath.    HPI:  89 F hx dm , htn, hld, gastritis, cad, stents x 2, OA BIB daughter due to bloody bowel movement around 2 PM. Pt woke up with decreased PO intake, not feeling well, +chills. Complained of abdominal discomfort. Vomited. Took a nap. Woke up asking to go to bathroom. While walking with daughter she had bloody BM in diaper. On ASA. (04 Nov 2022 18:47)      PAST MEDICAL & SURGICAL HISTORY:  Diabetes mellitus      CAD (coronary artery disease)  2 cardiac stents placed      Hypertension      Hyperlipidemia      HTN (hypertension)      DM (diabetes mellitus)  diet-controlled      CAD (coronary artery disease)      History of cholelithiasis      Hypercholesteremia      Bilateral cataracts      S/P coronary artery stent placement          MEDICATIONS  (STANDING):  ATENolol  Tablet 25 milliGRAM(s) Oral daily  benzonatate 100 milliGRAM(s) Oral three times a day  dextrose 5%. 1000 milliLiter(s) (100 mL/Hr) IV Continuous <Continuous>  dextrose 5%. 1000 milliLiter(s) (50 mL/Hr) IV Continuous <Continuous>  dextrose 50% Injectable 25 Gram(s) IV Push once  dextrose 50% Injectable 12.5 Gram(s) IV Push once  dextrose 50% Injectable 25 Gram(s) IV Push once  glucagon  Injectable 1 milliGRAM(s) IntraMuscular once  influenza  Vaccine (HIGH DOSE) 0.7 milliLiter(s) IntraMuscular once  insulin lispro (ADMELOG) corrective regimen sliding scale   SubCutaneous three times a day before meals  isosorbide   mononitrate ER Tablet (IMDUR) 30 milliGRAM(s) Oral daily  melatonin 3 milliGRAM(s) Oral at bedtime  pantoprazole  Injectable 40 milliGRAM(s) IV Push two times a day  simvastatin 40 milliGRAM(s) Oral at bedtime  sodium chloride 0.9%. 1000 milliLiter(s) (75 mL/Hr) IV Continuous <Continuous>    MEDICATIONS  (PRN):  acetaminophen     Tablet .. 650 milliGRAM(s) Oral every 6 hours PRN Temp greater or equal to 38C (100.4F), Mild Pain (1 - 3)  dextrose Oral Gel 15 Gram(s) Oral once PRN Blood Glucose LESS THAN 70 milliGRAM(s)/deciliter  guaiFENesin Oral Liquid (Sugar-Free) 100 milliGRAM(s) Oral every 6 hours PRN Cough      Allergies    No Known Allergies    Intolerances        REVIEW OF SYSTEMS:    All other review of systems is negative unless indicated above    Vital Signs Last 24 Hrs  T(C): 36.9 (07 Nov 2022 04:11), Max: 37.2 (06 Nov 2022 11:22)  T(F): 98.5 (07 Nov 2022 04:11), Max: 98.9 (06 Nov 2022 11:22)  HR: 69 (07 Nov 2022 04:11) (52 - 69)  BP: 163/69 (07 Nov 2022 04:11) (117/52 - 166/64)  BP(mean): --  RR: 18 (07 Nov 2022 04:11) (16 - 18)  SpO2: 92% (07 Nov 2022 04:11) (92% - 95%)    Parameters below as of 07 Nov 2022 04:11  Patient On (Oxygen Delivery Method): room air        I&O's Summary    05 Nov 2022 08:01  -  06 Nov 2022 07:00  --------------------------------------------------------  IN: 1185 mL / OUT: 1400 mL / NET: -215 mL    06 Nov 2022 07:01  -  07 Nov 2022 06:21  --------------------------------------------------------  IN: 360 mL / OUT: 0 mL / NET: 360 mL        PHYSICAL EXAM:    Constitutional: NAD, awake and alert, well-developed  Eyes:  EOMI,  Pupils round, No oral cyanosis.  HEENT: No exudate or erythema  Pulmonary: Non-labored, breath sounds are clear bilaterally, No wheezing, rales or rhonchi  Cardiovascular: Regular, S1 and S2, No murmurs, rubs, gallops oir clicks  Gastrointestinal: Bowel Sounds present, soft, nontender.   Ext: No significant LE edema  Neurological: Alert, no gross focal motor deficits  Skin: No rashes.  Psych:  Mood & affect appropriate    LABS: All Labs Reviewed:                        8.3    5.42  )-----------( 173      ( 06 Nov 2022 11:38 )             26.4                         9.4    6.29  )-----------( 201      ( 05 Nov 2022 16:20 )             30.7                         9.1    6.29  )-----------( 186      ( 05 Nov 2022 11:08 )             29.0     06 Nov 2022 11:38    143    |  112    |  16     ----------------------------<  151    4.0     |  27     |  0.82   04 Nov 2022 16:05    142    |  109    |  33     ----------------------------<  180    4.7     |  25     |  1.50     Ca    8.0        06 Nov 2022 11:38  Ca    9.6        04 Nov 2022 16:05    TPro  5.9    /  Alb  2.7    /  TBili  0.3    /  DBili  x      /  AST  89     /  ALT  29     /  AlkPhos  50     06 Nov 2022 11:38  TPro  7.4    /  Alb  3.5    /  TBili  0.4    /  DBili  x      /  AST  22     /  ALT  20     /  AlkPhos  73     04 Nov 2022 16:05          Blood Culture: Organism --  Gram Stain Blood -- Gram Stain --  Specimen Source Clean Catch Clean Catch (Midstream)  Culture-Blood --    Organism --  Gram Stain Blood -- Gram Stain --  Specimen Source .Blood Blood-Peripheral  Culture-Blood --    Organism --  Gram Stain Blood -- Gram Stain --  Specimen Source .Blood Blood-Peripheral  Culture-Blood --            RADIOLOGY/EKG:    Attending Attestation:   20 minutes spent on total encounter; more than 50% of the visit was spent counseling and/or coordinating care by the attending physician.     ASSESSMENT AND PLAN [facility] Progress Note [phone] (Dr. Maciel, Sathish, Morelia, Jeff)    CHIEF COMPLAINT: Patient is a 89y old  Female who presents with a chief complaint of bloody bowel movement (07 Nov 2022 05:44)      Follow Up Today: The patient is still with bad cough.  Does not speak English    HPI:  89 F hx dm , htn, hld, gastritis, cad, stents x 2, OA BIB daughter due to bloody bowel movement around 2 PM. Pt woke up with decreased PO intake, not feeling well, +chills. Complained of abdominal discomfort. Vomited. Took a nap. Woke up asking to go to bathroom. While walking with daughter she had bloody BM in diaper. On ASA. (04 Nov 2022 18:47)      PAST MEDICAL & SURGICAL HISTORY:  Diabetes mellitus      CAD (coronary artery disease)  2 cardiac stents placed      Hypertension      Hyperlipidemia      HTN (hypertension)      DM (diabetes mellitus)  diet-controlled      CAD (coronary artery disease)      History of cholelithiasis      Hypercholesteremia      Bilateral cataracts      S/P coronary artery stent placement          MEDICATIONS  (STANDING):  ATENolol  Tablet 25 milliGRAM(s) Oral daily  benzonatate 100 milliGRAM(s) Oral three times a day  dextrose 5%. 1000 milliLiter(s) (100 mL/Hr) IV Continuous <Continuous>  dextrose 5%. 1000 milliLiter(s) (50 mL/Hr) IV Continuous <Continuous>  dextrose 50% Injectable 25 Gram(s) IV Push once  dextrose 50% Injectable 12.5 Gram(s) IV Push once  dextrose 50% Injectable 25 Gram(s) IV Push once  glucagon  Injectable 1 milliGRAM(s) IntraMuscular once  influenza  Vaccine (HIGH DOSE) 0.7 milliLiter(s) IntraMuscular once  insulin lispro (ADMELOG) corrective regimen sliding scale   SubCutaneous three times a day before meals  isosorbide   mononitrate ER Tablet (IMDUR) 30 milliGRAM(s) Oral daily  melatonin 3 milliGRAM(s) Oral at bedtime  pantoprazole  Injectable 40 milliGRAM(s) IV Push two times a day  simvastatin 40 milliGRAM(s) Oral at bedtime  sodium chloride 0.9%. 1000 milliLiter(s) (75 mL/Hr) IV Continuous <Continuous>    MEDICATIONS  (PRN):  acetaminophen     Tablet .. 650 milliGRAM(s) Oral every 6 hours PRN Temp greater or equal to 38C (100.4F), Mild Pain (1 - 3)  dextrose Oral Gel 15 Gram(s) Oral once PRN Blood Glucose LESS THAN 70 milliGRAM(s)/deciliter  guaiFENesin Oral Liquid (Sugar-Free) 100 milliGRAM(s) Oral every 6 hours PRN Cough      Allergies    No Known Allergies    Intolerances        REVIEW OF SYSTEMS:    All other review of systems is negative unless indicated above    Vital Signs Last 24 Hrs  T(C): 36.9 (07 Nov 2022 04:11), Max: 37.2 (06 Nov 2022 11:22)  T(F): 98.5 (07 Nov 2022 04:11), Max: 98.9 (06 Nov 2022 11:22)  HR: 69 (07 Nov 2022 04:11) (52 - 69)  BP: 163/69 (07 Nov 2022 04:11) (117/52 - 166/64)  BP(mean): --  RR: 18 (07 Nov 2022 04:11) (16 - 18)  SpO2: 92% (07 Nov 2022 04:11) (92% - 95%)    Parameters below as of 07 Nov 2022 04:11  Patient On (Oxygen Delivery Method): room air        I&O's Summary    05 Nov 2022 08:01  -  06 Nov 2022 07:00  --------------------------------------------------------  IN: 1185 mL / OUT: 1400 mL / NET: -215 mL    06 Nov 2022 07:01  -  07 Nov 2022 06:21  --------------------------------------------------------  IN: 360 mL / OUT: 0 mL / NET: 360 mL        PHYSICAL EXAM:    Constitutional: NAD, awake and alert, well-developed  Eyes:  EOMI,  Pupils round, No oral cyanosis.  HEENT: No exudate or erythema  Pulmonary: Non-labored, breath sounds are clear bilaterally, No wheezing, rales or rhonchi  Cardiovascular: Regular, S1 and S2, No murmurs, rubs, gallops oir clicks  Gastrointestinal: Bowel Sounds present, soft, nontender.   Ext: No significant LE edema  Neurological: Alert, no gross focal motor deficits  Skin: No rashes.  Psych:  Mood & affect appropriate    LABS: All Labs Reviewed:                        8.3    5.42  )-----------( 173      ( 06 Nov 2022 11:38 )             26.4                         9.4    6.29  )-----------( 201      ( 05 Nov 2022 16:20 )             30.7                         9.1    6.29  )-----------( 186      ( 05 Nov 2022 11:08 )             29.0     06 Nov 2022 11:38    143    |  112    |  16     ----------------------------<  151    4.0     |  27     |  0.82   04 Nov 2022 16:05    142    |  109    |  33     ----------------------------<  180    4.7     |  25     |  1.50     Ca    8.0        06 Nov 2022 11:38  Ca    9.6        04 Nov 2022 16:05    TPro  5.9    /  Alb  2.7    /  TBili  0.3    /  DBili  x      /  AST  89     /  ALT  29     /  AlkPhos  50     06 Nov 2022 11:38  TPro  7.4    /  Alb  3.5    /  TBili  0.4    /  DBili  x      /  AST  22     /  ALT  20     /  AlkPhos  73     04 Nov 2022 16:05          Blood Culture: Organism --  Gram Stain Blood -- Gram Stain --  Specimen Source Clean Catch Clean Catch (Midstream)  Culture-Blood --    Organism --  Gram Stain Blood -- Gram Stain --  Specimen Source .Blood Blood-Peripheral  Culture-Blood --    Organism --  Gram Stain Blood -- Gram Stain --  Specimen Source .Blood Blood-Peripheral  Culture-Blood --            RADIOLOGY/EKG:    Attending Attestation:   20 minutes spent on total encounter; more than 50% of the visit was spent counseling and/or coordinating care by the attending physician.     ASSESSMENT AND PLAN

## 2022-11-07 NOTE — PROGRESS NOTE ADULT - SUBJECTIVE AND OBJECTIVE BOX
Patient is a 89y old  Female who presents with a chief complaint of bloody bowel movement (07 Nov 2022 11:27)    Date of servie : 11-07-22 @ 13:25  INTERVAL HPI/OVERNIGHT EVENTS:  T(C): 36.5 (11-07-22 @ 12:00), Max: 36.9 (11-07-22 @ 04:11)  HR: 60 (11-07-22 @ 12:00) (58 - 69)  BP: 148/67 (11-07-22 @ 12:00) (148/67 - 166/64)  RR: 18 (11-07-22 @ 12:00) (18 - 18)  SpO2: 94% (11-07-22 @ 12:00) (92% - 95%)  Wt(kg): --  I&O's Summary    06 Nov 2022 07:01  -  07 Nov 2022 07:00  --------------------------------------------------------  IN: 1110 mL / OUT: 0 mL / NET: 1110 mL        LABS:                        8.2    5.08  )-----------( 179      ( 07 Nov 2022 06:18 )             26.1     11-07    144  |  112<H>  |  15  ----------------------------<  110<H>  3.7   |  28  |  0.69    Ca    7.9<L>      07 Nov 2022 06:18    TPro  5.7<L>  /  Alb  2.6<L>  /  TBili  0.3  /  DBili  x   /  AST  78<H>  /  ALT  30  /  AlkPhos  54  11-07        CAPILLARY BLOOD GLUCOSE      POCT Blood Glucose.: 125 mg/dL (07 Nov 2022 12:42)  POCT Blood Glucose.: 108 mg/dL (07 Nov 2022 08:17)  POCT Blood Glucose.: 142 mg/dL (06 Nov 2022 21:21)  POCT Blood Glucose.: 160 mg/dL (06 Nov 2022 16:52)            MEDICATIONS  (STANDING):  ATENolol  Tablet 25 milliGRAM(s) Oral daily  benzonatate 100 milliGRAM(s) Oral three times a day  dextrose 5%. 1000 milliLiter(s) (100 mL/Hr) IV Continuous <Continuous>  dextrose 5%. 1000 milliLiter(s) (50 mL/Hr) IV Continuous <Continuous>  dextrose 50% Injectable 25 Gram(s) IV Push once  dextrose 50% Injectable 12.5 Gram(s) IV Push once  dextrose 50% Injectable 25 Gram(s) IV Push once  glucagon  Injectable 1 milliGRAM(s) IntraMuscular once  hemorrhoidal Ointment 1 Application(s) Rectal at bedtime  influenza  Vaccine (HIGH DOSE) 0.7 milliLiter(s) IntraMuscular once  insulin lispro (ADMELOG) corrective regimen sliding scale   SubCutaneous three times a day before meals  isosorbide   mononitrate ER Tablet (IMDUR) 30 milliGRAM(s) Oral daily  melatonin 3 milliGRAM(s) Oral at bedtime  pantoprazole  Injectable 40 milliGRAM(s) IV Push two times a day  simvastatin 40 milliGRAM(s) Oral at bedtime  sodium chloride 0.9%. 1000 milliLiter(s) (75 mL/Hr) IV Continuous <Continuous>    MEDICATIONS  (PRN):  acetaminophen     Tablet .. 650 milliGRAM(s) Oral every 6 hours PRN Temp greater or equal to 38C (100.4F), Mild Pain (1 - 3)  dextrose Oral Gel 15 Gram(s) Oral once PRN Blood Glucose LESS THAN 70 milliGRAM(s)/deciliter  guaiFENesin Oral Liquid (Sugar-Free) 100 milliGRAM(s) Oral every 6 hours PRN Cough  hydrocodone/homatropine Syrup 5 milliLiter(s) Oral every 6 hours PRN Cough          PHYSICAL EXAM:  GENERAL: NAD, well-groomed, well-developed  HEAD:  Atraumatic, Normocephalic  CHEST/LUNG: Clear to percussion bilaterally; No rales, rhonchi, wheezing, or rubs  HEART: Regular rate and rhythm; No murmurs, rubs, or gallops  ABDOMEN: Soft, Nontender, Nondistended; Bowel sounds present  EXTREMITIES:  2+ Peripheral Pulses, No clubbing, cyanosis, or edema  LYMPH: No lymphadenopathy noted  SKIN: No rashes or lesions    Care Discussed with Consultants/Other Providers [ ] YES  [ ] NO

## 2022-11-07 NOTE — PROGRESS NOTE ADULT - ASSESSMENT
89 F hx dm , htn, hld, gastritis, cad, stents x 2, OA BIB daughter due to bloody bowel movement    diverticular disease  eval GI Bleed  HTN  HLD  CAD  Dementia  COVID  Lung Granuloma  OP  OA  DM    GI - Surgery eval noted - Cardio eval noted -   poor candidate for colonoscopy as per GI note  on IVF    GI eval  serial Hgb  monitor VS and HD  DM care and serial FS  assist with needs - monitor mental status - GOC discussion - oral hygiene - skin care  isolation for COVID  monitor O2 sat - no indication for Decadron at present  dvt p - seq teds  cvs rx regimen and BP control  ACAP - Robitussin PRN for sx management

## 2022-11-07 NOTE — PROGRESS NOTE ADULT - SUBJECTIVE AND OBJECTIVE BOX
pt seen  no 24 events  ICU Vital Signs Last 24 Hrs  T(C): 36.9 (07 Nov 2022 04:11), Max: 37.2 (06 Nov 2022 11:22)  T(F): 98.5 (07 Nov 2022 04:11), Max: 98.9 (06 Nov 2022 11:22)  HR: 69 (07 Nov 2022 04:11) (52 - 69)  BP: 163/69 (07 Nov 2022 04:11) (117/52 - 166/64)  BP(mean): --  ABP: --  ABP(mean): --  RR: 18 (07 Nov 2022 04:11) (16 - 18)  SpO2: 92% (07 Nov 2022 04:11) (92% - 95%)    O2 Parameters below as of 07 Nov 2022 04:11  Patient On (Oxygen Delivery Method): room air        gen-NAD  resp-clear  abd-soft NT/ND                          8.2    5.08  )-----------( 179      ( 07 Nov 2022 06:18 )             26.1

## 2022-11-07 NOTE — PROGRESS NOTE ADULT - SUBJECTIVE AND OBJECTIVE BOX
[facility]  Tavon Dorantes PA-C  [address] [place] [address]  [phone]      INTERVAL HPI/OVERNIGHT EVENTS:  pt s/e  Janelle  ID #[id number]  She reports hemorrhoids hurt when she defecates; intermittent streaks of blood in stool  Otherwise no new GI complaints    MEDICATIONS  (STANDING):  ATENolol  Tablet 25 milliGRAM(s) Oral daily  benzonatate 100 milliGRAM(s) Oral three times a day  dextrose 5%. 1000 milliLiter(s) (100 mL/Hr) IV Continuous <Continuous>  dextrose 5%. 1000 milliLiter(s) (50 mL/Hr) IV Continuous <Continuous>  dextrose 50% Injectable 25 Gram(s) IV Push once  dextrose 50% Injectable 12.5 Gram(s) IV Push once  dextrose 50% Injectable 25 Gram(s) IV Push once  glucagon  Injectable 1 milliGRAM(s) IntraMuscular once  hemorrhoidal Ointment 1 Application(s) Rectal at bedtime  influenza  Vaccine (HIGH DOSE) 0.7 milliLiter(s) IntraMuscular once  insulin lispro (ADMELOG) corrective regimen sliding scale   SubCutaneous three times a day before meals  isosorbide   mononitrate ER Tablet (IMDUR) 30 milliGRAM(s) Oral daily  melatonin 3 milliGRAM(s) Oral at bedtime  pantoprazole  Injectable 40 milliGRAM(s) IV Push two times a day  simvastatin 40 milliGRAM(s) Oral at bedtime  sodium chloride 0.9%. 1000 milliLiter(s) (75 mL/Hr) IV Continuous <Continuous>    MEDICATIONS  (PRN):  acetaminophen     Tablet .. 650 milliGRAM(s) Oral every 6 hours PRN Temp greater or equal to 38C (100.4F), Mild Pain (1 - 3)  dextrose Oral Gel 15 Gram(s) Oral once PRN Blood Glucose LESS THAN 70 milliGRAM(s)/deciliter  guaiFENesin Oral Liquid (Sugar-Free) 100 milliGRAM(s) Oral every 6 hours PRN Cough  hydrocodone/homatropine Syrup 5 milliLiter(s) Oral every 6 hours PRN Cough      Allergies    No Known Allergies      PHYSICAL EXAM:   Vital Signs:  Vital Signs Last 24 Hrs  T(C): 36.9 (:), Max: 36.9 (:)  T(F): 98.5 (), Max: 98.5 ()  HR: 69 (:) (58 - 69)  BP: 163/69 (:) (163/69 - 166/64)  BP(mean): --  RR: 18 (:) (18 - 18)  SpO2: 92% () (92% - 95%)    Parameters below as of   Patient On (Oxygen Delivery Method): room air      Daily     Daily Weight in k (:)    GENERAL:  Appears stated age  ABDOMEN:  Soft, non-tender, non-distended  NEURO:  Alert    LABS:                        8.2    5.08  )-----------( 179      ( :18 )             26.1         144  |  112<H>  |  15  ----------------------------<  110<H>  3.7   |  28  |  0.69    Ca    7.9<L>          TPro  5.7<L>  /  Alb  2.6<L>  /  TBili  0.3  /  DBili  x   /  AST  78<H>  /  ALT  30  /  AlkPhos  54

## 2022-11-07 NOTE — PROGRESS NOTE ADULT - SUBJECTIVE AND OBJECTIVE BOX
Date/Time Patient Seen:  		  Referring MD:   Data Reviewed	       Patient is a 89y old  Female who presents with a chief complaint of bloody bowel movement (06 Nov 2022 17:05)      Subjective/HPI     PAST MEDICAL & SURGICAL HISTORY:  Diabetes mellitus    CAD (coronary artery disease)  2 cardiac stents placed    Hypertension    Hyperlipidemia    No pertinent past medical history    No pertinent past medical history    HTN (hypertension)    DM (diabetes mellitus)  diet-controlled    CAD (coronary artery disease)    History of cholelithiasis    Hypercholesteremia    No significant past surgical history    Bilateral cataracts    No significant past surgical history    S/P coronary artery stent placement          Medication list         MEDICATIONS  (STANDING):  ATENolol  Tablet 25 milliGRAM(s) Oral daily  benzonatate 100 milliGRAM(s) Oral three times a day  dextrose 5%. 1000 milliLiter(s) (100 mL/Hr) IV Continuous <Continuous>  dextrose 5%. 1000 milliLiter(s) (50 mL/Hr) IV Continuous <Continuous>  dextrose 50% Injectable 25 Gram(s) IV Push once  dextrose 50% Injectable 12.5 Gram(s) IV Push once  dextrose 50% Injectable 25 Gram(s) IV Push once  glucagon  Injectable 1 milliGRAM(s) IntraMuscular once  influenza  Vaccine (HIGH DOSE) 0.7 milliLiter(s) IntraMuscular once  insulin lispro (ADMELOG) corrective regimen sliding scale   SubCutaneous three times a day before meals  isosorbide   mononitrate ER Tablet (IMDUR) 30 milliGRAM(s) Oral daily  melatonin 3 milliGRAM(s) Oral at bedtime  pantoprazole  Injectable 40 milliGRAM(s) IV Push two times a day  simvastatin 40 milliGRAM(s) Oral at bedtime  sodium chloride 0.9%. 1000 milliLiter(s) (75 mL/Hr) IV Continuous <Continuous>    MEDICATIONS  (PRN):  acetaminophen     Tablet .. 650 milliGRAM(s) Oral every 6 hours PRN Temp greater or equal to 38C (100.4F), Mild Pain (1 - 3)  dextrose Oral Gel 15 Gram(s) Oral once PRN Blood Glucose LESS THAN 70 milliGRAM(s)/deciliter  guaiFENesin Oral Liquid (Sugar-Free) 100 milliGRAM(s) Oral every 6 hours PRN Cough         Vitals log        ICU Vital Signs Last 24 Hrs  T(C): 36.9 (07 Nov 2022 04:11), Max: 37.2 (06 Nov 2022 11:22)  T(F): 98.5 (07 Nov 2022 04:11), Max: 98.9 (06 Nov 2022 11:22)  HR: 69 (07 Nov 2022 04:11) (52 - 69)  BP: 163/69 (07 Nov 2022 04:11) (117/52 - 166/64)  BP(mean): --  ABP: --  ABP(mean): --  RR: 18 (07 Nov 2022 04:11) (16 - 18)  SpO2: 92% (07 Nov 2022 04:11) (92% - 95%)    O2 Parameters below as of 07 Nov 2022 04:11  Patient On (Oxygen Delivery Method): room air                 Input and Output:  I&O's Detail    05 Nov 2022 08:01  -  06 Nov 2022 07:00  --------------------------------------------------------  IN:    Oral Fluid: 360 mL    sodium chloride 0.9%: 825 mL  Total IN: 1185 mL    OUT:    Voided (mL): 1400 mL  Total OUT: 1400 mL    Total NET: -215 mL      06 Nov 2022 07:01  -  07 Nov 2022 05:44  --------------------------------------------------------  IN:    Oral Fluid: 360 mL  Total IN: 360 mL    OUT:  Total OUT: 0 mL    Total NET: 360 mL          Lab Data                        8.3    5.42  )-----------( 173      ( 06 Nov 2022 11:38 )             26.4     11-06    143  |  112<H>  |  16  ----------------------------<  151<H>  4.0   |  27  |  0.82    Ca    8.0<L>      06 Nov 2022 11:38    TPro  5.9<L>  /  Alb  2.7<L>  /  TBili  0.3  /  DBili  x   /  AST  89<H>  /  ALT  29  /  AlkPhos  50  11-06            Review of Systems	      Objective     Physical Examination    heart s1s2  lung dc BS  head nc      Pertinent Lab findings & Imaging      Alyssa:  NO   Adequate UO     I&O's Detail    05 Nov 2022 08:01  -  06 Nov 2022 07:00  --------------------------------------------------------  IN:    Oral Fluid: 360 mL    sodium chloride 0.9%: 825 mL  Total IN: 1185 mL    OUT:    Voided (mL): 1400 mL  Total OUT: 1400 mL    Total NET: -215 mL      06 Nov 2022 07:01  -  07 Nov 2022 05:44  --------------------------------------------------------  IN:    Oral Fluid: 360 mL  Total IN: 360 mL    OUT:  Total OUT: 0 mL    Total NET: 360 mL               Discussed with:     Cultures:	        Radiology

## 2022-11-07 NOTE — PROGRESS NOTE ADULT - ASSESSMENT
The patient is a 89 year old male with a history of HTN, HL, DM, CAD s/p PCI who is admitted with GI bleed and COVID.    Plan:  - Recent cath 8/22 with patent stents and otherwise distal small vessel disease  - Continue atenolol 25 mg daily (mildly bradycardic at times)  - Hold losartan due to HILLARY  - Continue imdur 30 mg daily  - Hold aspirin  - Monitor hemoglobin  - COVID positive  - ID and GI follow-up 89F with h/o HTN, HL, DM, CAD s/p PCI who is admitted with GI bleed and COVID.    Plan:  - Recent cath 8/22 with patent stents and otherwise distal small vessel disease  - Continue atenolol 25 mg daily (mildly bradycardic at times)  - Hold losartan due to HILLARY  - Continue imdur 30 mg daily  - Hold aspirin  - Monitor hemoglobin  - COVID positive  - ID and GI follow-up

## 2022-11-07 NOTE — PROGRESS NOTE ADULT - SUBJECTIVE AND OBJECTIVE BOX
Patient is a 89y Female whom presented to the hospital with ckd and puneet     PAST MEDICAL & SURGICAL HISTORY:  Diabetes mellitus      CAD (coronary artery disease)  2 cardiac stents placed      Hypertension      Hyperlipidemia      HTN (hypertension)      DM (diabetes mellitus)  diet-controlled      CAD (coronary artery disease)      History of cholelithiasis      Hypercholesteremia      Bilateral cataracts      S/P coronary artery stent placement          MEDICATIONS  (STANDING):  ATENolol  Tablet 25 milliGRAM(s) Oral daily  benzonatate 100 milliGRAM(s) Oral three times a day  dextrose 5%. 1000 milliLiter(s) (100 mL/Hr) IV Continuous <Continuous>  dextrose 5%. 1000 milliLiter(s) (50 mL/Hr) IV Continuous <Continuous>  dextrose 50% Injectable 25 Gram(s) IV Push once  dextrose 50% Injectable 12.5 Gram(s) IV Push once  dextrose 50% Injectable 25 Gram(s) IV Push once  glucagon  Injectable 1 milliGRAM(s) IntraMuscular once  influenza  Vaccine (HIGH DOSE) 0.7 milliLiter(s) IntraMuscular once  insulin lispro (ADMELOG) corrective regimen sliding scale   SubCutaneous three times a day before meals  isosorbide   mononitrate ER Tablet (IMDUR) 30 milliGRAM(s) Oral daily  melatonin 3 milliGRAM(s) Oral at bedtime  pantoprazole  Injectable 40 milliGRAM(s) IV Push two times a day  simvastatin 40 milliGRAM(s) Oral at bedtime  sodium chloride 0.9%. 1000 milliLiter(s) (75 mL/Hr) IV Continuous <Continuous>      Allergies    No Known Allergies    Intolerances        SOCIAL HISTORY:  Denies ETOh,Smoking,     FAMILY HISTORY:  FH: MI (myocardial infarction) (Child)        REVIEW OF SYSTEMS:    CONSTITUTIONAL: No weakness, fevers or chills  RESPIRATORY: No cough, wheezing, hemoptysis; No shortness of breath  CARDIOVASCULAR: No chest pain or palpitations  GASTROINTESTINAL: No abdominal or epigastric pain. No nausea, vomiting,     No diarrhea or constipation. No melena   SKIN: dry                                               8.2    5.08  )-----------( 179      ( 07 Nov 2022 06:18 )             26.1       CBC Full  -  ( 07 Nov 2022 06:18 )  WBC Count : 5.08 K/uL  RBC Count : 2.67 M/uL  Hemoglobin : 8.2 g/dL  Hematocrit : 26.1 %  Platelet Count - Automated : 179 K/uL  Mean Cell Volume : 97.8 fl  Mean Cell Hemoglobin : 30.7 pg  Mean Cell Hemoglobin Concentration : 31.4 gm/dL  Auto Neutrophil # : x  Auto Lymphocyte # : x  Auto Monocyte # : x  Auto Eosinophil # : x  Auto Basophil # : x  Auto Neutrophil % : x  Auto Lymphocyte % : x  Auto Monocyte % : x  Auto Eosinophil % : x  Auto Basophil % : x      11-07    144  |  112<H>  |  15  ----------------------------<  110<H>  3.7   |  28  |  0.69    Ca    7.9<L>      07 Nov 2022 06:18    TPro  5.7<L>  /  Alb  2.6<L>  /  TBili  0.3  /  DBili  x   /  AST  78<H>  /  ALT  30  /  AlkPhos  54  11-07      CAPILLARY BLOOD GLUCOSE      POCT Blood Glucose.: 193 mg/dL (07 Nov 2022 16:57)  POCT Blood Glucose.: 125 mg/dL (07 Nov 2022 12:42)  POCT Blood Glucose.: 108 mg/dL (07 Nov 2022 08:17)  POCT Blood Glucose.: 142 mg/dL (06 Nov 2022 21:21)      Vital Signs Last 24 Hrs  T(C): 36.5 (07 Nov 2022 12:00), Max: 36.9 (07 Nov 2022 04:11)  T(F): 97.7 (07 Nov 2022 12:00), Max: 98.5 (07 Nov 2022 04:11)  HR: 60 (07 Nov 2022 12:00) (58 - 69)  BP: 148/67 (07 Nov 2022 12:00) (148/67 - 166/64)  BP(mean): --  RR: 18 (07 Nov 2022 12:00) (18 - 18)  SpO2: 94% (07 Nov 2022 12:00) (92% - 95%)    Parameters below as of 07 Nov 2022 12:00  Patient On (Oxygen Delivery Method): room air                PHYSICAL EXAM:    Constitutional: NAD  Respiratory: CTAB  Cardiovascular: S1 and S2  Gastrointestinal: BS+, soft, NT/ND  Extremities: No peripheral edema  Skin: dry   Access: Not applicable

## 2022-11-08 ENCOUNTER — TRANSCRIPTION ENCOUNTER (OUTPATIENT)
Age: 87
End: 2022-11-08

## 2022-11-08 VITALS
RESPIRATION RATE: 17 BRPM | TEMPERATURE: 98 F | OXYGEN SATURATION: 92 % | HEART RATE: 51 BPM | SYSTOLIC BLOOD PRESSURE: 155 MMHG | DIASTOLIC BLOOD PRESSURE: 70 MMHG

## 2022-11-08 LAB
ALBUMIN SERPL ELPH-MCNC: 2.8 G/DL — LOW (ref 3.3–5)
ALP SERPL-CCNC: 57 U/L — SIGNIFICANT CHANGE UP (ref 40–120)
ALT FLD-CCNC: 29 U/L — SIGNIFICANT CHANGE UP (ref 12–78)
ANION GAP SERPL CALC-SCNC: 3 MMOL/L — LOW (ref 5–17)
AST SERPL-CCNC: 58 U/L — HIGH (ref 15–37)
BILIRUB SERPL-MCNC: 0.6 MG/DL — SIGNIFICANT CHANGE UP (ref 0.2–1.2)
BUN SERPL-MCNC: 13 MG/DL — SIGNIFICANT CHANGE UP (ref 7–23)
CALCIUM SERPL-MCNC: 8.4 MG/DL — LOW (ref 8.5–10.1)
CHLORIDE SERPL-SCNC: 110 MMOL/L — HIGH (ref 96–108)
CO2 SERPL-SCNC: 31 MMOL/L — SIGNIFICANT CHANGE UP (ref 22–31)
CREAT SERPL-MCNC: 0.75 MG/DL — SIGNIFICANT CHANGE UP (ref 0.5–1.3)
EGFR: 76 ML/MIN/1.73M2 — SIGNIFICANT CHANGE UP
GLUCOSE SERPL-MCNC: 114 MG/DL — HIGH (ref 70–99)
HCT VFR BLD CALC: 26 % — LOW (ref 34.5–45)
HGB BLD-MCNC: 8.2 G/DL — LOW (ref 11.5–15.5)
MCHC RBC-ENTMCNC: 30.6 PG — SIGNIFICANT CHANGE UP (ref 27–34)
MCHC RBC-ENTMCNC: 31.5 GM/DL — LOW (ref 32–36)
MCV RBC AUTO: 97 FL — SIGNIFICANT CHANGE UP (ref 80–100)
NRBC # BLD: 0 /100 WBCS — SIGNIFICANT CHANGE UP (ref 0–0)
PLATELET # BLD AUTO: 201 K/UL — SIGNIFICANT CHANGE UP (ref 150–400)
POTASSIUM SERPL-MCNC: 4.4 MMOL/L — SIGNIFICANT CHANGE UP (ref 3.5–5.3)
POTASSIUM SERPL-SCNC: 4.4 MMOL/L — SIGNIFICANT CHANGE UP (ref 3.5–5.3)
PROT SERPL-MCNC: 6.3 G/DL — SIGNIFICANT CHANGE UP (ref 6–8.3)
RBC # BLD: 2.68 M/UL — LOW (ref 3.8–5.2)
RBC # FLD: 12.5 % — SIGNIFICANT CHANGE UP (ref 10.3–14.5)
SODIUM SERPL-SCNC: 144 MMOL/L — SIGNIFICANT CHANGE UP (ref 135–145)
WBC # BLD: 5.93 K/UL — SIGNIFICANT CHANGE UP (ref 3.8–10.5)
WBC # FLD AUTO: 5.93 K/UL — SIGNIFICANT CHANGE UP (ref 3.8–10.5)

## 2022-11-08 PROCEDURE — 97161 PT EVAL LOW COMPLEX 20 MIN: CPT

## 2022-11-08 PROCEDURE — 80053 COMPREHEN METABOLIC PANEL: CPT

## 2022-11-08 PROCEDURE — 71045 X-RAY EXAM CHEST 1 VIEW: CPT

## 2022-11-08 PROCEDURE — 96375 TX/PRO/DX INJ NEW DRUG ADDON: CPT

## 2022-11-08 PROCEDURE — 86900 BLOOD TYPING SEROLOGIC ABO: CPT

## 2022-11-08 PROCEDURE — 96374 THER/PROPH/DIAG INJ IV PUSH: CPT

## 2022-11-08 PROCEDURE — 86850 RBC ANTIBODY SCREEN: CPT

## 2022-11-08 PROCEDURE — 74176 CT ABD & PELVIS W/O CONTRAST: CPT | Mod: ME

## 2022-11-08 PROCEDURE — G1004: CPT

## 2022-11-08 PROCEDURE — 81001 URINALYSIS AUTO W/SCOPE: CPT

## 2022-11-08 PROCEDURE — 82272 OCCULT BLD FECES 1-3 TESTS: CPT

## 2022-11-08 PROCEDURE — 87040 BLOOD CULTURE FOR BACTERIA: CPT

## 2022-11-08 PROCEDURE — 71250 CT THORAX DX C-: CPT | Mod: MG

## 2022-11-08 PROCEDURE — 83036 HEMOGLOBIN GLYCOSYLATED A1C: CPT

## 2022-11-08 PROCEDURE — 99285 EMERGENCY DEPT VISIT HI MDM: CPT | Mod: 25

## 2022-11-08 PROCEDURE — 85610 PROTHROMBIN TIME: CPT

## 2022-11-08 PROCEDURE — 36415 COLL VENOUS BLD VENIPUNCTURE: CPT

## 2022-11-08 PROCEDURE — 93005 ELECTROCARDIOGRAM TRACING: CPT

## 2022-11-08 PROCEDURE — 86901 BLOOD TYPING SEROLOGIC RH(D): CPT

## 2022-11-08 PROCEDURE — 82962 GLUCOSE BLOOD TEST: CPT

## 2022-11-08 PROCEDURE — 83605 ASSAY OF LACTIC ACID: CPT

## 2022-11-08 PROCEDURE — 85730 THROMBOPLASTIN TIME PARTIAL: CPT

## 2022-11-08 PROCEDURE — 0225U NFCT DS DNA&RNA 21 SARSCOV2: CPT

## 2022-11-08 PROCEDURE — 87086 URINE CULTURE/COLONY COUNT: CPT

## 2022-11-08 PROCEDURE — 85027 COMPLETE CBC AUTOMATED: CPT

## 2022-11-08 RX ADMIN — Medication 100 MILLIGRAM(S): at 05:55

## 2022-11-08 RX ADMIN — Medication 650 MILLIGRAM(S): at 08:34

## 2022-11-08 RX ADMIN — PANTOPRAZOLE SODIUM 40 MILLIGRAM(S): 20 TABLET, DELAYED RELEASE ORAL at 05:54

## 2022-11-08 RX ADMIN — Medication 650 MILLIGRAM(S): at 08:20

## 2022-11-08 RX ADMIN — ATENOLOL 25 MILLIGRAM(S): 25 TABLET ORAL at 05:54

## 2022-11-08 NOTE — DISCHARGE NOTE PROVIDER - NSDCMRMEDTOKEN_GEN_ALL_CORE_FT
aspirin 81 mg oral tablet: 1 tab(s) orally once a day  atenolol 25 mg oral tablet: 1 tab(s) orally once a day  isosorbide mononitrate 30 mg oral tablet, extended release: 1 tab(s) orally once a day (in the morning)  Lasix 20 mg oral tablet: 1 tab(s) orally once a day  losartan 25 mg oral tablet: 1 tab(s) orally once a day  losartan 25 mg oral tablet: 0.5 milligram(s) orally once a day (in the afternoon)  Melatonin 3 mg oral tablet: 1 tab(s) orally once a day (at bedtime)  metFORMIN 500 mg oral tablet: 1 tab(s) orally 2 times a day  Restart on 8/28/22  multivitamin: orally once a day  Senna 8.6 mg oral tablet: 2 tab(s) orally once a day (at bedtime)  simvastatin 40 mg oral tablet: 1 tab(s) orally once a day (at bedtime)   aspirin 81 mg oral tablet: 1 tab(s) orally once a day  atenolol 25 mg oral tablet: 1 tab(s) orally once a day  isosorbide mononitrate 30 mg oral tablet, extended release: 1 tab(s) orally once a day (in the morning)  Lasix 20 mg oral tablet: 1 tab(s) orally once a day  losartan 25 mg oral tablet: 0.5 milligram(s) orally once a day (in the afternoon)  Melatonin 3 mg oral tablet: 1 tab(s) orally once a day (at bedtime)  metFORMIN 500 mg oral tablet: 1 tab(s) orally 2 times a day  Restart on 8/28/22  multivitamin: orally once a day  Senna 8.6 mg oral tablet: 2 tab(s) orally once a day (at bedtime)  simvastatin 40 mg oral tablet: 1 tab(s) orally once a day (at bedtime)

## 2022-11-08 NOTE — PROGRESS NOTE ADULT - ASSESSMENT
rectal bleed  acute blood loss anemia    hgb now stable  etiology unclear  ? diverticular vs hemorrhoidal; now resolving  agree with holding asa  preparation H; anusol suppository  reg diet  poor candidate for colonoscopy at present    I reviewed the overnight course of events on the unit, re-confirming the patient history. I discussed the care with the patient and their family  Differential diagnosis and plan of care discussed with patient after the evaluation  35 minutes spent on total encounter of which more than fifty percent of the encounter was spent counseling and/or coordinating care by the attending physician.  Advanced care planning was discussed with patient and family.  Advanced care planning forms were reviewed and discussed.  Risks, benefits and alternatives of gastroenterologic procedures were discussed in detail and all questions were answered.

## 2022-11-08 NOTE — PROGRESS NOTE ADULT - ASSESSMENT
89F with h/o HTN, HL, DM, CAD s/p PCI who is admitted with GI bleed and COVID.    Plan:  - Recent cath 8/22 with patent stents and otherwise distal small vessel disease  - Continue atenolol 25 mg daily (mildly bradycardic at times)  - Hold losartan due to HILLARY  - Continue imdur 30 mg daily  - Hold aspirin  - Monitor hemoglobin  - COVID positive  - ID and GI follow-up 89F with h/o HTN, HL, DM, CAD s/p PCI who is admitted with GI bleed and COVID.    Plan:  - Recent cath 8/22 with patent stents and otherwise distal small vessel disease  - Continue atenolol 25 mg daily (mildly bradycardic at times)  - Hold losartan due to HILLARY  - Continue imdur 30 mg daily  - Hold aspirin  - Cont Zocor 40mg daily  - Monitor hemoglobin  - COVID positive  - ID and GI follow-up

## 2022-11-08 NOTE — PROGRESS NOTE ADULT - PROVIDER SPECIALTY LIST ADULT
Pulmonology
Cardiology
Hospitalist
Nephrology
Pulmonology
Cardiology
Gastroenterology
Nephrology
Pulmonology
Cardiology
Hospitalist
Hospitalist
Surgery
Surgery

## 2022-11-08 NOTE — DISCHARGE NOTE PROVIDER - HOSPITAL COURSE
89 F hx dm , htn, hld, gastritis, cad, stents x 2, OA BIB daughter due to bloody bowel movement around 2 PM. Pt woke up with decreased PO intake, not feeling well, +chills. Complained of abdominal discomfort. Vomited. Took a nap. Woke up asking to go to bathroom. While walking with daughter she had bloody BM in diaper. On ASA.    1 Acute blood loss anemia  - monitor cbc  - sec to diverticulosis   - GI fu   - rsolved     2 COVID 19  - pulmonary fu   - symptomatic care     3 HILLARY  - monitor cr    4 DM- monitor FS  - ISS

## 2022-11-08 NOTE — DISCHARGE NOTE NURSING/CASE MANAGEMENT/SOCIAL WORK - NSDCVIVACCINE_GEN_ALL_CORE_FT
Tdap; 01-Oct-2015 22:48; Rico Almanzar); [employer]; [id number]; IntraMuscular; Deltoid Left.; 0.5 milliLiter(s); VIS (VIS Published: 09-May-2013, VIS Presented: 01-Oct-2015);

## 2022-11-08 NOTE — PROGRESS NOTE ADULT - SUBJECTIVE AND OBJECTIVE BOX
Date/Time Patient Seen:  		  Referring MD:   Data Reviewed	       Patient is a 89y old  Female who presents with a chief complaint of bloody bowel movement (07 Nov 2022 19:24)      Subjective/HPI     PAST MEDICAL & SURGICAL HISTORY:  Diabetes mellitus    CAD (coronary artery disease)  2 cardiac stents placed    Hypertension    Hyperlipidemia    No pertinent past medical history    No pertinent past medical history    HTN (hypertension)    DM (diabetes mellitus)  diet-controlled    CAD (coronary artery disease)    History of cholelithiasis    Hypercholesteremia    No significant past surgical history    Bilateral cataracts    No significant past surgical history    S/P coronary artery stent placement          Medication list         MEDICATIONS  (STANDING):  ATENolol  Tablet 25 milliGRAM(s) Oral daily  benzonatate 100 milliGRAM(s) Oral three times a day  dextrose 5%. 1000 milliLiter(s) (100 mL/Hr) IV Continuous <Continuous>  dextrose 5%. 1000 milliLiter(s) (50 mL/Hr) IV Continuous <Continuous>  dextrose 50% Injectable 25 Gram(s) IV Push once  dextrose 50% Injectable 12.5 Gram(s) IV Push once  dextrose 50% Injectable 25 Gram(s) IV Push once  glucagon  Injectable 1 milliGRAM(s) IntraMuscular once  hemorrhoidal Ointment 1 Application(s) Rectal at bedtime  influenza  Vaccine (HIGH DOSE) 0.7 milliLiter(s) IntraMuscular once  insulin lispro (ADMELOG) corrective regimen sliding scale   SubCutaneous three times a day before meals  isosorbide   mononitrate ER Tablet (IMDUR) 30 milliGRAM(s) Oral daily  melatonin 3 milliGRAM(s) Oral at bedtime  pantoprazole  Injectable 40 milliGRAM(s) IV Push two times a day  simvastatin 40 milliGRAM(s) Oral at bedtime  sodium chloride 0.9%. 1000 milliLiter(s) (75 mL/Hr) IV Continuous <Continuous>    MEDICATIONS  (PRN):  acetaminophen     Tablet .. 650 milliGRAM(s) Oral every 6 hours PRN Temp greater or equal to 38C (100.4F), Mild Pain (1 - 3)  ALBUTerol    90 MICROgram(s) HFA Inhaler 2 Puff(s) Inhalation every 6 hours PRN Wheezing  dextrose Oral Gel 15 Gram(s) Oral once PRN Blood Glucose LESS THAN 70 milliGRAM(s)/deciliter  guaiFENesin Oral Liquid (Sugar-Free) 100 milliGRAM(s) Oral every 6 hours PRN Cough  hydrocodone/homatropine Syrup 5 milliLiter(s) Oral every 6 hours PRN Cough         Vitals log        ICU Vital Signs Last 24 Hrs  T(C): 37.2 (08 Nov 2022 04:11), Max: 37.2 (08 Nov 2022 04:11)  T(F): 98.9 (08 Nov 2022 04:11), Max: 98.9 (08 Nov 2022 04:11)  HR: 63 (08 Nov 2022 04:11) (55 - 63)  BP: 133/63 (08 Nov 2022 04:11) (128/- - 148/67)  BP(mean): --  ABP: --  ABP(mean): --  RR: 18 (08 Nov 2022 04:11) (18 - 18)  SpO2: 90% (08 Nov 2022 04:11) (90% - 95%)    O2 Parameters below as of 08 Nov 2022 04:11  Patient On (Oxygen Delivery Method): room air                 Input and Output:  I&O's Detail    06 Nov 2022 07:01  -  07 Nov 2022 07:00  --------------------------------------------------------  IN:    Oral Fluid: 360 mL    sodium chloride 0.9%: 750 mL  Total IN: 1110 mL    OUT:  Total OUT: 0 mL    Total NET: 1110 mL      07 Nov 2022 07:01  -  08 Nov 2022 05:48  --------------------------------------------------------  IN:  Total IN: 0 mL    OUT:    Voided (mL): 1000 mL  Total OUT: 1000 mL    Total NET: -1000 mL          Lab Data                        8.2    5.08  )-----------( 179      ( 07 Nov 2022 06:18 )             26.1     11-07    144  |  112<H>  |  15  ----------------------------<  110<H>  3.7   |  28  |  0.69    Ca    7.9<L>      07 Nov 2022 06:18    TPro  5.7<L>  /  Alb  2.6<L>  /  TBili  0.3  /  DBili  x   /  AST  78<H>  /  ALT  30  /  AlkPhos  54  11-07            Review of Systems	      Objective     Physical Examination  heart s1s2  lung dec BS  head nc      Pertinent Lab findings & Imaging      Alyssa:  NO   Adequate UO     I&O's Detail    06 Nov 2022 07:01  -  07 Nov 2022 07:00  --------------------------------------------------------  IN:    Oral Fluid: 360 mL    sodium chloride 0.9%: 750 mL  Total IN: 1110 mL    OUT:  Total OUT: 0 mL    Total NET: 1110 mL      07 Nov 2022 07:01  -  08 Nov 2022 05:48  --------------------------------------------------------  IN:  Total IN: 0 mL    OUT:    Voided (mL): 1000 mL  Total OUT: 1000 mL    Total NET: -1000 mL               Discussed with:     Cultures:	        Radiology

## 2022-11-08 NOTE — PROGRESS NOTE ADULT - ASSESSMENT
89 F hx dm , htn, hld, gastritis, cad, stents x 2, OA BIB daughter due to bloody bowel movement    diverticular disease  eval GI Bleed  HTN  HLD  CAD  Dementia  COVID  Lung Granuloma  OP  OA  DM    GI - Surgery eval noted -   poor candidate for colonoscopy as per GI note  on IVF  PO diet  Cardio follow up noted    GI eval  serial Hgb  monitor VS and HD  DM care and serial FS  assist with needs - monitor mental status - GOC discussion - oral hygiene - skin care  isolation for COVID  monitor O2 sat - no indication for Decadron at present  dvt p - seq teds  cvs rx regimen and BP control  ACAP - Robitussin PRN for sx management

## 2022-11-08 NOTE — PHYSICAL THERAPY INITIAL EVALUATION ADULT - ADDITIONAL COMMENTS
Information obtained from EMR- pt unable to provide history secondary to language barrier. Pt lives w/ her dtr and dtr's family in a house, + steps to bedroom. Dtr is CDPAP aide. Pt ambulates w/ RW and assist and requires assistance for ADLs and assistance on the steps

## 2022-11-08 NOTE — PROGRESS NOTE ADULT - ASSESSMENT
89 F hx dm , htn, hld, gastritis, cad, stents x 2, OA BIB daughter due to bloody bowel movement around 2 PM. Pt woke up with decreased PO intake, not feeling well, +chills. Complained of abdominal discomfort. Vomited. Took a nap. Woke up asking to go to bathroom. While walking with daughter she had bloody BM in diaper. On ASA. (04 Nov 2022 18:47)      ACUTE RENAL FAILURE:   Serum creatinine is  improved      , approximating GFR at   ml/min.   There is no progression . No uremic symptoms  pos  evidence of anemia .  Fluid status stable.  Will continue to avoid nephrotoxic drugs.  Patient remains asymptomatic.   Continue current therapy.      BP monitoring,continue current antihypertensive meds, low salt diet,followup with PMD in 1-2 weeks   ATENolol  Tablet 25 milliGRAM(s) Oral daily

## 2022-11-08 NOTE — PROGRESS NOTE ADULT - REASON FOR ADMISSION
bloody bowel movement

## 2022-11-08 NOTE — PROGRESS NOTE ADULT - NUTRITIONAL ASSESSMENT
MEDICATIONS  (STANDING):  ATENolol  Tablet 25 milliGRAM(s) Oral daily  benzonatate 100 milliGRAM(s) Oral three times a day  dextrose 5%. 1000 milliLiter(s) (100 mL/Hr) IV Continuous <Continuous>  dextrose 5%. 1000 milliLiter(s) (50 mL/Hr) IV Continuous <Continuous>  dextrose 50% Injectable 25 Gram(s) IV Push once  dextrose 50% Injectable 12.5 Gram(s) IV Push once  dextrose 50% Injectable 25 Gram(s) IV Push once  glucagon  Injectable 1 milliGRAM(s) IntraMuscular once  influenza  Vaccine (HIGH DOSE) 0.7 milliLiter(s) IntraMuscular once  insulin lispro (ADMELOG) corrective regimen sliding scale   SubCutaneous three times a day before meals  isosorbide   mononitrate ER Tablet (IMDUR) 30 milliGRAM(s) Oral daily  melatonin 3 milliGRAM(s) Oral at bedtime  pantoprazole  Injectable 40 milliGRAM(s) IV Push two times a day  simvastatin 40 milliGRAM(s) Oral at bedtime  sodium chloride 0.9%. 1000 milliLiter(s) (75 mL/Hr) IV Continuous <Continuous>

## 2022-11-08 NOTE — PROGRESS NOTE ADULT - SUBJECTIVE AND OBJECTIVE BOX
[facility]  Tavon Dorantes PA-C  [address] [place] [address]  [phone]      INTERVAL HPI/OVERNIGHT EVENTS:  Pt s/e  No reported overt GI bleeding    MEDICATIONS  (STANDING):  ATENolol  Tablet 25 milliGRAM(s) Oral daily  benzonatate 100 milliGRAM(s) Oral three times a day  dextrose 5%. 1000 milliLiter(s) (100 mL/Hr) IV Continuous <Continuous>  dextrose 5%. 1000 milliLiter(s) (50 mL/Hr) IV Continuous <Continuous>  dextrose 50% Injectable 25 Gram(s) IV Push once  dextrose 50% Injectable 12.5 Gram(s) IV Push once  dextrose 50% Injectable 25 Gram(s) IV Push once  glucagon  Injectable 1 milliGRAM(s) IntraMuscular once  hemorrhoidal Ointment 1 Application(s) Rectal at bedtime  influenza  Vaccine (HIGH DOSE) 0.7 milliLiter(s) IntraMuscular once  insulin lispro (ADMELOG) corrective regimen sliding scale   SubCutaneous three times a day before meals  isosorbide   mononitrate ER Tablet (IMDUR) 30 milliGRAM(s) Oral daily  melatonin 3 milliGRAM(s) Oral at bedtime  pantoprazole  Injectable 40 milliGRAM(s) IV Push two times a day  simvastatin 40 milliGRAM(s) Oral at bedtime  sodium chloride 0.9%. 1000 milliLiter(s) (75 mL/Hr) IV Continuous <Continuous>    MEDICATIONS  (PRN):  acetaminophen     Tablet .. 650 milliGRAM(s) Oral every 6 hours PRN Temp greater or equal to 38C (100.4F), Mild Pain (1 - 3)  ALBUTerol    90 MICROgram(s) HFA Inhaler 2 Puff(s) Inhalation every 6 hours PRN Wheezing  dextrose Oral Gel 15 Gram(s) Oral once PRN Blood Glucose LESS THAN 70 milliGRAM(s)/deciliter  guaiFENesin Oral Liquid (Sugar-Free) 100 milliGRAM(s) Oral every 6 hours PRN Cough  hydrocodone/homatropine Syrup 5 milliLiter(s) Oral every 6 hours PRN Cough      Allergies    No Known Allergies      PHYSICAL EXAM:   Vital Signs:  Vital Signs Last 24 Hrs  T(C): 37.2 (08 Nov 2022 04:11), Max: 37.2 (08 Nov 2022 04:11)  T(F): 98.9 (08 Nov 2022 04:11), Max: 98.9 (08 Nov 2022 04:11)  HR: 63 (08 Nov 2022 04:11) (55 - 63)  BP: 133/63 (08 Nov 2022 04:11) (128/- - 133/63)  BP(mean): --  RR: 18 (08 Nov 2022 04:11) (18 - 18)  SpO2: 90% (08 Nov 2022 04:11) (90% - 95%)    Parameters below as of 08 Nov 2022 04:11  Patient On (Oxygen Delivery Method): room air      GENERAL:  Appears stated age  ABDOMEN:  Soft, non-tender, non-distended  NEURO:  Alert    LABS:                        8.2    5.93  )-----------( 201      ( 08 Nov 2022 06:43 )             26.0     11-08    144  |  110<H>  |  13  ----------------------------<  114<H>  4.4   |  31  |  0.75    Ca    8.4<L>      08 Nov 2022 06:43    TPro  6.3  /  Alb  2.8<L>  /  TBili  0.6  /  DBili  x   /  AST  58<H>  /  ALT  29  /  AlkPhos  57  11-08

## 2022-11-08 NOTE — DISCHARGE NOTE PROVIDER - NSDCCPCAREPLAN_GEN_ALL_CORE_FT
PRINCIPAL DISCHARGE DIAGNOSIS  Diagnosis: COVID  Assessment and Plan of Treatment: gi fu      SECONDARY DISCHARGE DIAGNOSES  Diagnosis: GI bleed  Assessment and Plan of Treatment: sec to diverticulosis , gi fu

## 2022-11-08 NOTE — DISCHARGE NOTE NURSING/CASE MANAGEMENT/SOCIAL WORK - PATIENT PORTAL LINK FT
You can access the FollowMyHealth Patient Portal offered by [facility] by registering at the following website: [email or web address]. By joining [facility]’s FollowMyHealth portal, you will also be able to view your health information using other applications (apps) compatible with our system.

## 2022-11-08 NOTE — DISCHARGE NOTE PROVIDER - CARE PROVIDER_API CALL
Aaron Hernandez (DO)  Gastroenterology; Internal Medicine  [address] [place] [address]  Phone: [phone]  Fax: [phone]  Follow Up Time:

## 2022-11-08 NOTE — PROGRESS NOTE ADULT - SUBJECTIVE AND OBJECTIVE BOX
[facility] Cardiology Progress Note [phone] (Dr. Maciel, Sathish, Morelia, Jeff)    CHIEF COMPLAINT: Patient is a 89y old  Female who presents with a chief complaint of bloody bowel movement (08 Nov 2022 05:47)      Follow Up Today: The patient denies any chest discomfort or shortness of breath.    HPI:  89 F hx dm , htn, hld, gastritis, cad, stents x 2, OA BIB daughter due to bloody bowel movement around 2 PM. Pt woke up with decreased PO intake, not feeling well, +chills. Complained of abdominal discomfort. Vomited. Took a nap. Woke up asking to go to bathroom. While walking with daughter she had bloody BM in diaper. On ASA. (04 Nov 2022 18:47)      PAST MEDICAL & SURGICAL HISTORY:  Diabetes mellitus      CAD (coronary artery disease)  2 cardiac stents placed      Hypertension      Hyperlipidemia      HTN (hypertension)      DM (diabetes mellitus)  diet-controlled      CAD (coronary artery disease)      History of cholelithiasis      Hypercholesteremia      Bilateral cataracts      S/P coronary artery stent placement          MEDICATIONS  (STANDING):  ATENolol  Tablet 25 milliGRAM(s) Oral daily  benzonatate 100 milliGRAM(s) Oral three times a day  dextrose 5%. 1000 milliLiter(s) (100 mL/Hr) IV Continuous <Continuous>  dextrose 5%. 1000 milliLiter(s) (50 mL/Hr) IV Continuous <Continuous>  dextrose 50% Injectable 25 Gram(s) IV Push once  dextrose 50% Injectable 12.5 Gram(s) IV Push once  dextrose 50% Injectable 25 Gram(s) IV Push once  glucagon  Injectable 1 milliGRAM(s) IntraMuscular once  hemorrhoidal Ointment 1 Application(s) Rectal at bedtime  influenza  Vaccine (HIGH DOSE) 0.7 milliLiter(s) IntraMuscular once  insulin lispro (ADMELOG) corrective regimen sliding scale   SubCutaneous three times a day before meals  isosorbide   mononitrate ER Tablet (IMDUR) 30 milliGRAM(s) Oral daily  melatonin 3 milliGRAM(s) Oral at bedtime  pantoprazole  Injectable 40 milliGRAM(s) IV Push two times a day  simvastatin 40 milliGRAM(s) Oral at bedtime  sodium chloride 0.9%. 1000 milliLiter(s) (75 mL/Hr) IV Continuous <Continuous>    MEDICATIONS  (PRN):  acetaminophen     Tablet .. 650 milliGRAM(s) Oral every 6 hours PRN Temp greater or equal to 38C (100.4F), Mild Pain (1 - 3)  ALBUTerol    90 MICROgram(s) HFA Inhaler 2 Puff(s) Inhalation every 6 hours PRN Wheezing  dextrose Oral Gel 15 Gram(s) Oral once PRN Blood Glucose LESS THAN 70 milliGRAM(s)/deciliter  guaiFENesin Oral Liquid (Sugar-Free) 100 milliGRAM(s) Oral every 6 hours PRN Cough  hydrocodone/homatropine Syrup 5 milliLiter(s) Oral every 6 hours PRN Cough      Allergies    No Known Allergies    Intolerances        REVIEW OF SYSTEMS:    All other review of systems is negative unless indicated above    Vital Signs Last 24 Hrs  T(C): 37.2 (08 Nov 2022 04:11), Max: 37.2 (08 Nov 2022 04:11)  T(F): 98.9 (08 Nov 2022 04:11), Max: 98.9 (08 Nov 2022 04:11)  HR: 63 (08 Nov 2022 04:11) (55 - 63)  BP: 133/63 (08 Nov 2022 04:11) (128/- - 148/67)  BP(mean): --  RR: 18 (08 Nov 2022 04:11) (18 - 18)  SpO2: 90% (08 Nov 2022 04:11) (90% - 95%)    Parameters below as of 08 Nov 2022 04:11  Patient On (Oxygen Delivery Method): room air        I&O's Summary    07 Nov 2022 07:01  -  08 Nov 2022 07:00  --------------------------------------------------------  IN: 0 mL / OUT: 1000 mL / NET: -1000 mL        PHYSICAL EXAM:    Constitutional: NAD, awake and alert, well-developed  Eyes:  EOMI,  Pupils round, No oral cyanosis.  HEENT: No exudate or erythema  Pulmonary: Non-labored, breath sounds are clear bilaterally, No wheezing, rales or rhonchi  Cardiovascular: Regular, S1 and S2, No murmurs, rubs, gallops oir clicks  Gastrointestinal: Bowel Sounds present, soft, nontender.   Ext: No significant LE edema  Neurological: Alert, no gross focal motor deficits  Skin: No rashes.  Psych:  Mood & affect appropriate    LABS: All Labs Reviewed:                        8.2    5.93  )-----------( 201      ( 08 Nov 2022 06:43 )             26.0                         8.2    5.08  )-----------( 179      ( 07 Nov 2022 06:18 )             26.1                         8.3    5.42  )-----------( 173      ( 06 Nov 2022 11:38 )             26.4     07 Nov 2022 06:18    144    |  112    |  15     ----------------------------<  110    3.7     |  28     |  0.69   06 Nov 2022 11:38    143    |  112    |  16     ----------------------------<  151    4.0     |  27     |  0.82     Ca    7.9        07 Nov 2022 06:18  Ca    8.0        06 Nov 2022 11:38    TPro  5.7    /  Alb  2.6    /  TBili  0.3    /  DBili  x      /  AST  78     /  ALT  30     /  AlkPhos  54     07 Nov 2022 06:18  TPro  5.9    /  Alb  2.7    /  TBili  0.3    /  DBili  x      /  AST  89     /  ALT  29     /  AlkPhos  50     06 Nov 2022 11:38          Blood Culture: Organism --  Gram Stain Blood -- Gram Stain --  Specimen Source Clean Catch Clean Catch (Midstream)  Culture-Blood --    Organism --  Gram Stain Blood -- Gram Stain --  Specimen Source .Blood Blood-Peripheral  Culture-Blood --    Organism --  Gram Stain Blood -- Gram Stain --  Specimen Source .Blood Blood-Peripheral  Culture-Blood --            RADIOLOGY/EKG:    Attending Attestation:   20 minutes spent on total encounter; more than 50% of the visit was spent counseling and/or coordinating care by the attending physician.     ASSESSMENT AND PLAN [facility] Cardiology Progress Note [phone] (Dr. Maciel, Sathish, Morelia, Jeff)    CHIEF COMPLAINT: Patient is a 89y old  Female who presents with a chief complaint of bloody bowel movement (08 Nov 2022 05:47)      Follow Up Today: The patient denies any chest discomfort or shortness of breath. Complains of headache    HPI:  89 F hx dm , htn, hld, gastritis, cad, stents x 2, OA BIB daughter due to bloody bowel movement around 2 PM. Pt woke up with decreased PO intake, not feeling well, +chills. Complained of abdominal discomfort. Vomited. Took a nap. Woke up asking to go to bathroom. While walking with daughter she had bloody BM in diaper. On ASA. (04 Nov 2022 18:47)      PAST MEDICAL & SURGICAL HISTORY:  Diabetes mellitus      CAD (coronary artery disease)  2 cardiac stents placed      Hypertension      Hyperlipidemia      HTN (hypertension)      DM (diabetes mellitus)  diet-controlled      CAD (coronary artery disease)      History of cholelithiasis      Hypercholesteremia      Bilateral cataracts      S/P coronary artery stent placement          MEDICATIONS  (STANDING):  ATENolol  Tablet 25 milliGRAM(s) Oral daily  benzonatate 100 milliGRAM(s) Oral three times a day  dextrose 5%. 1000 milliLiter(s) (100 mL/Hr) IV Continuous <Continuous>  dextrose 5%. 1000 milliLiter(s) (50 mL/Hr) IV Continuous <Continuous>  dextrose 50% Injectable 25 Gram(s) IV Push once  dextrose 50% Injectable 12.5 Gram(s) IV Push once  dextrose 50% Injectable 25 Gram(s) IV Push once  glucagon  Injectable 1 milliGRAM(s) IntraMuscular once  hemorrhoidal Ointment 1 Application(s) Rectal at bedtime  influenza  Vaccine (HIGH DOSE) 0.7 milliLiter(s) IntraMuscular once  insulin lispro (ADMELOG) corrective regimen sliding scale   SubCutaneous three times a day before meals  isosorbide   mononitrate ER Tablet (IMDUR) 30 milliGRAM(s) Oral daily  melatonin 3 milliGRAM(s) Oral at bedtime  pantoprazole  Injectable 40 milliGRAM(s) IV Push two times a day  simvastatin 40 milliGRAM(s) Oral at bedtime  sodium chloride 0.9%. 1000 milliLiter(s) (75 mL/Hr) IV Continuous <Continuous>    MEDICATIONS  (PRN):  acetaminophen     Tablet .. 650 milliGRAM(s) Oral every 6 hours PRN Temp greater or equal to 38C (100.4F), Mild Pain (1 - 3)  ALBUTerol    90 MICROgram(s) HFA Inhaler 2 Puff(s) Inhalation every 6 hours PRN Wheezing  dextrose Oral Gel 15 Gram(s) Oral once PRN Blood Glucose LESS THAN 70 milliGRAM(s)/deciliter  guaiFENesin Oral Liquid (Sugar-Free) 100 milliGRAM(s) Oral every 6 hours PRN Cough  hydrocodone/homatropine Syrup 5 milliLiter(s) Oral every 6 hours PRN Cough      Allergies    No Known Allergies    Intolerances        REVIEW OF SYSTEMS:    All other review of systems is negative unless indicated above    Vital Signs Last 24 Hrs  T(C): 37.2 (08 Nov 2022 04:11), Max: 37.2 (08 Nov 2022 04:11)  T(F): 98.9 (08 Nov 2022 04:11), Max: 98.9 (08 Nov 2022 04:11)  HR: 63 (08 Nov 2022 04:11) (55 - 63)  BP: 133/63 (08 Nov 2022 04:11) (128/- - 148/67)  BP(mean): --  RR: 18 (08 Nov 2022 04:11) (18 - 18)  SpO2: 90% (08 Nov 2022 04:11) (90% - 95%)    Parameters below as of 08 Nov 2022 04:11  Patient On (Oxygen Delivery Method): room air        I&O's Summary    07 Nov 2022 07:01  -  08 Nov 2022 07:00  --------------------------------------------------------  IN: 0 mL / OUT: 1000 mL / NET: -1000 mL        PHYSICAL EXAM:    Constitutional: NAD, awake and alert, well-developed  Eyes:  EOMI,  Pupils round, No oral cyanosis.  HEENT: No exudate or erythema  Pulmonary: Non-labored, breath sounds are clear bilaterally, No wheezing, rales or rhonchi  Cardiovascular: Regular, S1 and S2, No murmurs  Gastrointestinal: Bowel Sounds present, soft, nontender.   Ext: No significant LE edema  Neurological: Alert, no gross focal motor deficits  Skin: No rashes.  Psych:  Mood & affect appropriate    LABS: All Labs Reviewed:                        8.2    5.93  )-----------( 201      ( 08 Nov 2022 06:43 )             26.0                         8.2    5.08  )-----------( 179      ( 07 Nov 2022 06:18 )             26.1                         8.3    5.42  )-----------( 173      ( 06 Nov 2022 11:38 )             26.4     07 Nov 2022 06:18    144    |  112    |  15     ----------------------------<  110    3.7     |  28     |  0.69   06 Nov 2022 11:38    143    |  112    |  16     ----------------------------<  151    4.0     |  27     |  0.82     Ca    7.9        07 Nov 2022 06:18  Ca    8.0        06 Nov 2022 11:38    TPro  5.7    /  Alb  2.6    /  TBili  0.3    /  DBili  x      /  AST  78     /  ALT  30     /  AlkPhos  54     07 Nov 2022 06:18  TPro  5.9    /  Alb  2.7    /  TBili  0.3    /  DBili  x      /  AST  89     /  ALT  29     /  AlkPhos  50     06 Nov 2022 11:38          Blood Culture: Organism --  Gram Stain Blood -- Gram Stain --  Specimen Source Clean Catch Clean Catch (Midstream)  Culture-Blood --    Organism --  Gram Stain Blood -- Gram Stain --  Specimen Source .Blood Blood-Peripheral  Culture-Blood --    Organism --  Gram Stain Blood -- Gram Stain --  Specimen Source .Blood Blood-Peripheral  Culture-Blood --            RADIOLOGY/EKG:    Attending Attestation:   20 minutes spent on total encounter; more than 50% of the visit was spent counseling and/or coordinating care by the attending physician.     ASSESSMENT AND PLAN

## 2023-05-09 NOTE — ED ADULT NURSE NOTE - ED CARDIAC RHYTHM
-- DO NOT REPLY / DO NOT REPLY ALL --  -- Message is from [employer] [facility] [employer] ([employer]) --    General Patient Message: Patient has FMLA paperwork to be filled out and is asking how can she submit her documents to be filled out. Patient works [employer] and wont be able to answer but a detailed message with instructions can be left. Please call back to assist.     Caller Information       Type Contact Phone/Fax    05/09/2023 12:05 PM CDT Phone (Incoming) Wilma Vick (Self) [phone] (M)        Alternative phone number: N/A    Can a detailed message be left? Yes    Message Turnaround: [place]:    Refer to site's KB page for routing instructions    Please give this turnaround time to the caller:   \"You can expect to receive a response 1-3 business days after your provider's clinical team reviews the message\"              
Call to patient. States that has elevated creatinine kinase and issues with bilateral knee pain, saw Dr. Meadows today with ortho who did write letter for restrictions as patient states that her work is wanting patient to work over 40 hours per week and due to knees is not able to.    Informed patient needs to contact Dr. Meadows's office in regards to FMLA as they would need to complete this paperwork, patient agreed.  
regular

## 2023-05-10 NOTE — DISCHARGE NOTE PROVIDER - CARE PROVIDER_API CALL
Henry Leach)  Orthopaedic Surgery  [address] [place] [address]  Phone: [phone]  Fax: [phone]  Follow Up Time:    IVL

## 2023-06-20 NOTE — DISCHARGE NOTE PROVIDER - POSTFACE STATEMENT FOR MINUTES SPENT
Spine appears normal, range of motion is not limited, no muscle or joint tenderness
minutes on the discharge service.

## 2024-07-17 NOTE — ED ADULT TRIAGE NOTE - PRO INTERPRETER NEED 2
[place] [id number] [facility] [address]  [facility] [address]  [phone] [facility], [address] [place] [address], [address], [address], [phone]    [address] [id number] [phone] [address], [id number] [phone]            1171     English

## 2024-08-23 NOTE — PATIENT PROFILE ADULT - NSPROPTRIGHTBILLOFRIGHTS_GEN_A_NUR
[facility] patient notified therapist her BP was 157/80 Hr 79 BACK ON THE 13TH WAS HAVING LOWER PRESSURES & HER aMLODIPINE WAS LOWERED.  Please call with any further changes, please call to verify    patient

## 2024-09-12 NOTE — PHYSICAL THERAPY INITIAL EVALUATION ADULT - GENERAL OBSERVATIONS, REHAB EVAL
Goal Outcome Evaluation:      Pt is A&O x 4. Able to make needs known, call light within reach. Had a laparoscopic appendectomy today: pain controlled with IV Dilaudid. Requested to speak with case management r/t insurance issues with asthma medications. Plan of care ongoing.                                         Pt present supine in bed with sling on left UE

## 2024-12-15 NOTE — ED PROVIDER NOTE - CLINICAL SUMMARY MEDICAL DECISION MAKING FREE TEXT BOX
Bed: H04  Expected date:   Expected time:   Means of arrival:   Comments:  TRIAGE   89-year-old female sent in by PCP for progressively worsening shortness of breath, rales on exam as per PCP, 3+ lower extremity edema with concern for CHF exacerbation.  On exam patient well-appearing in no distress with bibasilar rhonchi and trace pitting edema.  Will check cardiac labs to rule out CHF exacerbation, fluid overload, pneumonia, infectious etiology.

## 2025-02-28 NOTE — PROVIDER CONTACT NOTE (CRITICAL VALUE NOTIFICATION) - DATE AND TIME:
Please inform patient diarrhea is likely due to something that she ate, recommend continue a bland diet and if diarrhea persists through the weekend, we can send in stool studies to rule out treatable infection.  Stay hydrated and drink Gatorade to replenish electrolytes if multiple episodes of diarrhea   25-May-2021 10:20

## 2025-03-20 NOTE — ED ADULT TRIAGE NOTE - TEMPERATURE IN FAHRENHEIT (DEGREES F)
[FreeTextEntry1] : I had a discussion regarding today's visit, the diagnosis and treatment recommendations and options.  We also discussed changes since the last visit.  At this time, I recommended   The patient has agreed to the above plan of management and has expressed full understanding.  All questions were fully answered to the patient's satisfaction.  My cumulative time spent on today's visit was greater than 30 minutes and included: Preparation for the visit, review of the medical records, review of pertinent diagnostic studies, examination and counseling of the patient on the above diagnosis, treatment plan and prognosis, orders of diagnostic tests, medications and/or appropriate procedures and documentation in the medical records of today's visit. 97

## 2025-05-20 NOTE — ED PROVIDER NOTE - WR INTERPRETATION 1
[FreeTextEntry1] : (ECG declines - patient prefers to do with Cardiologist).
CXR negative - No pneumothorax, No opacities, No free air